# Patient Record
Sex: FEMALE | Race: WHITE | Employment: UNEMPLOYED | URBAN - METROPOLITAN AREA
[De-identification: names, ages, dates, MRNs, and addresses within clinical notes are randomized per-mention and may not be internally consistent; named-entity substitution may affect disease eponyms.]

---

## 2017-05-14 ENCOUNTER — APPOINTMENT (OUTPATIENT)
Dept: GENERAL RADIOLOGY | Age: 57
End: 2017-05-14
Attending: EMERGENCY MEDICINE
Payer: MEDICAID

## 2017-05-14 ENCOUNTER — HOSPITAL ENCOUNTER (EMERGENCY)
Age: 57
Discharge: HOME | End: 2017-05-15
Attending: EMERGENCY MEDICINE
Payer: MEDICAID

## 2017-05-14 VITALS
OXYGEN SATURATION: 93 % | HEIGHT: 65 IN | WEIGHT: 220.02 LBS | TEMPERATURE: 99.2 F | SYSTOLIC BLOOD PRESSURE: 148 MMHG | HEART RATE: 91 BPM | BODY MASS INDEX: 36.66 KG/M2 | RESPIRATION RATE: 26 BRPM | DIASTOLIC BLOOD PRESSURE: 82 MMHG

## 2017-05-14 DIAGNOSIS — R09.89 PULMONARY VASCULAR CONGESTION: ICD-10-CM

## 2017-05-14 DIAGNOSIS — N39.0 URINARY TRACT INFECTION WITHOUT HEMATURIA, SITE UNSPECIFIED: Primary | ICD-10-CM

## 2017-05-14 LAB
ALBUMIN SERPL BCP-MCNC: 3.3 G/DL (ref 3.5–5)
ALBUMIN/GLOB SERPL: 0.8 {RATIO} (ref 1.1–2.2)
ALP SERPL-CCNC: 88 U/L (ref 45–117)
ALT SERPL-CCNC: 36 U/L (ref 12–78)
ANION GAP BLD CALC-SCNC: 9 MMOL/L (ref 5–15)
APPEARANCE UR: ABNORMAL
APTT PPP: 30.2 SEC (ref 22.1–32.5)
AST SERPL W P-5'-P-CCNC: 21 U/L (ref 15–37)
BACTERIA URNS QL MICRO: ABNORMAL /HPF
BASOPHILS # BLD AUTO: 0 K/UL (ref 0–0.1)
BASOPHILS # BLD: 0 % (ref 0–1)
BILIRUB SERPL-MCNC: 0.4 MG/DL (ref 0.2–1)
BILIRUB UR QL: NEGATIVE
BNP SERPL-MCNC: 89 PG/ML (ref 0–125)
BUN SERPL-MCNC: 12 MG/DL (ref 6–20)
BUN/CREAT SERPL: 14 (ref 12–20)
CALCIUM SERPL-MCNC: 8.8 MG/DL (ref 8.5–10.1)
CHLORIDE SERPL-SCNC: 105 MMOL/L (ref 97–108)
CK SERPL-CCNC: 58 U/L (ref 26–192)
CO2 SERPL-SCNC: 25 MMOL/L (ref 21–32)
COLOR UR: ABNORMAL
CREAT SERPL-MCNC: 0.83 MG/DL (ref 0.55–1.02)
EOSINOPHIL # BLD: 0.1 K/UL (ref 0–0.4)
EOSINOPHIL NFR BLD: 2 % (ref 0–7)
EPITH CASTS URNS QL MICRO: ABNORMAL /LPF
ERYTHROCYTE [DISTWIDTH] IN BLOOD BY AUTOMATED COUNT: 13.8 % (ref 11.5–14.5)
GLOBULIN SER CALC-MCNC: 4.3 G/DL (ref 2–4)
GLUCOSE SERPL-MCNC: 210 MG/DL (ref 65–100)
GLUCOSE UR STRIP.AUTO-MCNC: NEGATIVE MG/DL
HCT VFR BLD AUTO: 46.1 % (ref 35–47)
HGB BLD-MCNC: 15.5 G/DL (ref 11.5–16)
HGB UR QL STRIP: ABNORMAL
INR PPP: 1 (ref 0.9–1.1)
KETONES UR QL STRIP.AUTO: NEGATIVE MG/DL
LEUKOCYTE ESTERASE UR QL STRIP.AUTO: ABNORMAL
LYMPHOCYTES # BLD AUTO: 34 % (ref 12–49)
LYMPHOCYTES # BLD: 2.5 K/UL (ref 0.8–3.5)
MCH RBC QN AUTO: 30 PG (ref 26–34)
MCHC RBC AUTO-ENTMCNC: 33.6 G/DL (ref 30–36.5)
MCV RBC AUTO: 89.2 FL (ref 80–99)
MONOCYTES # BLD: 0.5 K/UL (ref 0–1)
MONOCYTES NFR BLD AUTO: 7 % (ref 5–13)
NEUTS SEG # BLD: 4.2 K/UL (ref 1.8–8)
NEUTS SEG NFR BLD AUTO: 57 % (ref 32–75)
NITRITE UR QL STRIP.AUTO: POSITIVE
PH UR STRIP: 5 [PH] (ref 5–8)
PLATELET # BLD AUTO: 226 K/UL (ref 150–400)
POTASSIUM SERPL-SCNC: 4 MMOL/L (ref 3.5–5.1)
PROT SERPL-MCNC: 7.6 G/DL (ref 6.4–8.2)
PROT UR STRIP-MCNC: NEGATIVE MG/DL
PROTHROMBIN TIME: 10.3 SEC (ref 9–11.1)
RBC # BLD AUTO: 5.17 M/UL (ref 3.8–5.2)
RBC #/AREA URNS HPF: ABNORMAL /HPF (ref 0–5)
SODIUM SERPL-SCNC: 139 MMOL/L (ref 136–145)
SP GR UR REFRACTOMETRY: 1.02 (ref 1–1.03)
THERAPEUTIC RANGE,PTTT: NORMAL SECS (ref 58–77)
TROPONIN I SERPL-MCNC: <0.04 NG/ML
UA: UC IF INDICATED,UAUC: ABNORMAL
UROBILINOGEN UR QL STRIP.AUTO: 0.2 EU/DL (ref 0.2–1)
WBC # BLD AUTO: 7.3 K/UL (ref 3.6–11)
WBC URNS QL MICRO: ABNORMAL /HPF (ref 0–4)

## 2017-05-14 PROCEDURE — 99284 EMERGENCY DEPT VISIT MOD MDM: CPT

## 2017-05-14 PROCEDURE — 84484 ASSAY OF TROPONIN QUANT: CPT | Performed by: EMERGENCY MEDICINE

## 2017-05-14 PROCEDURE — 93005 ELECTROCARDIOGRAM TRACING: CPT

## 2017-05-14 PROCEDURE — 87077 CULTURE AEROBIC IDENTIFY: CPT | Performed by: EMERGENCY MEDICINE

## 2017-05-14 PROCEDURE — 74011250636 HC RX REV CODE- 250/636

## 2017-05-14 PROCEDURE — 74011000258 HC RX REV CODE- 258: Performed by: EMERGENCY MEDICINE

## 2017-05-14 PROCEDURE — 87186 SC STD MICRODIL/AGAR DIL: CPT | Performed by: EMERGENCY MEDICINE

## 2017-05-14 PROCEDURE — 85610 PROTHROMBIN TIME: CPT | Performed by: EMERGENCY MEDICINE

## 2017-05-14 PROCEDURE — 96375 TX/PRO/DX INJ NEW DRUG ADDON: CPT

## 2017-05-14 PROCEDURE — 71010 XR CHEST PORT: CPT

## 2017-05-14 PROCEDURE — 83880 ASSAY OF NATRIURETIC PEPTIDE: CPT | Performed by: EMERGENCY MEDICINE

## 2017-05-14 PROCEDURE — 36415 COLL VENOUS BLD VENIPUNCTURE: CPT | Performed by: EMERGENCY MEDICINE

## 2017-05-14 PROCEDURE — 87086 URINE CULTURE/COLONY COUNT: CPT | Performed by: EMERGENCY MEDICINE

## 2017-05-14 PROCEDURE — 85025 COMPLETE CBC W/AUTO DIFF WBC: CPT | Performed by: EMERGENCY MEDICINE

## 2017-05-14 PROCEDURE — 82550 ASSAY OF CK (CPK): CPT | Performed by: EMERGENCY MEDICINE

## 2017-05-14 PROCEDURE — 85730 THROMBOPLASTIN TIME PARTIAL: CPT | Performed by: EMERGENCY MEDICINE

## 2017-05-14 PROCEDURE — 74011250636 HC RX REV CODE- 250/636: Performed by: EMERGENCY MEDICINE

## 2017-05-14 PROCEDURE — 80053 COMPREHEN METABOLIC PANEL: CPT | Performed by: EMERGENCY MEDICINE

## 2017-05-14 PROCEDURE — 96365 THER/PROPH/DIAG IV INF INIT: CPT

## 2017-05-14 PROCEDURE — 81001 URINALYSIS AUTO W/SCOPE: CPT | Performed by: EMERGENCY MEDICINE

## 2017-05-14 PROCEDURE — 74011250637 HC RX REV CODE- 250/637: Performed by: EMERGENCY MEDICINE

## 2017-05-14 RX ORDER — ONDANSETRON 4 MG/1
4 TABLET, ORALLY DISINTEGRATING ORAL
Qty: 10 TAB | Refills: 0 | Status: SHIPPED | OUTPATIENT
Start: 2017-05-14 | End: 2018-05-15

## 2017-05-14 RX ORDER — MORPHINE SULFATE 4 MG/ML
INJECTION, SOLUTION INTRAMUSCULAR; INTRAVENOUS
Status: COMPLETED
Start: 2017-05-14 | End: 2017-05-14

## 2017-05-14 RX ORDER — ONDANSETRON 2 MG/ML
4 INJECTION INTRAMUSCULAR; INTRAVENOUS
Status: COMPLETED | OUTPATIENT
Start: 2017-05-14 | End: 2017-05-14

## 2017-05-14 RX ORDER — CEPHALEXIN 500 MG/1
500 CAPSULE ORAL 4 TIMES DAILY
Qty: 28 CAP | Refills: 0 | Status: SHIPPED | OUTPATIENT
Start: 2017-05-14 | End: 2017-05-21

## 2017-05-14 RX ORDER — MORPHINE SULFATE 4 MG/ML
4 INJECTION, SOLUTION INTRAMUSCULAR; INTRAVENOUS
Status: COMPLETED | OUTPATIENT
Start: 2017-05-14 | End: 2017-05-14

## 2017-05-14 RX ORDER — KETOROLAC TROMETHAMINE 30 MG/ML
30 INJECTION, SOLUTION INTRAMUSCULAR; INTRAVENOUS
Status: COMPLETED | OUTPATIENT
Start: 2017-05-14 | End: 2017-05-14

## 2017-05-14 RX ORDER — PHENAZOPYRIDINE HYDROCHLORIDE 100 MG/1
200 TABLET, FILM COATED ORAL
Status: COMPLETED | OUTPATIENT
Start: 2017-05-14 | End: 2017-05-14

## 2017-05-14 RX ADMIN — CEFTRIAXONE 1 G: 1 INJECTION, POWDER, FOR SOLUTION INTRAMUSCULAR; INTRAVENOUS at 22:55

## 2017-05-14 RX ADMIN — MORPHINE SULFATE 4 MG: 4 INJECTION, SOLUTION INTRAMUSCULAR; INTRAVENOUS at 23:39

## 2017-05-14 RX ADMIN — PHENAZOPYRIDINE HYDROCHLORIDE 200 MG: 100 TABLET ORAL at 23:02

## 2017-05-14 RX ADMIN — SODIUM CHLORIDE 1000 ML: 900 INJECTION, SOLUTION INTRAVENOUS at 22:55

## 2017-05-14 RX ADMIN — KETOROLAC TROMETHAMINE 30 MG: 30 INJECTION, SOLUTION INTRAMUSCULAR at 23:02

## 2017-05-14 RX ADMIN — ONDANSETRON HYDROCHLORIDE 4 MG: 2 INJECTION, SOLUTION INTRAMUSCULAR; INTRAVENOUS at 22:55

## 2017-05-14 NOTE — LETTER
[address] 
[facility] EMERGENCY DEPT 
[address] [place] [address] [id number] [phone] Work/School Note Date: 5/14/2017 To Whom It May concern: Tony Osei was seen and treated today in the emergency room by the following provider(s): 
Attending Provider: Elly Balderrama MD.   
 
Tony Osei may return to work on 5/16/17.  
 
Sincerely, 
 
 
 
 
Elly Balderrama MD

## 2017-05-15 NOTE — ED PROVIDER NOTES
HPI Comments: Roberto Carlos Edwards is a 65 y.o. female with no significant PMHx who presents ambulatory to ED [facility] ED with cc of acute onset CP while in ED [facility] triage, acute onset abdominal pain that started on the sides and radiated to the mid abdomen with associated nausea, acute onset back pain after eating cereal and lying down in bed, cough x yesterday, acute onset bilateral knee pain x earlier this week. Pt describes her acute CP as very painful \"deep achy pain\" during onset but states her CP \"leveled out and is bearable\" while in the room. Pt did have a stress test done with a Cardiologist from [address] [place] [address] in the past which was normal.  Pt describes her abdominal pain as \"crampy\" and states that, \"it feels like labor pain\". Pt's last BM was about 4 hours PTA which she notes was \"soft\" but denies any diarrhea. Pt denies any recent long car or plane travels. Pt specifically denies any SOB, Leg swelling, dysuria, hematuria, urinary frequency, diarrhea, Numbness or tingling in the lower extremities, urinary or fecal incontinence, saddle anesthesia. PCP: Citlali Mcmahon MD    Social Hx: + tobacco (+), -EtOH (-), - illicit drugs (-). There are no other complaints, changes or physical findings at this time. The history is provided by the patient. No  was used. No past medical history on file. No past surgical history on file. No family history on file. Social History     Social History    Marital status:      Spouse name: N/A    Number of children: N/A    Years of education: N/A     Occupational History    Not on file. Social History Main Topics    Smoking status: Not on file    Smokeless tobacco: Not on file    Alcohol use Not on file    Drug use: Not on file    Sexual activity: Not on file     Other Topics Concern    Not on file     Social History Narrative         ALLERGIES: Review of patient's allergies indicates no known allergies.     Review of Systems Constitutional: Negative. Negative for fever. Eyes: Negative. Respiratory: Positive for cough. Negative for shortness of breath. Cardiovascular: Positive for chest pain. Negative for leg swelling. Gastrointestinal: Positive for abdominal pain and nausea. Negative for diarrhea and vomiting. Endocrine: Negative. Genitourinary: Negative. Negative for difficulty urinating, dysuria, frequency and hematuria. Musculoskeletal: Positive for arthralgias (bilateral knees) and back pain. Skin: Negative. Allergic/Immunologic: Negative. Neurological: Negative. Negative for weakness and numbness. (-) saddle anesthesia  (-) urinary or fecal incontinence   Psychiatric/Behavioral: Negative for suicidal ideas. All other systems reviewed and are negative. Vitals:    05/14/17 2042   BP: (!) 156/94   Pulse: 100   Resp: 18   Temp: 99.2 °F (37.3 °C)   SpO2: 98%   Weight: 99.8 kg (220 lb 0.3 oz)   Height: 5' 5\" (1.651 m)            Physical Exam   Constitutional: She is oriented to person, place, and time. She appears well-developed and well-nourished. No distress. HENT:   Head: Normocephalic and atraumatic. Nose: Nose normal.   Eyes: Conjunctivae and EOM are normal. No scleral icterus. Neck: Normal range of motion. No tracheal deviation present. Cardiovascular: Normal rate, regular rhythm, normal heart sounds and intact distal pulses. Exam reveals no friction rub. No murmur heard. Pulmonary/Chest: Effort normal and breath sounds normal. No stridor. No respiratory distress. She has no wheezes. She has no rales. Abdominal: Soft. Bowel sounds are normal. She exhibits no distension. There is tenderness in the suprapubic area. There is no rigidity, no rebound and no guarding. Musculoskeletal: Normal range of motion. She exhibits no tenderness. Neurological: She is alert and oriented to person, place, and time. No cranial nerve deficit. Skin: Skin is warm and dry.  No rash noted. She is not diaphoretic. Psychiatric: She has a normal mood and affect. Her speech is normal and behavior is normal. Judgment and thought content normal. Cognition and memory are normal.   Nursing note and vitals reviewed. MDM  Number of Diagnoses or Management Options  Pulmonary vascular congestion:   Urinary tract infection without hematuria, site unspecified:   Diagnosis management comments: DDX:  Pna, uti, uri, chf, acs    Plan:  Ekg, labs, cxr, ua, ivf, analgesic    Impression:  uti         Amount and/or Complexity of Data Reviewed  Clinical lab tests: ordered and reviewed  Tests in the radiology section of CPT®: reviewed and ordered  Tests in the medicine section of CPT®: ordered and reviewed  Review and summarize past medical records: yes  Independent visualization of images, tracings, or specimens: yes      ED Course       Procedures    I reviewed our electronic medical record system for any past medical records that were available that may contribute to the patients current condition, the nursing notes and and vital signs from today's visit    Nursing notes will be reviewed as they become available in realtime while the pt has been in the ED. Tara Morton MD    I spent 3-7 minutes discussing the medical risks of prolonged smoking habits and advised the patient of the benefits of the cessation of smoking, providing specific suggestions on how to quit. Tara Morton MD      2042  Pt's rate is 100 with a sins rhythm as noted on Cardiac monitor. SpO2 is 98, on (RA)    EKG interpretation 2043: sinus tach, nl Axis, rate 103; , QRS 82, QTc 440; no acute ischemia; Tara Morton MD    I personally reviewed pt's imaging. Official read by radiology listed below. Tara Morton MD      Progress note:  Pt noted to be feeling better after toradol and IVF, ready for discharge. Discussed lab and imaging findings with pt and/or family. Will follow up as instructed.  All questions have been answered, pt voiced understanding and agreement with plan. If narcotics were prescribed, pt was advised not to drive or operate heavy machinery. If abx were prescribed, pt advised that diarrhea and rash are possible side effects of the medications. Specific return precautions provided as well as instructions to return to the ED should sx worsen at any time. Rima Hill MD       LABORATORY TESTS:  Recent Results (from the past 12 hour(s))   EKG, 12 LEAD, INITIAL    Collection Time: 05/14/17  8:43 PM   Result Value Ref Range    Ventricular Rate 103 BPM    Atrial Rate 103 BPM    P-R Interval 134 ms    QRS Duration 82 ms    Q-T Interval 336 ms    QTC Calculation (Bezet) 440 ms    Calculated P Axis 58 degrees    Calculated R Axis -4 degrees    Calculated T Axis 49 degrees    Diagnosis       Sinus tachycardia  Possible Inferior infarct , age undetermined  Possible Anterior infarct , age undetermined  No previous ECGs available     CBC WITH AUTOMATED DIFF    Collection Time: 05/14/17  8:55 PM   Result Value Ref Range    WBC 7.3 3.6 - 11.0 K/uL    RBC 5.17 3.80 - 5.20 M/uL    HGB 15.5 11.5 - 16.0 g/dL    HCT 46.1 35.0 - 47.0 %    MCV 89.2 80.0 - 99.0 FL    MCH 30.0 26.0 - 34.0 PG    MCHC 33.6 30.0 - 36.5 g/dL    RDW 13.8 11.5 - 14.5 %    PLATELET 136 812 - 118 K/uL    NEUTROPHILS 57 32 - 75 %    LYMPHOCYTES 34 12 - 49 %    MONOCYTES 7 5 - 13 %    EOSINOPHILS 2 0 - 7 %    BASOPHILS 0 0 - 1 %    ABS. NEUTROPHILS 4.2 1.8 - 8.0 K/UL    ABS. LYMPHOCYTES 2.5 0.8 - 3.5 K/UL    ABS. MONOCYTES 0.5 0.0 - 1.0 K/UL    ABS. EOSINOPHILS 0.1 0.0 - 0.4 K/UL    ABS.  BASOPHILS 0.0 0.0 - 0.1 K/UL   METABOLIC PANEL, COMPREHENSIVE    Collection Time: 05/14/17  8:55 PM   Result Value Ref Range    Sodium 139 136 - 145 mmol/L    Potassium 4.0 3.5 - 5.1 mmol/L    Chloride 105 97 - 108 mmol/L    CO2 25 21 - 32 mmol/L    Anion gap 9 5 - 15 mmol/L    Glucose 210 (H) 65 - 100 mg/dL    BUN 12 6 - 20 MG/DL    Creatinine 0.83 0.55 - 1.02 MG/DL BUN/Creatinine ratio 14 12 - 20      GFR est AA >60 >60 ml/min/1.73m2    GFR est non-AA >60 >60 ml/min/1.73m2    Calcium 8.8 8.5 - 10.1 MG/DL    Bilirubin, total 0.4 0.2 - 1.0 MG/DL    ALT (SGPT) 36 12 - 78 U/L    AST (SGOT) 21 15 - 37 U/L    Alk. phosphatase 88 45 - 117 U/L    Protein, total 7.6 6.4 - 8.2 g/dL    Albumin 3.3 (L) 3.5 - 5.0 g/dL    Globulin 4.3 (H) 2.0 - 4.0 g/dL    A-G Ratio 0.8 (L) 1.1 - 2.2     CK W/ REFLX CKMB    Collection Time: 05/14/17  8:55 PM   Result Value Ref Range    CK 58 26 - 192 U/L   TROPONIN I    Collection Time: 05/14/17  8:55 PM   Result Value Ref Range    Troponin-I, Qt. <0.04 <0.05 ng/mL   PRO-BNP    Collection Time: 05/14/17  8:55 PM   Result Value Ref Range    NT pro-BNP 89 0 - 125 PG/ML   PROTHROMBIN TIME + INR    Collection Time: 05/14/17  8:55 PM   Result Value Ref Range    INR 1.0 0.9 - 1.1      Prothrombin time 10.3 9.0 - 11.1 sec   PTT    Collection Time: 05/14/17  8:55 PM   Result Value Ref Range    aPTT 30.2 22.1 - 32.5 sec    aPTT, therapeutic range     58.0 - 77.0 SECS   URINALYSIS W/ REFLEX CULTURE    Collection Time: 05/14/17 10:01 PM   Result Value Ref Range    Color YELLOW/STRAW      Appearance CLOUDY (A) CLEAR      Specific gravity 1.024 1.003 - 1.030      pH (UA) 5.0 5.0 - 8.0      Protein NEGATIVE  NEG mg/dL    Glucose NEGATIVE  NEG mg/dL    Ketone NEGATIVE  NEG mg/dL    Bilirubin NEGATIVE  NEG      Blood SMALL (A) NEG      Urobilinogen 0.2 0.2 - 1.0 EU/dL    Nitrites POSITIVE (A) NEG      Leukocyte Esterase LARGE (A) NEG      WBC PENDING /hpf    RBC PENDING /hpf    Epithelial cells PENDING /lpf    Bacteria PENDING /hpf    UA:UC IF INDICATED PENDING        IMAGING RESULTS:  XR CHEST PORT   Final Result   Study Result      INDICATION: chest pain      COMPARISON: 4/10/2006     FINDINGS: Single AP portable view of the chest obtained at 2053 demonstrates a  stable cardiomediastinal silhouette. There is pulmonary vascular congestion  without overt edema.  There is no focal airspace disease. No osseous  abnormalities are seen.     IMPRESSION  IMPRESSION: Pulmonary vascular congestion            MEDICATIONS GIVEN:  Medications   sodium chloride 0.9 % bolus infusion 1,000 mL (not administered)   ondansetron (ZOFRAN) injection 4 mg (not administered)   cefTRIAXone (ROCEPHIN) 1 g in 0.9% sodium chloride (MBP/ADV) 50 mL MBP (not administered)       IMPRESSION:  1. Urinary tract infection without hematuria, site unspecified    2. Pulmonary vascular congestion        PLAN:  1. Current Discharge Medication List      START taking these medications    Details   cephALEXin (KEFLEX) 500 mg capsule Take 1 Cap by mouth four (4) times daily for 7 days. Qty: 28 Cap, Refills: 0      ondansetron (ZOFRAN ODT) 4 mg disintegrating tablet Take 1 Tab by mouth every eight (8) hours as needed for Nausea. Qty: 10 Tab, Refills: 0           2. Follow-up Information     Follow up With Details Comments Manjit Saxena MD Schedule an appointment as soon as possible for a visit in 2 days  [address] [place] [address]   [address] [place] [address]  [phone]          Return to ED if worse     DISCHARGE NOTE  10:48 PM  The patient has been re-evaluated and is ready for discharge. Reviewed available results with patient. Counseled pt on diagnosis and care plan. Pt has expressed understanding, and all questions have been answered. Pt agrees with plan and agrees to F/U as recommended, or return to the ED if their sxs worsen. Discharge instructions have been provided and explained to the pt, along with reasons to return to the ED. This note is prepared by Mavis Mtz acting as Scribe for MD Pattie Velazquez MD : The scribe's documentation has been prepared under my direction and personally reviewed by me in its entirety. I confirm that the note above accurately reflects all work, treatment, procedures, and medical decision making performed by me.                   This note will not be viewable in [address]

## 2017-05-15 NOTE — DISCHARGE INSTRUCTIONS
Urinary Tract Infection in Women: Care Instructions  Your Care Instructions    A urinary tract infection, or UTI, is a general term for an infection anywhere between the kidneys and the urethra (where urine comes out). Most UTIs are bladder infections. They often cause pain or burning when you urinate. UTIs are caused by bacteria and can be cured with antibiotics. Be sure to complete your treatment so that the infection goes away. Follow-up care is a key part of your treatment and safety. Be sure to make and go to all appointments, and call your doctor if you are having problems. It's also a good idea to know your test results and keep a list of the medicines you take. How can you care for yourself at home? · Take your antibiotics as directed. Do not stop taking them just because you feel better. You need to take the full course of antibiotics. · Drink extra water and other fluids for the next day or two. This may help wash out the bacteria that are causing the infection. (If you have kidney, heart, or liver disease and have to limit fluids, talk with your doctor before you increase your fluid intake.)  · Avoid drinks that are carbonated or have caffeine. They can irritate the bladder. · Urinate often. Try to empty your bladder each time. · To relieve pain, take a hot bath or lay a heating pad set on low over your lower belly or genital area. Never go to sleep with a heating pad in place. To prevent UTIs  · Drink plenty of water each day. This helps you urinate often, which clears bacteria from your system. (If you have kidney, heart, or liver disease and have to limit fluids, talk with your doctor before you increase your fluid intake.)  · Urinate when you need to. · Urinate right after you have sex. · Change sanitary pads often. · Avoid douches, bubble baths, feminine hygiene sprays, and other feminine hygiene products that have deodorants.   · After going to the bathroom, wipe from front to back.  When should you call for help? Call your doctor now or seek immediate medical care if:  · Symptoms such as fever, chills, nausea, or vomiting get worse or appear for the first time. · You have new pain in your back just below your rib cage. This is called flank pain. · There is new blood or pus in your urine. · You have any problems with your antibiotic medicine. Watch closely for changes in your health, and be sure to contact your doctor if:  · You are not getting better after taking an antibiotic for 2 days. · Your symptoms go away but then come back. Where can you learn more? Go to http://mayank-philip.info/. Enter J772 in the search box to learn more about \"Urinary Tract Infection in Women: Care Instructions. \"  Current as of: November 28, 2016  Content Version: 11.2  © 3777-6628 [employer]. Care instructions adapted under license by [employer] (which disclaims liability or warranty for this information). If you have questions about a medical condition or this instruction, always ask your healthcare professional. [address] any warranty or liability for your use of this information.

## 2017-05-16 LAB
ATRIAL RATE: 103 BPM
CALCULATED P AXIS, ECG09: 58 DEGREES
CALCULATED R AXIS, ECG10: -4 DEGREES
CALCULATED T AXIS, ECG11: 49 DEGREES
DIAGNOSIS, 93000: NORMAL
P-R INTERVAL, ECG05: 134 MS
Q-T INTERVAL, ECG07: 336 MS
QRS DURATION, ECG06: 82 MS
QTC CALCULATION (BEZET), ECG08: 440 MS
VENTRICULAR RATE, ECG03: 103 BPM

## 2017-05-17 LAB
BACTERIA SPEC CULT: ABNORMAL
CC UR VC: ABNORMAL
SERVICE CMNT-IMP: ABNORMAL

## 2018-05-15 ENCOUNTER — HOSPITAL ENCOUNTER (EMERGENCY)
Age: 58
Discharge: HOME | End: 2018-05-15
Attending: EMERGENCY MEDICINE
Payer: MEDICAID

## 2018-05-15 ENCOUNTER — APPOINTMENT (OUTPATIENT)
Dept: CT IMAGING | Age: 58
End: 2018-05-15
Attending: EMERGENCY MEDICINE
Payer: MEDICAID

## 2018-05-15 VITALS
HEIGHT: 66 IN | DIASTOLIC BLOOD PRESSURE: 70 MMHG | SYSTOLIC BLOOD PRESSURE: 140 MMHG | WEIGHT: 221.34 LBS | TEMPERATURE: 98.1 F | OXYGEN SATURATION: 98 % | HEART RATE: 67 BPM | BODY MASS INDEX: 35.57 KG/M2 | RESPIRATION RATE: 16 BRPM

## 2018-05-15 DIAGNOSIS — R10.84 ABDOMINAL PAIN, GENERALIZED: Primary | ICD-10-CM

## 2018-05-15 DIAGNOSIS — K80.20 CALCULUS OF GALLBLADDER WITHOUT CHOLECYSTITIS WITHOUT OBSTRUCTION: ICD-10-CM

## 2018-05-15 DIAGNOSIS — R10.9 ACUTE RIGHT FLANK PAIN: ICD-10-CM

## 2018-05-15 DIAGNOSIS — R73.9 HYPERGLYCEMIA: ICD-10-CM

## 2018-05-15 DIAGNOSIS — K76.9 LESION OF LIVER: ICD-10-CM

## 2018-05-15 LAB
ALBUMIN SERPL-MCNC: 3.5 G/DL (ref 3.5–5)
ALBUMIN/GLOB SERPL: 0.8 {RATIO} (ref 1.1–2.2)
ALP SERPL-CCNC: 97 U/L (ref 45–117)
ALT SERPL-CCNC: 44 U/L (ref 12–78)
ANION GAP SERPL CALC-SCNC: 5 MMOL/L (ref 5–15)
APPEARANCE UR: CLEAR
AST SERPL-CCNC: 22 U/L (ref 15–37)
BACTERIA URNS QL MICRO: NEGATIVE /HPF
BASOPHILS # BLD: 0 K/UL (ref 0–0.1)
BASOPHILS NFR BLD: 1 % (ref 0–1)
BILIRUB SERPL-MCNC: 0.5 MG/DL (ref 0.2–1)
BILIRUB UR QL: NEGATIVE
BUN SERPL-MCNC: 9 MG/DL (ref 6–20)
BUN/CREAT SERPL: 13 (ref 12–20)
CALCIUM SERPL-MCNC: 9 MG/DL (ref 8.5–10.1)
CHLORIDE SERPL-SCNC: 105 MMOL/L (ref 97–108)
CO2 SERPL-SCNC: 24 MMOL/L (ref 21–32)
COLOR UR: ABNORMAL
CREAT SERPL-MCNC: 0.67 MG/DL (ref 0.55–1.02)
DIFFERENTIAL METHOD BLD: ABNORMAL
EOSINOPHIL # BLD: 0.2 K/UL (ref 0–0.4)
EOSINOPHIL NFR BLD: 2 % (ref 0–7)
EPITH CASTS URNS QL MICRO: ABNORMAL /LPF
ERYTHROCYTE [DISTWIDTH] IN BLOOD BY AUTOMATED COUNT: 13.6 % (ref 11.5–14.5)
GLOBULIN SER CALC-MCNC: 4.2 G/DL (ref 2–4)
GLUCOSE SERPL-MCNC: 311 MG/DL (ref 65–100)
GLUCOSE UR STRIP.AUTO-MCNC: >1000 MG/DL
HCG SERPL QL: NEGATIVE
HCT VFR BLD AUTO: 45.9 % (ref 35–47)
HGB BLD-MCNC: 15.8 G/DL (ref 11.5–16)
HGB UR QL STRIP: ABNORMAL
HYALINE CASTS URNS QL MICRO: ABNORMAL /LPF (ref 0–5)
IMM GRANULOCYTES # BLD: 0 K/UL (ref 0–0.04)
IMM GRANULOCYTES NFR BLD AUTO: 0 % (ref 0–0.5)
KETONES UR QL STRIP.AUTO: NEGATIVE MG/DL
LEUKOCYTE ESTERASE UR QL STRIP.AUTO: NEGATIVE
LIPASE SERPL-CCNC: 111 U/L (ref 73–393)
LYMPHOCYTES # BLD: 2.6 K/UL (ref 0.8–3.5)
LYMPHOCYTES NFR BLD: 39 % (ref 12–49)
MCH RBC QN AUTO: 30 PG (ref 26–34)
MCHC RBC AUTO-ENTMCNC: 34.4 G/DL (ref 30–36.5)
MCV RBC AUTO: 87.3 FL (ref 80–99)
MONOCYTES # BLD: 0.3 K/UL (ref 0–1)
MONOCYTES NFR BLD: 5 % (ref 5–13)
NEUTS SEG # BLD: 3.5 K/UL (ref 1.8–8)
NEUTS SEG NFR BLD: 53 % (ref 32–75)
NITRITE UR QL STRIP.AUTO: NEGATIVE
NRBC # BLD: 0 K/UL (ref 0–0.01)
NRBC BLD-RTO: 0 PER 100 WBC
PH UR STRIP: 5 [PH] (ref 5–8)
PLATELET # BLD AUTO: 272 K/UL (ref 150–400)
PMV BLD AUTO: 12 FL (ref 8.9–12.9)
POTASSIUM SERPL-SCNC: 4.6 MMOL/L (ref 3.5–5.1)
PROT SERPL-MCNC: 7.7 G/DL (ref 6.4–8.2)
PROT UR STRIP-MCNC: NEGATIVE MG/DL
RBC # BLD AUTO: 5.26 M/UL (ref 3.8–5.2)
RBC #/AREA URNS HPF: ABNORMAL /HPF (ref 0–5)
SODIUM SERPL-SCNC: 134 MMOL/L (ref 136–145)
SP GR UR REFRACTOMETRY: 1.02 (ref 1–1.03)
UROBILINOGEN UR QL STRIP.AUTO: 0.2 EU/DL (ref 0.2–1)
WBC # BLD AUTO: 6.6 K/UL (ref 3.6–11)
WBC URNS QL MICRO: ABNORMAL /HPF (ref 0–4)

## 2018-05-15 PROCEDURE — 84703 CHORIONIC GONADOTROPIN ASSAY: CPT | Performed by: EMERGENCY MEDICINE

## 2018-05-15 PROCEDURE — 74177 CT ABD & PELVIS W/CONTRAST: CPT

## 2018-05-15 PROCEDURE — 96374 THER/PROPH/DIAG INJ IV PUSH: CPT

## 2018-05-15 PROCEDURE — 85025 COMPLETE CBC W/AUTO DIFF WBC: CPT | Performed by: PHYSICIAN ASSISTANT

## 2018-05-15 PROCEDURE — 96375 TX/PRO/DX INJ NEW DRUG ADDON: CPT

## 2018-05-15 PROCEDURE — 99284 EMERGENCY DEPT VISIT MOD MDM: CPT

## 2018-05-15 PROCEDURE — 36415 COLL VENOUS BLD VENIPUNCTURE: CPT | Performed by: PHYSICIAN ASSISTANT

## 2018-05-15 PROCEDURE — 96361 HYDRATE IV INFUSION ADD-ON: CPT

## 2018-05-15 PROCEDURE — 74011250636 HC RX REV CODE- 250/636: Performed by: EMERGENCY MEDICINE

## 2018-05-15 PROCEDURE — 96376 TX/PRO/DX INJ SAME DRUG ADON: CPT

## 2018-05-15 PROCEDURE — 80053 COMPREHEN METABOLIC PANEL: CPT | Performed by: PHYSICIAN ASSISTANT

## 2018-05-15 PROCEDURE — 81001 URINALYSIS AUTO W/SCOPE: CPT | Performed by: PHYSICIAN ASSISTANT

## 2018-05-15 PROCEDURE — 74011636320 HC RX REV CODE- 636/320: Performed by: EMERGENCY MEDICINE

## 2018-05-15 PROCEDURE — 83690 ASSAY OF LIPASE: CPT | Performed by: EMERGENCY MEDICINE

## 2018-05-15 RX ORDER — ONDANSETRON 4 MG/1
4 TABLET, ORALLY DISINTEGRATING ORAL
Qty: 10 TAB | Refills: 0 | Status: SHIPPED | OUTPATIENT
Start: 2018-05-15 | End: 2020-05-17

## 2018-05-15 RX ORDER — MORPHINE SULFATE 4 MG/ML
6 INJECTION INTRAVENOUS
Status: COMPLETED | OUTPATIENT
Start: 2018-05-15 | End: 2018-05-15

## 2018-05-15 RX ORDER — SODIUM CHLORIDE 0.9 % (FLUSH) 0.9 %
10 SYRINGE (ML) INJECTION
Status: COMPLETED | OUTPATIENT
Start: 2018-05-15 | End: 2018-05-15

## 2018-05-15 RX ORDER — HYDROCHLOROTHIAZIDE 25 MG/1
25 TABLET ORAL DAILY
COMMUNITY
End: 2020-05-17

## 2018-05-15 RX ORDER — ONDANSETRON 2 MG/ML
4 INJECTION INTRAMUSCULAR; INTRAVENOUS
Status: COMPLETED | OUTPATIENT
Start: 2018-05-15 | End: 2018-05-15

## 2018-05-15 RX ORDER — OXYCODONE HYDROCHLORIDE 5 MG/1
5 TABLET ORAL
Qty: 4 TAB | Refills: 0 | Status: SHIPPED | OUTPATIENT
Start: 2018-05-15 | End: 2018-05-24

## 2018-05-15 RX ORDER — DICYCLOMINE HYDROCHLORIDE 10 MG/1
10 CAPSULE ORAL 3 TIMES DAILY
Qty: 15 CAP | Refills: 0 | Status: SHIPPED | OUTPATIENT
Start: 2018-05-15 | End: 2018-05-24

## 2018-05-15 RX ORDER — SODIUM CHLORIDE 9 MG/ML
50 INJECTION, SOLUTION INTRAVENOUS
Status: COMPLETED | OUTPATIENT
Start: 2018-05-15 | End: 2018-05-15

## 2018-05-15 RX ORDER — LIDOCAINE 40 MG/G
CREAM TOPICAL
Qty: 15 G | Refills: 0 | Status: SHIPPED | OUTPATIENT
Start: 2018-05-15 | End: 2018-05-24

## 2018-05-15 RX ADMIN — SODIUM CHLORIDE 50 ML/HR: 900 INJECTION, SOLUTION INTRAVENOUS at 09:55

## 2018-05-15 RX ADMIN — SODIUM CHLORIDE 1000 ML: 900 INJECTION, SOLUTION INTRAVENOUS at 10:22

## 2018-05-15 RX ADMIN — ONDANSETRON 4 MG: 2 INJECTION INTRAMUSCULAR; INTRAVENOUS at 10:21

## 2018-05-15 RX ADMIN — Medication 10 ML: at 09:55

## 2018-05-15 RX ADMIN — MORPHINE SULFATE 6 MG: 4 INJECTION INTRAVENOUS at 10:21

## 2018-05-15 RX ADMIN — IOPAMIDOL 100 ML: 755 INJECTION, SOLUTION INTRAVENOUS at 09:55

## 2018-05-15 RX ADMIN — MORPHINE SULFATE 6 MG: 4 INJECTION INTRAVENOUS at 11:24

## 2018-05-15 NOTE — DISCHARGE INSTRUCTIONS
Abdominal Pain: Care Instructions  Your Care Instructions    Abdominal pain has many possible causes. Some aren't serious and get better on their own in a few days. Others need more testing and treatment. If your pain continues or gets worse, you need to be rechecked and may need more tests to find out what is wrong. You may need surgery to correct the problem. Don't ignore new symptoms, such as fever, nausea and vomiting, urination problems, pain that gets worse, and dizziness. These may be signs of a more serious problem. Your doctor may have recommended a follow-up visit in the next 8 to 12 hours. If you are not getting better, you may need more tests or treatment. The doctor has checked you carefully, but problems can develop later. If you notice any problems or new symptoms, get medical treatment right away. Follow-up care is a key part of your treatment and safety. Be sure to make and go to all appointments, and call your doctor if you are having problems. It's also a good idea to know your test results and keep a list of the medicines you take. How can you care for yourself at home? · Rest until you feel better. · To prevent dehydration, drink plenty of fluids, enough so that your urine is light yellow or clear like water. Choose water and other caffeine-free clear liquids until you feel better. If you have kidney, heart, or liver disease and have to limit fluids, talk with your doctor before you increase the amount of fluids you drink. · If your stomach is upset, eat mild foods, such as rice, dry toast or crackers, bananas, and applesauce. Try eating several small meals instead of two or three large ones. · Wait until 48 hours after all symptoms have gone away before you have spicy foods, alcohol, and drinks that contain caffeine. · Do not eat foods that are high in fat. · Avoid anti-inflammatory medicines such as aspirin, ibuprofen (Advil, Motrin), and naproxen (Aleve).  These can cause stomach upset. Talk to your doctor if you take daily aspirin for another health problem. When should you call for help? Call 911 anytime you think you may need emergency care. For example, call if:  ? · You passed out (lost consciousness). ? · You pass maroon or very bloody stools. ? · You vomit blood or what looks like coffee grounds. ? · You have new, severe belly pain. ?Call your doctor now or seek immediate medical care if:  ? · Your pain gets worse, especially if it becomes focused in one area of your belly. ? · You have a new or higher fever. ? · Your stools are black and look like tar, or they have streaks of blood. ? · You have unexpected vaginal bleeding. ? · You have symptoms of a urinary tract infection. These may include:  ¨ Pain when you urinate. ¨ Urinating more often than usual.  ¨ Blood in your urine. ? · You are dizzy or lightheaded, or you feel like you may faint. ? Watch closely for changes in your health, and be sure to contact your doctor if:  ? · You are not getting better after 1 day (24 hours). Where can you learn more? Go to http://mayank-philip.info/. Enter X658 in the search box to learn more about \"Abdominal Pain: Care Instructions. \"  Current as of: March 20, 2017  Content Version: 11.4  © 2815-4628 [employer]. Care instructions adapted under license by [employer] (which disclaims liability or warranty for this information). If you have questions about a medical condition or this instruction, always ask your healthcare professional. [place] [address] any warranty or liability for your use of this information. Learning About Gallstones  What are gallstones? Gallstones are stones that form in the gallbladder. The gallbladder is a small sac located just under the liver. It stores bile released by the liver. Bile helps you digest fats.  Gallstones can be smaller than a grain of sand or as large as a golf ball. Gallstones form when cholesterol and other things found in bile make stones. They can also form if the gallbladder doesn't empty as it should. Gallstones can also form in the common bile duct or cystic duct. These tubes carry bile from the gallbladder and the liver to the small intestine. What happens when you have gallstones? Gallstones can cause many different problems, such as:  · A blockage in the common bile duct. · Inflammation or infection of the gallbladder (acute cholecystitis). This can happen when a gallstone blocks the cystic duct. · Inflammation or infection of the common bile duct (cholangitis). This can happen when gallstones get stuck in the common bile duct. In rare cases, this can damage the liver or spread infection. · Pancreatitis, an inflammation of the pancreas. What are the symptoms? · Most people who have gallstones don't have symptoms. · Symptoms can include:  ¨ Mild pain in the pit of your stomach or in the upper right part of your belly. It may spread to your right upper back or shoulder blade area. ¨ Severe pain that may come and go or be steady. It may get worse when you eat. ¨ Fever and chills, if a gallstone is blocking a bile duct and causing an infection. ¨ Yellowing of your skin and the whites of your eyes. How can you prevent gallstones? There is no sure way to prevent gallstones. But you can reduce your risk of forming gallstones that can cause symptoms. · Stay at a healthy weight. If you need to lose weight, do so slowly and sensibly. · Eat regular, balanced meals. · Be active, and exercise regularly. How are gallstones treated? · If you don't have symptoms, you probably don't need treatment. · For mild symptoms, your doctor may have you take pain medicine and wait to see if the pain goes away. · For severe pain or infection, or if you have more than one gallstone attack, your doctor may suggest surgery to have your gallbladder removed.  The body works fine without a gallbladder. Follow-up care is a key part of your treatment and safety. Be sure to make and go to all appointments, and call your doctor if you are having problems. It's also a good idea to know your test results and keep a list of the medicines you take. Where can you learn more? Go to http://mayank-philip.info/. Enter  in the search box to learn more about \"Learning About Gallstones. \"  Current as of: May 12, 2017  Content Version: 11.4  © 6115-5815 [email or web address]. Care instructions adapted under license by [employer] (which disclaims liability or warranty for this information). If you have questions about a medical condition or this instruction, always ask your healthcare professional. [address] any warranty or liability for your use of this information. Learning About High Blood Sugar  What is high blood sugar? Your body turns the food you eat into glucose (sugar), which it uses for energy. But if your body isn't able to use the sugar right away, it can build up in your blood and lead to high blood sugar. When the amount of sugar in your blood stays too high for too much of the time, you may have diabetes. Diabetes is a disease that can cause serious health problems. The good news is that lifestyle changes may help you get your blood sugar back to normal and avoid or delay diabetes. What causes high blood sugar? Sugar (glucose) can build up in your blood if you:  · Are overweight. · Have a family history of diabetes. · Take certain medicines, such as steroids. What are the symptoms? Having high blood sugar may not cause any symptoms at all. Or it may make you feel very thirsty or very hungry. You may also urinate more often than usual, have blurry vision, or lose weight without trying. How is high blood sugar treated?   You can take steps to lower your blood sugar level if you understand what makes it get higher. Your doctor may want you to learn how to test your blood sugar level at home. Then you can see how illness, stress, or different kinds of food or medicine raise or lower your blood sugar level. Other tests may be needed to see if you have diabetes. How can you prevent high blood sugar? · Watch your weight. If you're overweight, losing just a small amount of weight may help. Reducing fat around your waist is most important. · Limit the amount of calories, sweets, and unhealthy fat you eat. Ask your doctor if a dietitian can help you. A registered dietitian can help you create meal plans that fit your lifestyle. · Get at least 30 minutes of exercise on most days of the week. Exercise helps control your blood sugar. It also helps you maintain a healthy weight. Walking is a good choice. You also may want to do other activities, such as running, swimming, cycling, or playing tennis or team sports. · If your doctor prescribed medicines, take them exactly as prescribed. Call your doctor if you think you are having a problem with your medicine. You will get more details on the specific medicines your doctor prescribes. Follow-up care is a key part of your treatment and safety. Be sure to make and go to all appointments, and call your doctor if you are having problems. It's also a good idea to know your test results and keep a list of the medicines you take. Where can you learn more? Go to http://mayank-philip.info/. Enter O108 in the search box to learn more about \"Learning About High Blood Sugar. \"  Current as of: March 13, 2017  Content Version: 11.4  © 7779-2869 [employer]. Care instructions adapted under license by [employer] (which disclaims liability or warranty for this information).  If you have questions about a medical condition or this instruction, always ask your healthcare professional. [place] [employer]rell disclaims any warranty or liability for your use of this information.

## 2018-05-15 NOTE — ED TRIAGE NOTES
Pt reports right side lower back pain beginning during the night, radiates around to lower abdomen, reports pain is similar to kidney stone she has had in past

## 2018-05-15 NOTE — ED PROVIDER NOTES
EMERGENCY DEPARTMENT HISTORY AND PHYSICAL EXAM      Date: 5/15/2018  Patient Name: Valerie Weldon    History of Presenting Illness     Chief Complaint   Patient presents with    Abdominal Pain     reports having abd cramping since last night and now with right sided back pain. + nausea. recently treated for vaginal infection and UTI     EXPRESS CARE NOTE:  9:11 AM  I have seen and evaluated this patient in the Express Care portion of triage for worsening R sided lower back pain and abdominal cramping since last night. The patients care will begin now and orders have been placed. This patient will be seen and provided further care in the Emergency Room. Written by Jim Alas, ED Scribe, as dictated by Leta Hernandez. History Provided By: Patient    HPI: Valerie Weldon, 63 y.o. female presents to the ED with cc of worsening 10/10 constant right lower back pain with associated constipation and diarrhea. Patient states she has been seen twice at Urgent Care over the past 1.5 months for abdominal cramping and bloating. This morning, she started having worsening right lower back pain with associated nausea and dizziness. She describes her back pain as a constant 10/10 ache. She says her current symptoms are similar to those she had associated with previous kidney stones requiring surgical removal. She took 3 Extra Strength Tylenol at home without relief. She denies any recent heavy lifting. She also denies any possibility of pregnancy. She reports a family history of kidney stones in her mother. She has been having some softer stools, but denies any diarrhea, melena, hematochezia, dysuria, vaginal bleeding, abnormal vaginal discharge, vomiting, or any other associated complaints. There are no other complaints, changes, or physical findings at this time.     PCP: None    Current Outpatient Prescriptions   Medication Sig Dispense Refill    hydroCHLOROthiazide (HYDRODIURIL) 25 mg tablet Take 25 mg by mouth daily.      ondansetron (ZOFRAN ODT) 4 mg disintegrating tablet Take 1 Tab by mouth every eight (8) hours as needed for Nausea. 10 Tab 0    dicyclomine (BENTYL) 10 mg capsule Take 1 Cap by mouth three (3) times daily for 5 days. As needed for abdominal cramping 15 Cap 0    oxyCODONE IR (ROXICODONE) 5 mg immediate release tablet Take 1 Tab by mouth every eight (8) hours as needed for Pain for up to 4 doses. Max Daily Amount: 15 mg. 4 Tab 0     Past History     Past Medical History:  History reviewed. No pertinent past medical history. Past Surgical History:  Past Surgical History:   Procedure Laterality Date    HX GYN         Family History:  History reviewed. No pertinent family history. Social History:  Social History   Substance Use Topics    Smoking status: Current Every Day Smoker    Smokeless tobacco: Never Used    Alcohol use No       Allergies:  No Known Allergies    Review of Systems   Review of Systems   Gastrointestinal: Positive for nausea. Negative for blood in stool, diarrhea and vomiting. Positive for soft stools   Genitourinary: Negative for dysuria, vaginal bleeding and vaginal discharge. She has been washing and scrubbing a lot, multiple times per day   Musculoskeletal: Positive for back pain (right lower). Neurological: Positive for dizziness. All other systems reviewed and are negative. Physical Exam   Physical Exam  Vital signs and nursing notes reviewed    CONSTITUTIONAL: Alert, in moderate distress; well-developed; well-nourished. HEAD:  Normocephalic, atraumatic  EYES: PERRL; EOM's intact. Non-icteric sclera. ENTM: Nose: no rhinorrhea; Throat: no erythema or exudate, mucous membranes moist  Neck:  Supple. trachea is midline. RESP: Chest clear, equal breath sounds, talking in full sentences, no respiratory distress. - W/R/R  CV: S1 and S2 WNL; No murmurs, gallops or rubs. 2+ radial and DP pulses bilaterally.   GI: non-distended, normal bowel sounds, abdomen soft. Tender to right lower greater than left lower quadrant, palpable tenderness to left flank, no overyling rash or vesicles. No masses or organomegaly. : No costo-vertebral angle tenderness. External female genitalia without discharge, rash, or vesicles; Labia majora bilaterally with extreme dryness  BACK:  Non-tender, normal appearance  UPPER EXT:  Normal inspection. no joint or soft tissue swelling  LOWER EXT: No peripheral edema, no calf tenderness. NEURO: Alert and oriented x3, 5/5 strength and light touch sensation intact in bilateral upper and lower extremities. SKIN: No rashes; Warm and dry. Nonjaundiced. PSYCH: Normal mood, normal affect     Diagnostic Study Results     Labs -     Recent Results (from the past 12 hour(s))   URINALYSIS W/ RFLX MICROSCOPIC    Collection Time: 05/15/18  9:00 AM   Result Value Ref Range    Color YELLOW/STRAW      Appearance CLEAR CLEAR      Specific gravity 1.025 1.003 - 1.030      pH (UA) 5.0 5.0 - 8.0      Protein NEGATIVE  NEG mg/dL    Glucose >1000 (A) NEG mg/dL    Ketone NEGATIVE  NEG mg/dL    Bilirubin NEGATIVE  NEG      Blood TRACE (A) NEG      Urobilinogen 0.2 0.2 - 1.0 EU/dL    Nitrites NEGATIVE  NEG      Leukocyte Esterase NEGATIVE  NEG      WBC 0-4 0 - 4 /hpf    RBC 0-5 0 - 5 /hpf    Epithelial cells FEW FEW /lpf    Bacteria NEGATIVE  NEG /hpf    Hyaline cast 0-2 0 - 5 /lpf   METABOLIC PANEL, COMPREHENSIVE    Collection Time: 05/15/18  9:18 AM   Result Value Ref Range    Sodium 134 (L) 136 - 145 mmol/L    Potassium 4.6 3.5 - 5.1 mmol/L    Chloride 105 97 - 108 mmol/L    CO2 24 21 - 32 mmol/L    Anion gap 5 5 - 15 mmol/L    Glucose 311 (H) 65 - 100 mg/dL    BUN 9 6 - 20 MG/DL    Creatinine 0.67 0.55 - 1.02 MG/DL    BUN/Creatinine ratio 13 12 - 20      GFR est AA >60 >60 ml/min/1.73m2    GFR est non-AA >60 >60 ml/min/1.73m2    Calcium 9.0 8.5 - 10.1 MG/DL    Bilirubin, total 0.5 0.2 - 1.0 MG/DL    ALT (SGPT) 44 12 - 78 U/L    AST (SGOT) 22 15 - 37 U/L    Alk. phosphatase 97 45 - 117 U/L    Protein, total 7.7 6.4 - 8.2 g/dL    Albumin 3.5 3.5 - 5.0 g/dL    Globulin 4.2 (H) 2.0 - 4.0 g/dL    A-G Ratio 0.8 (L) 1.1 - 2.2     CBC WITH AUTOMATED DIFF    Collection Time: 05/15/18  9:18 AM   Result Value Ref Range    WBC 6.6 3.6 - 11.0 K/uL    RBC 5.26 (H) 3.80 - 5.20 M/uL    HGB 15.8 11.5 - 16.0 g/dL    HCT 45.9 35.0 - 47.0 %    MCV 87.3 80.0 - 99.0 FL    MCH 30.0 26.0 - 34.0 PG    MCHC 34.4 30.0 - 36.5 g/dL    RDW 13.6 11.5 - 14.5 %    PLATELET 503 915 - 526 K/uL    MPV 12.0 8.9 - 12.9 FL    NRBC 0.0 0  WBC    ABSOLUTE NRBC 0.00 0.00 - 0.01 K/uL    NEUTROPHILS 53 32 - 75 %    LYMPHOCYTES 39 12 - 49 %    MONOCYTES 5 5 - 13 %    EOSINOPHILS 2 0 - 7 %    BASOPHILS 1 0 - 1 %    IMMATURE GRANULOCYTES 0 0.0 - 0.5 %    ABS. NEUTROPHILS 3.5 1.8 - 8.0 K/UL    ABS. LYMPHOCYTES 2.6 0.8 - 3.5 K/UL    ABS. MONOCYTES 0.3 0.0 - 1.0 K/UL    ABS. EOSINOPHILS 0.2 0.0 - 0.4 K/UL    ABS. BASOPHILS 0.0 0.0 - 0.1 K/UL    ABS. IMM. GRANS. 0.0 0.00 - 0.04 K/UL    DF AUTOMATED     HCG QL SERUM    Collection Time: 05/15/18  9:18 AM   Result Value Ref Range    HCG, Ql. NEGATIVE  NEG     LIPASE    Collection Time: 05/15/18  9:30 AM   Result Value Ref Range    Lipase 111 73 - 393 U/L       Radiologic Studies -   CT ABD PELV W CONT   Final Result        CT Results  (Last 48 hours)               05/15/18 1004  CT ABD PELV W CONT Final result    Impression:  IMPRESSION:   Hepatic steatosis. Indeterminant 18 mm hypodense lesion in the right hepatic   lobe. Further characterization with contrast-enhanced CT or with MR is   recommended. Cholelithiasis. Small bilateral renal cysts. No acute abnormality. Narrative:  EXAM:  CT ABD PELV W CONT       INDICATION: Pelvic pain and bloating, right flank pain since last night, nausea       COMPARISON: 3/4/2010       CONTRAST:  100 mL of Isovue-370.        TECHNIQUE:    Following the uneventful intravenous administration of contrast, thin axial   images were obtained through the abdomen and pelvis. Coronal and sagittal   reconstructions were generated. Oral contrast was not administered. CT dose   reduction was achieved through use of a standardized protocol tailored for this   examination and automatic exposure control for dose modulation. FINDINGS:    LUNG BASES: Clear. INCIDENTALLY IMAGED HEART AND MEDIASTINUM: Unremarkable. LIVER: Hepatic steatosis is noted. There is an 18 mm indeterminate hypodense   lesion in the right hepatic lobe. GALLBLADDER: Gallstone is noted. SPLEEN: No mass. PANCREAS: No mass or ductal dilatation. ADRENALS: There is a 9 mm right adrenal adenoma. KIDNEYS: Small bilateral renal cysts are noted, the largest of which measures 15   mm. STOMACH: There is a small hiatal hernia. SMALL BOWEL: No dilatation or wall thickening. COLON: No dilatation or wall thickening. APPENDIX: Unremarkable. PERITONEUM: No ascites or pneumoperitoneum. RETROPERITONEUM: No lymphadenopathy or aortic aneurysm. REPRODUCTIVE ORGANS: There is no pelvic mass or lymphadenopathy. URINARY BLADDER: No mass or calculus. BONES: Degenerative changes are seen in the lumbar spine and in the pubic   symphysis. ADDITIONAL COMMENTS: N/A               Medical Decision Making   I am the first provider for this patient. I reviewed the vital signs, available nursing notes, past medical history, past surgical history, family history and social history. Vital Signs-Reviewed the patient's vital signs.   Patient Vitals for the past 12 hrs:   Temp Pulse Resp BP SpO2   05/15/18 1145 - 67 - 151/72 99 %   05/15/18 1130 - - - 131/71 100 %   05/15/18 1115 - - - 140/61 97 %   05/15/18 1100 - - - 146/85 98 %   05/15/18 1045 - - - 135/64 100 %   05/15/18 1030 - - - 151/69 100 %   05/15/18 0835 98.1 °F (36.7 °C) 81 16 (!) 170/102 99 %       Pulse Oximetry Analysis - 99% on room air    Cardiac Monitor:   Rate: 81 bpm  Rhythm: Normal Sinus Rhythm     Records Reviewed: Nursing Notes and Old Medical Records    Provider Notes (Medical Decision Making):   64 yo F with right flank and abdominal pain found to have gallstone without evidence of cholecystitis or biliary obstruction. Improved after pain medication here. Also discussed liver lesion that needs follow up with her and her , though don't suspect it is related to today's pain. Unclear of patient's ongoing feeling of bloating in the pelvic area, no visualized concerns on CT and patient's vaginal area shows no evidence of HSV, candida, and likely is very inflamed due to patient's over washing at home. Plan for discharge.  plans to bring patient to his PMD to discuss gallstone, liver lesion, and hyperglycemia today which suggests new onset type 2 diabetes. ED Course:   Initial assessment performed. The patients presenting problems have been discussed, and they are in agreement with the care plan formulated and outlined with them. I have encouraged them to ask questions as they arise throughout their visit. Progress Note:  11:39 AM  Patient is still having pain. Additional pain medication ordered. Reviewed findings of gallstones and liver lesion with patient. Expressed importance of follow up for imaging of her liver lesion. Also expressed concern for hyperglycemia and concern for possible type 2 diabetes. Disposition:  Discharge Note:  1:19 PM  The pt is ready for discharge. The pt's signs, symptoms, diagnosis, and discharge instructions have been discussed and pt has conveyed their understanding. The pt is to follow up as recommended or return to ER should their symptoms worsen. Plan has been discussed and pt is in agreement. PLAN:  1. Current Discharge Medication List      START taking these medications    Details   ondansetron (ZOFRAN ODT) 4 mg disintegrating tablet Take 1 Tab by mouth every eight (8) hours as needed for Nausea.   Qty: 10 Tab, Refills: 0      dicyclomine (BENTYL) 10 mg capsule Take 1 Cap by mouth three (3) times daily for 5 days. As needed for abdominal cramping  Qty: 15 Cap, Refills: 0      oxyCODONE IR (ROXICODONE) 5 mg immediate release tablet Take 1 Tab by mouth every eight (8) hours as needed for Pain for up to 4 doses. Max Daily Amount: 15 mg.  Qty: 4 Tab, Refills: 0    Associated Diagnoses: Abdominal pain, generalized           2. Follow-up Information     Follow up With Details Comments Contact Info    None  Please see your 's PCP for follow-up about your gallstone, liver changes and elevated blood sugar. None [phone] Patient stated that they have no PCP      [facility] EMERGENCY DEPT  If symptoms worsen [address] [place] [address]  [phone]        Return to ED if worse     Diagnosis     Clinical Impression:   1. Abdominal pain, generalized    2. Calculus of gallbladder without cholecystitis without obstruction    3. Acute right flank pain    4. Hyperglycemia    5. Lesion of liver        Attestations: This note is prepared by Ilene Kidd, acting as Scribe for MD Arthur Verdugo MD: The scribe's documentation has been prepared under my direction and personally reviewed by me in its entirety. I confirm that the note above accurately reflects all work, treatment, procedures, and medical decision making performed by me.

## 2018-05-20 ENCOUNTER — APPOINTMENT (OUTPATIENT)
Dept: CT IMAGING | Age: 58
DRG: 463 | End: 2018-05-20
Attending: PHYSICIAN ASSISTANT
Payer: MEDICAID

## 2018-05-20 ENCOUNTER — HOSPITAL ENCOUNTER (INPATIENT)
Age: 58
LOS: 4 days | Discharge: HOME | DRG: 463 | End: 2018-05-24
Attending: EMERGENCY MEDICINE | Admitting: INTERNAL MEDICINE
Payer: MEDICAID

## 2018-05-20 DIAGNOSIS — R16.0 LIVER MASS: ICD-10-CM

## 2018-05-20 DIAGNOSIS — N10 ACUTE PYELONEPHRITIS: Primary | ICD-10-CM

## 2018-05-20 PROBLEM — N12 PYELONEPHRITIS: Status: ACTIVE | Noted: 2018-05-20

## 2018-05-20 LAB
ALBUMIN SERPL-MCNC: 3.5 G/DL (ref 3.5–5)
ALBUMIN/GLOB SERPL: 0.9 {RATIO} (ref 1.1–2.2)
ALP SERPL-CCNC: 90 U/L (ref 45–117)
ALT SERPL-CCNC: 46 U/L (ref 12–78)
ANION GAP SERPL CALC-SCNC: 5 MMOL/L (ref 5–15)
APPEARANCE UR: ABNORMAL
AST SERPL-CCNC: 27 U/L (ref 15–37)
BACTERIA URNS QL MICRO: ABNORMAL /HPF
BASOPHILS # BLD: 0 K/UL (ref 0–0.1)
BASOPHILS # BLD: 0 K/UL (ref 0–0.1)
BASOPHILS NFR BLD: 0 % (ref 0–1)
BASOPHILS NFR BLD: 1 % (ref 0–1)
BILIRUB SERPL-MCNC: 0.4 MG/DL (ref 0.2–1)
BILIRUB UR QL: NEGATIVE
BUN SERPL-MCNC: 13 MG/DL (ref 6–20)
BUN/CREAT SERPL: 18 (ref 12–20)
CALCIUM SERPL-MCNC: 9.7 MG/DL (ref 8.5–10.1)
CHLORIDE SERPL-SCNC: 106 MMOL/L (ref 97–108)
CO2 SERPL-SCNC: 28 MMOL/L (ref 21–32)
COLOR UR: ABNORMAL
CREAT SERPL-MCNC: 0.74 MG/DL (ref 0.55–1.02)
DIFFERENTIAL METHOD BLD: ABNORMAL
DIFFERENTIAL METHOD BLD: ABNORMAL
EOSINOPHIL # BLD: 0.2 K/UL (ref 0–0.4)
EOSINOPHIL # BLD: 0.2 K/UL (ref 0–0.4)
EOSINOPHIL NFR BLD: 2 % (ref 0–7)
EOSINOPHIL NFR BLD: 2 % (ref 0–7)
EPITH CASTS URNS QL MICRO: ABNORMAL /LPF
ERYTHROCYTE [DISTWIDTH] IN BLOOD BY AUTOMATED COUNT: 13.2 % (ref 11.5–14.5)
ERYTHROCYTE [DISTWIDTH] IN BLOOD BY AUTOMATED COUNT: 13.2 % (ref 11.5–14.5)
GLOBULIN SER CALC-MCNC: 4 G/DL (ref 2–4)
GLUCOSE SERPL-MCNC: 217 MG/DL (ref 65–100)
GLUCOSE UR STRIP.AUTO-MCNC: >1000 MG/DL
HCT VFR BLD AUTO: 47.3 % (ref 35–47)
HCT VFR BLD AUTO: 48 % (ref 35–47)
HGB BLD-MCNC: 16.1 G/DL (ref 11.5–16)
HGB BLD-MCNC: 16.1 G/DL (ref 11.5–16)
HGB UR QL STRIP: ABNORMAL
HYALINE CASTS URNS QL MICRO: ABNORMAL /LPF (ref 0–5)
IMM GRANULOCYTES # BLD: 0 K/UL (ref 0–0.04)
IMM GRANULOCYTES # BLD: 0 K/UL (ref 0–0.04)
IMM GRANULOCYTES NFR BLD AUTO: 0 % (ref 0–0.5)
IMM GRANULOCYTES NFR BLD AUTO: 0 % (ref 0–0.5)
KETONES UR QL STRIP.AUTO: ABNORMAL MG/DL
LEUKOCYTE ESTERASE UR QL STRIP.AUTO: ABNORMAL
LIPASE SERPL-CCNC: 84 U/L (ref 73–393)
LYMPHOCYTES # BLD: 3 K/UL (ref 0.8–3.5)
LYMPHOCYTES # BLD: 3.3 K/UL (ref 0.8–3.5)
LYMPHOCYTES NFR BLD: 40 % (ref 12–49)
LYMPHOCYTES NFR BLD: 40 % (ref 12–49)
MCH RBC QN AUTO: 29.8 PG (ref 26–34)
MCH RBC QN AUTO: 30 PG (ref 26–34)
MCHC RBC AUTO-ENTMCNC: 33.5 G/DL (ref 30–36.5)
MCHC RBC AUTO-ENTMCNC: 34 G/DL (ref 30–36.5)
MCV RBC AUTO: 88.1 FL (ref 80–99)
MCV RBC AUTO: 88.9 FL (ref 80–99)
MONOCYTES # BLD: 0.5 K/UL (ref 0–1)
MONOCYTES # BLD: 0.6 K/UL (ref 0–1)
MONOCYTES NFR BLD: 7 % (ref 5–13)
MONOCYTES NFR BLD: 8 % (ref 5–13)
NEUTS SEG # BLD: 3.8 K/UL (ref 1.8–8)
NEUTS SEG # BLD: 4 K/UL (ref 1.8–8)
NEUTS SEG NFR BLD: 49 % (ref 32–75)
NEUTS SEG NFR BLD: 50 % (ref 32–75)
NITRITE UR QL STRIP.AUTO: NEGATIVE
NRBC # BLD: 0 K/UL (ref 0–0.01)
NRBC # BLD: 0 K/UL (ref 0–0.01)
NRBC BLD-RTO: 0 PER 100 WBC
NRBC BLD-RTO: 0 PER 100 WBC
PH UR STRIP: 5.5 [PH] (ref 5–8)
PLATELET # BLD AUTO: 239 K/UL (ref 150–400)
PLATELET # BLD AUTO: 245 K/UL (ref 150–400)
PMV BLD AUTO: 10.6 FL (ref 8.9–12.9)
PMV BLD AUTO: 10.7 FL (ref 8.9–12.9)
POTASSIUM SERPL-SCNC: 3.9 MMOL/L (ref 3.5–5.1)
PROT SERPL-MCNC: 7.5 G/DL (ref 6.4–8.2)
PROT UR STRIP-MCNC: ABNORMAL MG/DL
RBC # BLD AUTO: 5.37 M/UL (ref 3.8–5.2)
RBC # BLD AUTO: 5.4 M/UL (ref 3.8–5.2)
RBC #/AREA URNS HPF: ABNORMAL /HPF (ref 0–5)
SODIUM SERPL-SCNC: 139 MMOL/L (ref 136–145)
SP GR UR REFRACTOMETRY: >1.03 (ref 1–1.03)
UA: UC IF INDICATED,UAUC: ABNORMAL
UROBILINOGEN UR QL STRIP.AUTO: 0.2 EU/DL (ref 0.2–1)
WBC # BLD AUTO: 7.5 K/UL (ref 3.6–11)
WBC # BLD AUTO: 8.2 K/UL (ref 3.6–11)
WBC URNS QL MICRO: >100 /HPF (ref 0–4)

## 2018-05-20 PROCEDURE — 74011250637 HC RX REV CODE- 250/637: Performed by: PHYSICIAN ASSISTANT

## 2018-05-20 PROCEDURE — 96365 THER/PROPH/DIAG IV INF INIT: CPT

## 2018-05-20 PROCEDURE — 74011250636 HC RX REV CODE- 250/636: Performed by: EMERGENCY MEDICINE

## 2018-05-20 PROCEDURE — 87086 URINE CULTURE/COLONY COUNT: CPT | Performed by: EMERGENCY MEDICINE

## 2018-05-20 PROCEDURE — 74011636320 HC RX REV CODE- 636/320: Performed by: EMERGENCY MEDICINE

## 2018-05-20 PROCEDURE — 96375 TX/PRO/DX INJ NEW DRUG ADDON: CPT

## 2018-05-20 PROCEDURE — 80053 COMPREHEN METABOLIC PANEL: CPT | Performed by: EMERGENCY MEDICINE

## 2018-05-20 PROCEDURE — 85025 COMPLETE CBC W/AUTO DIFF WBC: CPT | Performed by: EMERGENCY MEDICINE

## 2018-05-20 PROCEDURE — 96361 HYDRATE IV INFUSION ADD-ON: CPT

## 2018-05-20 PROCEDURE — 74011000258 HC RX REV CODE- 258: Performed by: EMERGENCY MEDICINE

## 2018-05-20 PROCEDURE — 87040 BLOOD CULTURE FOR BACTERIA: CPT | Performed by: EMERGENCY MEDICINE

## 2018-05-20 PROCEDURE — 74011250636 HC RX REV CODE- 250/636: Performed by: PHYSICIAN ASSISTANT

## 2018-05-20 PROCEDURE — 96376 TX/PRO/DX INJ SAME DRUG ADON: CPT

## 2018-05-20 PROCEDURE — 65270000029 HC RM PRIVATE

## 2018-05-20 PROCEDURE — 74178 CT ABD&PLV WO CNTR FLWD CNTR: CPT

## 2018-05-20 PROCEDURE — 36415 COLL VENOUS BLD VENIPUNCTURE: CPT | Performed by: EMERGENCY MEDICINE

## 2018-05-20 PROCEDURE — 81001 URINALYSIS AUTO W/SCOPE: CPT | Performed by: EMERGENCY MEDICINE

## 2018-05-20 PROCEDURE — 99283 EMERGENCY DEPT VISIT LOW MDM: CPT

## 2018-05-20 PROCEDURE — 83690 ASSAY OF LIPASE: CPT | Performed by: EMERGENCY MEDICINE

## 2018-05-20 PROCEDURE — 74011636320 HC RX REV CODE- 636/320: Performed by: PHYSICIAN ASSISTANT

## 2018-05-20 RX ORDER — OXYCODONE AND ACETAMINOPHEN 5; 325 MG/1; MG/1
1 TABLET ORAL
Status: DISCONTINUED | OUTPATIENT
Start: 2018-05-20 | End: 2018-05-22

## 2018-05-20 RX ORDER — SODIUM CHLORIDE 0.9 % (FLUSH) 0.9 %
5-10 SYRINGE (ML) INJECTION AS NEEDED
Status: DISCONTINUED | OUTPATIENT
Start: 2018-05-20 | End: 2018-05-24 | Stop reason: HOSPADM

## 2018-05-20 RX ORDER — SODIUM CHLORIDE 9 MG/ML
50 INJECTION, SOLUTION INTRAVENOUS
Status: COMPLETED | OUTPATIENT
Start: 2018-05-20 | End: 2018-05-20

## 2018-05-20 RX ORDER — NALOXONE HYDROCHLORIDE 0.4 MG/ML
0.4 INJECTION, SOLUTION INTRAMUSCULAR; INTRAVENOUS; SUBCUTANEOUS AS NEEDED
Status: DISCONTINUED | OUTPATIENT
Start: 2018-05-20 | End: 2018-05-24 | Stop reason: HOSPADM

## 2018-05-20 RX ORDER — ACETAMINOPHEN 325 MG/1
650 TABLET ORAL
Status: DISCONTINUED | OUTPATIENT
Start: 2018-05-20 | End: 2018-05-24 | Stop reason: HOSPADM

## 2018-05-20 RX ORDER — ONDANSETRON 4 MG/1
4 TABLET, ORALLY DISINTEGRATING ORAL
Status: COMPLETED | OUTPATIENT
Start: 2018-05-20 | End: 2018-05-20

## 2018-05-20 RX ORDER — SODIUM CHLORIDE 0.9 % (FLUSH) 0.9 %
5-10 SYRINGE (ML) INJECTION EVERY 8 HOURS
Status: DISCONTINUED | OUTPATIENT
Start: 2018-05-20 | End: 2018-05-24 | Stop reason: HOSPADM

## 2018-05-20 RX ORDER — ONDANSETRON 2 MG/ML
4 INJECTION INTRAMUSCULAR; INTRAVENOUS
Status: DISCONTINUED | OUTPATIENT
Start: 2018-05-20 | End: 2018-05-24 | Stop reason: HOSPADM

## 2018-05-20 RX ORDER — SODIUM CHLORIDE 0.9 % (FLUSH) 0.9 %
10 SYRINGE (ML) INJECTION
Status: COMPLETED | OUTPATIENT
Start: 2018-05-20 | End: 2018-05-20

## 2018-05-20 RX ORDER — MORPHINE SULFATE 4 MG/ML
4 INJECTION INTRAVENOUS ONCE
Status: COMPLETED | OUTPATIENT
Start: 2018-05-20 | End: 2018-05-20

## 2018-05-20 RX ORDER — ENOXAPARIN SODIUM 100 MG/ML
40 INJECTION SUBCUTANEOUS EVERY 24 HOURS
Status: DISCONTINUED | OUTPATIENT
Start: 2018-05-21 | End: 2018-05-24 | Stop reason: HOSPADM

## 2018-05-20 RX ORDER — MORPHINE SULFATE 4 MG/ML
2 INJECTION INTRAVENOUS
Status: COMPLETED | OUTPATIENT
Start: 2018-05-20 | End: 2018-05-20

## 2018-05-20 RX ORDER — FACIAL-BODY WIPES
10 EACH TOPICAL DAILY PRN
Status: DISCONTINUED | OUTPATIENT
Start: 2018-05-20 | End: 2018-05-24 | Stop reason: HOSPADM

## 2018-05-20 RX ADMIN — DIATRIZOATE MEGLUMINE AND DIATRIZOATE SODIUM 30 ML: 660; 100 LIQUID ORAL; RECTAL at 19:20

## 2018-05-20 RX ADMIN — ONDANSETRON 4 MG: 4 TABLET, ORALLY DISINTEGRATING ORAL at 19:07

## 2018-05-20 RX ADMIN — SODIUM CHLORIDE 1000 ML: 900 INJECTION, SOLUTION INTRAVENOUS at 19:24

## 2018-05-20 RX ADMIN — CEFTRIAXONE 1 G: 1 INJECTION, POWDER, FOR SOLUTION INTRAMUSCULAR; INTRAVENOUS at 22:49

## 2018-05-20 RX ADMIN — MORPHINE SULFATE 4 MG: 4 INJECTION INTRAVENOUS at 20:41

## 2018-05-20 RX ADMIN — MORPHINE SULFATE 2 MG: 4 INJECTION INTRAVENOUS at 19:20

## 2018-05-20 RX ADMIN — IOPAMIDOL 100 ML: 755 INJECTION, SOLUTION INTRAVENOUS at 21:28

## 2018-05-20 RX ADMIN — SODIUM CHLORIDE 50 ML/HR: 900 INJECTION, SOLUTION INTRAVENOUS at 21:28

## 2018-05-20 RX ADMIN — Medication 10 ML: at 21:28

## 2018-05-20 NOTE — IP AVS SNAPSHOT
[address] [facility] 
[phone] Patient: Valerie Weldon MRN: [id number] CFF:[id number]/[id number] About your hospitalization You were admitted on:  May 20, 2018 You last received care in the:  [facility] 3 [facility] You were discharged on:  May 24, 2018 Why you were hospitalized Your primary diagnosis was:  Not on File Your diagnoses also included:  Pyelonephritis, Uti (Urinary Tract Infection), Liver Mass, Type Ii Diabetes Mellitus (Hcc) Follow-up Information Follow up With Details Comments Contact Info [facility] DIABETIC TREATMENT On 6/8/2018 Diabetes Education Class on Friday, June 8th at 1:00pm. Please bring photo ID and inusurance information with you. Please arrive 15 minutes early for check in [facility] 81. [address] [facility] 
[phone] Dwayne Edward MD Go on 5/25/2018 For hospital follow up appointment at 3:00PM  [address] [place] [address] 
[phone] Pool Lujan MD In 1 week  [address] [facility] [address] [facility] 
[phone] Dwayne Edward MD In 1 week  [address] [place] [address] 
[phone] [facility]  [phone] Your Scheduled Appointments Friday June 08, 2018  1:00 PM EDT  
DIABETES CLASS 1HR with SURVIVAL SKILLS CLASS [facility] DIABETIC TREATMENT ([address]) [place] [address]. [facility]  
[phone] Discharge Orders None A check cristo indicates which time of day the medication should be taken. My Medications START taking these medications Instructions Each Dose to Equal  
 Morning Noon Evening Bedtime  
 cefUROXime 500 mg tablet Commonly known as:  CEFTIN Your last dose was: Your next dose is: Take 1 Tab by mouth two (2) times a day for 5 days. 500 mg  
    
   
   
   
  
 glipiZIDE SR 5 mg CR tablet Commonly known as:  GLUCOTROL XL Your last dose was: Your next dose is: Take 1 Tab by mouth daily for 30 days. 5 mg  
    
   
   
   
  
 glucose blood VI test strips strip Commonly known as:  CONTOUR NEXT TEST STRIPS Your last dose was: Your next dose is: As directed  
     
   
   
   
  
 insulin glargine 100 unit/mL (3 mL) Inpn Commonly known as:  LANTUS SOLOSTAR U-100 INSULIN Your last dose was: Your next dose is:    
   
   
 20 units sq qhs Lancets Misc Your last dose was: Your next dose is:    
   
   
 Check BG 4x/day before meals and bedtime  
     
   
   
   
  
 metFORMIN 500 mg Tg24 24 hour tablet Commonly known [facility] Your last dose was: Your next dose is: Take 500 mg by mouth two (2) times a day for 30 days. 500 mg  
    
   
   
   
  
 miconazole 2 % vaginal cream  
Commonly known as:  Tonjenn Lopez Your last dose was: Your next dose is: Insert 1 Applicator into vagina nightly for 5 days. 1 Applicator  
    
   
   
   
  
 oxyCODONE-acetaminophen 5-325 mg per tablet Commonly known as:  PERCOCET Your last dose was: Your next dose is: Take 2 Tabs by mouth every six (6) hours as needed. Max Daily Amount: 8 Tabs. 2 Tab CONTINUE taking these medications Instructions Each Dose to Equal  
 Morning Noon Evening Bedtime  
 hydroCHLOROthiazide 25 mg tablet Commonly known as:  HYDRODIURIL Your last dose was: Your next dose is: Take 25 mg by mouth daily. 25 mg  
    
   
   
   
  
 ondansetron 4 mg disintegrating tablet Commonly known as:  ZOFRAN ODT Your last dose was: Your next dose is: Take 1 Tab by mouth every eight (8) hours as needed for Nausea. 4 mg STOP taking these medications   
 dicyclomine 10 mg capsule Commonly known as:  BENTYL  
   
  
 lidocaine 4 % topical cream  
Commonly known as:  XYLOCAINE  
   
  
 oxyCODONE IR 5 mg immediate release tablet Commonly known as:  Silviano Vela Where to Get Your Medications Information on where to get these meds will be given to you by the nurse or doctor. ! Ask your nurse or doctor about these medications  
  cefUROXime 500 mg tablet  
 glipiZIDE SR 5 mg CR tablet  
 glucose blood VI test strips strip  
 insulin glargine 100 unit/mL (3 mL) Inpn Lancets Misc  
 metFORMIN 500 mg Tg24 24 hour tablet  
 miconazole 2 % vaginal cream  
 oxyCODONE-acetaminophen 5-325 mg per tablet Opioid Education Prescription Opioids: What You Need to Know: 
 
 
NAME: Emmett Irvin :  1960 MRN:  [id number] Date/Time:  2018 12:57 PM 
 
ADMIT DATE: 2018 DISCHARGE DATE: 2018 · It is important that you take the medication exactly as they are prescribed. · Keep your medication in the bottles provided by the pharmacist and keep a list of the medication names, dosages, and times to be taken in your wallet. · Do not take other medications without consulting your doctor. What to do at [facility] Recommended diet:  Diabetic Diet Recommended activity: Activity as tolerated, no heavy lifting, post op instructions per radiology recommendations If you have questions regarding the hospital related prescriptions or hospital related issues please call [facility] at . You can always direct your questions to your primary care doctor if you are unable to reach your hospital physician; your PCP works as an extension of your hospital doctor just like your hospital doctor is an extension of your PCP for your time at the hospital [facility]) If you experience any of the following symptoms then please call your primary care physician or return to the emergency room if you cannot get hold of your doctor: 
 
Fever, chills, nausea, vomiting, or persistent diarrhea Worsening weakness or new problems with your speech or balance Dark stools or visible blood in your stools New Leg swelling or shortness of breath as these could be signs of a clot Additional Instructions: F/u with pcp w/in 1 week F/u with GI in 1 week for bx results Call MD if and increasing pain, trouble urinating, fevers or chills Check BG before every meal and bedtime and take record to pcp Bring these papers with you to your follow up appointments. The papers will help your doctors be sure to continue the care plan from the hospital. 
 
 
 
 
 
 
Information obtained by : 
I understand that if any problems occur once I am at home I am to contact my physician. I understand and acknowledge receipt of the instructions indicated above. Physician's or R.N.'s Signature                                                                  Date/Time Patient or Representative Signature [place] Announcement  We are excited to announce that we are making your provider's discharge notes available to you in [employer]. You will see these notes when they are completed and signed by the physician that discharged you from your recent hospital stay. If you have any questions or concerns about any information you see in [employer], please call the Health Information Department where you were seen or reach out to your Primary Care Provider for more information about your plan of care. Introducing [facility]! Yoon Palma introduces [employer] patient portal. Now you can access parts of your medical record, email your doctor's office, and request medication refills online. 1. In your internet browser, go to [email or web address]/"[email or web address] 2. Click on the First Time User? Click Here link in the Sign In box. You will see the New Member Sign Up page. 3. Enter your NativeAD Access Code exactly as it appears below. You will not need to use this code after youve completed the sign-up process. If you do not sign up before the expiration date, you must request a new code. · Native[employer] Access Code: [email or web address] Expires: 8/13/2018  8:34 AM 
 
4. Enter the last four digits of your Social Security Number (xxxx) and Date of Birth (mm/dd/yyyy) as indicated and click Submit. You will be taken to the next sign-up page. 5. Create a NativeAD ID. This will be your NativeAD login ID and cannot be changed, so think of one that is secure and easy to remember. 6. Create a [employer]AD password. You can change your password at any time. 7. Enter your Password Reset Question and Answer. This can be used at a later time if you forget your password. 8. Enter your e-mail address. You will receive e-mail notification when new information is available in [address] 9. Click Sign Up. You can now view and download portions of your medical record. 10. Click the Download Summary menu link to download a portable copy of your medical information. If you have questions, please visit the Frequently Asked Questions section of the MyChart website. Remember, [employer] is NOT to be used for urgent needs. For medical emergencies, dial 911. Now available from your iPhone and Android! Introducing Ger Reynolds As a [employer] patient, I wanted to make you aware of our electronic visit tool called Ger Reynolds. [employer] 24/7 allows you to connect within minutes with a medical provider 24 hours a day, seven days a week via a mobile device or tablet or logging into a secure website from your computer. You can access Ger Reynolds from anywhere in the [place]. A virtual visit might be right for you when you have a simple condition and feel like you just dont want to get out of bed, or cant get away from work for an appointment, when your regular [employer] provider is not available (evenings, weekends or holidays), or when youre out of town and need minor care. Electronic visits cost only $49 and if the [employer] 24/7 provider determines a prescription is needed to treat your condition, one can be electronically transmitted to a nearby pharmacy*. Please take a moment to enroll today if you have not already done so. The enrollment process is free and takes just a few minutes. To enroll, please download the [employer] 24/[employer] lorena to your tablet or phone, or visit [email or web address] to enroll on your computer. And, as an [address] [place] [address] patient with a [employer] account, the results of your visits will be scanned into your electronic medical record and your primary care provider will be able to view the scanned results. We urge you to continue to see your regular Sanjeev Platts provider for your ongoing medical care.   And while your primary care provider may not be the one available when you seek a Ger Reynolds virtual visit, the peace of mind you get from getting a real diagnosis real time can be priceless. For more information on Ger Segurasamantha, view our Frequently Asked Questions (FAQs) at [email or web address]. Sincerely, 
 
Adriana Cuellar MD 
Chief Medical Officer Ty Landers *:  certain medications cannot be prescribed via Ger Reynolds Unresulted Labs-Please follow up with your PCP about these lab tests Order Current Status CULTURE, BLOOD, PAIRED Preliminary result Providers Seen During Your Hospitalization Provider Specialty Primary office phone Sheryl Reilly MD Emergency Medicine [phone] Eloisa Ramirez MD Internal Medicine [phone] Vangie Negron MD Hospitalist [phone] Your Primary Care Physician (PCP) Primary Care Physician Office Phone Office Fax Jasvirkerline Peter [phone] You are allergic to the following No active allergies Recent Documentation Weight BMI OB Status Smoking Status  
  
  
 100.7 kg 35.83 kg/m2 Postmenopausal Current Every Day Smoker Emergency Contacts Name Discharge Info Relation Home Work Mobile [place] DISCHARGE CAREGIVER [3] Sister [23] [phone] Rosenda Perales  Spouse [3] [phone] Patient Belongings The following personal items are in your possession at time of discharge: 
  Dental Appliances: None  Visual Aid: None      Home Medications: None   Jewelry: Bracelet, Ring, Watch, With patient  Clothing: Footwear, Pants, Shirt, Socks, Undergarments, With patient    Other Valuables: At bedside, Cell Phone Please provide this summary of care documentation to your next provider. Signatures-by signing, you are acknowledging that this After Visit Summary has been reviewed with you and you have received a copy.   
  
 
  
    
    
 Patient Signature: ____________________________________________________________ Date:  ____________________________________________________________  
  
Joseph Ala Provider Signature:  ____________________________________________________________ Date:  ____________________________________________________________

## 2018-05-20 NOTE — ED PROVIDER NOTES
EMERGENCY DEPARTMENT HISTORY AND PHYSICAL EXAM      Date: 5/20/2018  Patient Name: Boston Chavez have seen and evaluated this patient in the Express Care portion of triage for abdominal pain, nausea, and vomiting x 5 days. Pt was seen last week and also saw her PCP who scheduled her for an outpatient CT on 5/23. He prescribed her muscle relaxers as well. The patients care will begin now and orders have been placed. This patient will be seen and provided further care in the Emergency Room. Written by [facility], a scribe for Connie Lerner PA-C.    6:39 PM  Spoke with the patient's on call physician who agrees with the care plan. History of Presenting Illness     Chief Complaint   Patient presents with    Abdominal Pain     n/v and abd pain since Tuesday. Pt. seen here Wednesday. History Provided By: Patient    HPI: Marbin Oswald, 63 y.o. female presents to the ED with cc of persistent abdominal pain, nausea, and vomiting for the past 5 days. She was seen at the ED for these symptoms last week and she followed up with her PCP shortly thereafter. Patient states her PCP told her to discontinue some of the pain medications she was prescribed in the ED. She says she was scheduled for a CT with contrast by her PCP, but her pain has continued so she came in to the ED. She states her current symptoms are similar to those she had associated with previous kidney stones. She reports a history of ovarian cysts, but denies every having a cholecystectomy or appendectomy. She denies any diarrhea or any other associated complaints. Chief Complaint: Abdominal pain  Duration: 5 Days  Timing:  Progressive  Quality: Similar to previous kidney stones  Associated Symptoms: Nausea, Vomiting    There are no other complaints, changes, or physical findings at this time.     PCP: None    Current Outpatient Prescriptions   Medication Sig Dispense Refill    hydroCHLOROthiazide (HYDRODIURIL) 25 mg tablet Take 25 mg by mouth daily.  ondansetron (ZOFRAN ODT) 4 mg disintegrating tablet Take 1 Tab by mouth every eight (8) hours as needed for Nausea. 10 Tab 0    dicyclomine (BENTYL) 10 mg capsule Take 1 Cap by mouth three (3) times daily for 5 days. As needed for abdominal cramping 15 Cap 0    oxyCODONE IR (ROXICODONE) 5 mg immediate release tablet Take 1 Tab by mouth every eight (8) hours as needed for Pain for up to 4 doses. Max Daily Amount: 15 mg. 4 Tab 0    lidocaine (XYLOCAINE) 4 % topical cream Apply  to affected area two (2) times daily as needed for Pain for up to 7 days. 15 g 0     Past History     Past Medical History:  History reviewed. No pertinent past medical history. Past Surgical History:  Past Surgical History:   Procedure Laterality Date    HX GYN         Family History:  History reviewed. No pertinent family history. Social History:  Social History   Substance Use Topics    Smoking status: Current Every Day Smoker     Packs/day: 0.25    Smokeless tobacco: Never Used    Alcohol use No       Allergies:  No Known Allergies    Review of Systems   Review of Systems   Constitutional: Negative. Negative for chills and fever. HENT: Negative. Negative for congestion and rhinorrhea. Respiratory: Negative. Negative for cough, chest tightness and wheezing. Cardiovascular: Negative. Negative for chest pain and palpitations. Gastrointestinal: Positive for abdominal pain, nausea and vomiting. Negative for constipation and diarrhea. Endocrine: Negative. Genitourinary: Negative. Negative for decreased urine volume, flank pain, hematuria and pelvic pain. Musculoskeletal: Negative. Negative for back pain and neck pain. Skin: Negative. Negative for color change, pallor and rash. Neurological: Negative. Negative for dizziness, seizures, weakness, numbness and headaches. Hematological: Negative. Negative for adenopathy. Psychiatric/Behavioral: Negative.     All other systems reviewed and are negative. Physical Exam   Physical Exam   Constitutional: She is oriented to person, place, and time. She appears well-developed and well-nourished. No distress. HENT:   Head: Normocephalic and atraumatic. Mouth/Throat: No oropharyngeal exudate. Eyes: Conjunctivae are normal. Pupils are equal, round, and reactive to light. Right eye exhibits no discharge. Left eye exhibits no discharge. No scleral icterus. Neck: Normal range of motion. Neck supple. No JVD present. Cardiovascular: Normal rate, regular rhythm, normal heart sounds and intact distal pulses. Exam reveals no gallop and no friction rub. No murmur heard. Pulmonary/Chest: Effort normal and breath sounds normal. No stridor. No respiratory distress. She has no wheezes. She has no rales. She exhibits no tenderness. Abdominal: Soft. Bowel sounds are normal. She exhibits no distension and no mass. There is no tenderness. There is CVA tenderness. There is no rebound and no guarding. Neurological: She is alert and oriented to person, place, and time. She displays normal reflexes. No cranial nerve deficit. She exhibits normal muscle tone. Coordination normal.   Skin: Skin is warm. No rash noted. She is not diaphoretic. No pallor. Vitals reviewed.     Diagnostic Study Results     Labs -     Recent Results (from the past 12 hour(s))   CBC WITH AUTOMATED DIFF    Collection Time: 05/20/18  5:23 PM   Result Value Ref Range    WBC 7.5 3.6 - 11.0 K/uL    RBC 5.40 (H) 3.80 - 5.20 M/uL    HGB 16.1 (H) 11.5 - 16.0 g/dL    HCT 48.0 (H) 35.0 - 47.0 %    MCV 88.9 80.0 - 99.0 FL    MCH 29.8 26.0 - 34.0 PG    MCHC 33.5 30.0 - 36.5 g/dL    RDW 13.2 11.5 - 14.5 %    PLATELET 755 551 - 480 K/uL    MPV 10.6 8.9 - 12.9 FL    NRBC 0.0 0  WBC    ABSOLUTE NRBC 0.00 0.00 - 0.01 K/uL    NEUTROPHILS 50 32 - 75 %    LYMPHOCYTES 40 12 - 49 %    MONOCYTES 7 5 - 13 %    EOSINOPHILS 2 0 - 7 %    BASOPHILS 1 0 - 1 %    IMMATURE GRANULOCYTES 0 0.0 - 0.5 %    ABS. NEUTROPHILS 3.8 1.8 - 8.0 K/UL    ABS. LYMPHOCYTES 3.0 0.8 - 3.5 K/UL    ABS. MONOCYTES 0.5 0.0 - 1.0 K/UL    ABS. EOSINOPHILS 0.2 0.0 - 0.4 K/UL    ABS. BASOPHILS 0.0 0.0 - 0.1 K/UL    ABS. IMM. GRANS. 0.0 0.00 - 0.04 K/UL    DF AUTOMATED     URINALYSIS W/ REFLEX CULTURE    Collection Time: 05/20/18  7:19 PM   Result Value Ref Range    Color YELLOW/STRAW      Appearance CLOUDY (A) CLEAR      Specific gravity >1.030 (H) 1.003 - 1.030    pH (UA) 5.5 5.0 - 8.0      Protein TRACE (A) NEG mg/dL    Glucose >1000 (A) NEG mg/dL    Ketone TRACE (A) NEG mg/dL    Bilirubin NEGATIVE  NEG      Blood SMALL (A) NEG      Urobilinogen 0.2 0.2 - 1.0 EU/dL    Nitrites NEGATIVE  NEG      Leukocyte Esterase LARGE (A) NEG      UA:UC IF INDICATED URINE CULTURE ORDERED (A) CNI      WBC >100 (H) 0 - 4 /hpf    RBC 5-10 0 - 5 /hpf    Epithelial cells MANY (A) FEW /lpf    Bacteria 2+ (A) NEG /hpf    Hyaline cast 2-5 0 - 5 /lpf   METABOLIC PANEL, COMPREHENSIVE    Collection Time: 05/20/18  7:19 PM   Result Value Ref Range    Sodium 139 136 - 145 mmol/L    Potassium 3.9 3.5 - 5.1 mmol/L    Chloride 106 97 - 108 mmol/L    CO2 28 21 - 32 mmol/L    Anion gap 5 5 - 15 mmol/L    Glucose 217 (H) 65 - 100 mg/dL    BUN 13 6 - 20 MG/DL    Creatinine 0.74 0.55 - 1.02 MG/DL    BUN/Creatinine ratio 18 12 - 20      GFR est AA >60 >60 ml/min/1.73m2    GFR est non-AA >60 >60 ml/min/1.73m2    Calcium 9.7 8.5 - 10.1 MG/DL    Bilirubin, total 0.4 0.2 - 1.0 MG/DL    ALT (SGPT) 46 12 - 78 U/L    AST (SGOT) 27 15 - 37 U/L    Alk.  phosphatase 90 45 - 117 U/L    Protein, total 7.5 6.4 - 8.2 g/dL    Albumin 3.5 3.5 - 5.0 g/dL    Globulin 4.0 2.0 - 4.0 g/dL    A-G Ratio 0.9 (L) 1.1 - 2.2     CBC WITH AUTOMATED DIFF    Collection Time: 05/20/18  7:19 PM   Result Value Ref Range    WBC 8.2 3.6 - 11.0 K/uL    RBC 5.37 (H) 3.80 - 5.20 M/uL    HGB 16.1 (H) 11.5 - 16.0 g/dL    HCT 47.3 (H) 35.0 - 47.0 %    MCV 88.1 80.0 - 99.0 FL    MCH 30.0 26.0 - 34.0 PG MCHC 34.0 30.0 - 36.5 g/dL    RDW 13.2 11.5 - 14.5 %    PLATELET 026 469 - 896 K/uL    MPV 10.7 8.9 - 12.9 FL    NRBC 0.0 0  WBC    ABSOLUTE NRBC 0.00 0.00 - 0.01 K/uL    NEUTROPHILS 49 32 - 75 %    LYMPHOCYTES 40 12 - 49 %    MONOCYTES 8 5 - 13 %    EOSINOPHILS 2 0 - 7 %    BASOPHILS 0 0 - 1 %    IMMATURE GRANULOCYTES 0 0.0 - 0.5 %    ABS. NEUTROPHILS 4.0 1.8 - 8.0 K/UL    ABS. LYMPHOCYTES 3.3 0.8 - 3.5 K/UL    ABS. MONOCYTES 0.6 0.0 - 1.0 K/UL    ABS. EOSINOPHILS 0.2 0.0 - 0.4 K/UL    ABS. BASOPHILS 0.0 0.0 - 0.1 K/UL    ABS. IMM. GRANS. 0.0 0.00 - 0.04 K/UL    DF AUTOMATED     LIPASE    Collection Time: 05/20/18  7:19 PM   Result Value Ref Range    Lipase 84 73 - 393 U/L       Radiologic Studies -   CT ABDOMEN W WO CONT AND PELVIS W CONT   Final Result        CT Results  (Last 48 hours)               05/20/18 2131  CT ABDOMEN W WO CONT AND PELVIS W CONT Final result    Impression:  IMPRESSION:   1. A hypoenhancing hepatic mass remains indeterminate. A solitary metastasis is   favored. It is amenable to percutaneous biopsy. 2. No hepatic steatosis. Narrative:  CT ABDOMEN AND PELVIS WITH AND WITHOUT CONTRAST. 5/20/2018 9:31 PM        INDICATION: Abdominal pain, liver lesion. COMPARISON: 5/15/2018, 3/4/2010. TECHNIQUE: CT of the abdomen and pelvis was performed. Images of the abdomen   were obtained before contrast and in the arterial, portal venous, and delayed   phases. Pelvis acquisition was performed in venous phase after contrast   administration only. 100 cc IV contrast (Isovue 370) and oral contrast were   administered. CT dose reduction was achieved through use of a standardized   protocol tailored for this examination and automatic exposure control for dose   modulation. FINDINGS:   Liver: A hypoenhancing, 16 mm, hepatic mass at the junction of segments 5 and 6   remains hypodense to liver on all postcontrast phases.  This remains   indeterminate, though a solitary metastasis is favored. It is amenable to   percutaneous biopsy. On unenhanced images, the liver is denser than the spleen,   this is inconsistent with hepatic steatosis (as previously reported). Abdomen: An accessory right lower pole renal artery is a normal variant. Incidental note is made of a gallstone. Incidental note is made of a right renal   cyst and subcentimeter hypodense bilateral renal lesions which are too small to   characterize, but likely also represent cysts. The lung bases are clear. The   heart size is normal. There is a tiny hiatal hernia. The stomach, duodenum,   pancreas, spleen, and adrenals are normal.       Pelvis: The small bowel, ileocecal junction, appendix, colon, and bladder are   normal. No free air or fluid, and no abdominopelvic lymphadenopathy. Medical Decision Making   I am the first provider for this patient. I reviewed the vital signs, available nursing notes, past medical history, past surgical history, family history and social history. Vital Signs-Reviewed the patient's vital signs. Patient Vitals for the past 12 hrs:   Temp Pulse Resp BP SpO2   05/20/18 1717 98.7 °F (37.1 °C) (!) 107 18 165/90 100 %     Records Reviewed: Nursing Notes and Old Medical Records    Provider Notes (Medical Decision Making):   DDx: Biliary colic, Cholecystitis, Hepatitis, Pancreatitis, Renal colic, Radicular back pain, Aortic aneurysm, Appendicitis, Complicated UTI    Impression/Plan: Patient presents with recurrent right lower abdominal and back pain since last week. Seen here with CT showing gallstones but otherwise normal. Had close follow up to PCP, who wanted to perform CT with contrast. She came today for uncontrolled pain. Plan will be to repeat labs, CT with oral contrast, disposition pending those results. ED Course:   Initial assessment performed.  The patients presenting problems have been discussed, and they are in agreement with the care plan formulated and outlined with them. I have encouraged them to ask questions as they arise throughout their visit. Progress Note:  10:16 PM   Patient and family made aware of concerns for hepatic mass. Consult Note:   10:18 PM  Liang Marie MD spoke with Dr Chrissy Burris,   Specialty: Hospitalist  Discussed pt's hx, disposition, and available diagnostic and imaging results. Reviewed care plans. Consultant will evaluate pt for admission. Written by Cristina Ling ED Scribe, as dictated by Liang Marie MD.     Disposition:  Admit Note:  10:19 PM  Pt is being admitted by Dr Marc Ramos. The results of their tests and reason(s) for their admission have been discussed with pt and/or available family. They convey agreement and understanding for the need to be admitted and for admission diagnosis. PLAN:  1. Admission to hospitalist     Diagnosis     Clinical Impression:   1. Acute pyelonephritis    2. Liver mass        Attestations: This note is prepared by Cristina Ling, acting as Scribe for MD Liang Cheng MD: The scribe's documentation has been prepared under my direction and personally reviewed by me in its entirety. I confirm that the note above accurately reflects all work, treatment, procedures, and medical decision making performed by me.

## 2018-05-20 NOTE — ED TRIAGE NOTES
Assumed care of this patient. She is alert and oriented x4. Patient reports that she has been having abd pain/cramping and mid back pain since Tuesday. She was seen in the ED Wednesday and prescribed bentyl, roxicodone and zofran. She f/u with her PCP the following day and was instructed to stop taking the roxicodone and to take flexeril and ibuprofen. Patient reports that the pain has not gotten any better and she began vomiting x3 episodes today.

## 2018-05-21 LAB
ALBUMIN SERPL-MCNC: 2.9 G/DL (ref 3.5–5)
ALBUMIN/GLOB SERPL: 0.8 {RATIO} (ref 1.1–2.2)
ALP SERPL-CCNC: 80 U/L (ref 45–117)
ALT SERPL-CCNC: 38 U/L (ref 12–78)
ANION GAP SERPL CALC-SCNC: 7 MMOL/L (ref 5–15)
AST SERPL-CCNC: 25 U/L (ref 15–37)
BASOPHILS # BLD: 0 K/UL (ref 0–0.1)
BASOPHILS NFR BLD: 1 % (ref 0–1)
BILIRUB SERPL-MCNC: 0.3 MG/DL (ref 0.2–1)
BUN SERPL-MCNC: 11 MG/DL (ref 6–20)
BUN/CREAT SERPL: 17 (ref 12–20)
CALCIUM SERPL-MCNC: 8.6 MG/DL (ref 8.5–10.1)
CHLORIDE SERPL-SCNC: 106 MMOL/L (ref 97–108)
CO2 SERPL-SCNC: 25 MMOL/L (ref 21–32)
CREAT SERPL-MCNC: 0.65 MG/DL (ref 0.55–1.02)
DIFFERENTIAL METHOD BLD: NORMAL
EOSINOPHIL # BLD: 0.2 K/UL (ref 0–0.4)
EOSINOPHIL NFR BLD: 3 % (ref 0–7)
ERYTHROCYTE [DISTWIDTH] IN BLOOD BY AUTOMATED COUNT: 13.2 % (ref 11.5–14.5)
EST. AVERAGE GLUCOSE BLD GHB EST-MCNC: 266 MG/DL
GLOBULIN SER CALC-MCNC: 3.5 G/DL (ref 2–4)
GLUCOSE BLD STRIP.AUTO-MCNC: 155 MG/DL (ref 65–100)
GLUCOSE BLD STRIP.AUTO-MCNC: 261 MG/DL (ref 65–100)
GLUCOSE SERPL-MCNC: 308 MG/DL (ref 65–100)
HBA1C MFR BLD: 10.9 % (ref 4.2–6.3)
HCT VFR BLD AUTO: 42.6 % (ref 35–47)
HGB BLD-MCNC: 14.3 G/DL (ref 11.5–16)
IMM GRANULOCYTES # BLD: 0 K/UL (ref 0–0.04)
IMM GRANULOCYTES NFR BLD AUTO: 0 % (ref 0–0.5)
LYMPHOCYTES # BLD: 2.8 K/UL (ref 0.8–3.5)
LYMPHOCYTES NFR BLD: 48 % (ref 12–49)
MCH RBC QN AUTO: 29.8 PG (ref 26–34)
MCHC RBC AUTO-ENTMCNC: 33.6 G/DL (ref 30–36.5)
MCV RBC AUTO: 88.8 FL (ref 80–99)
MONOCYTES # BLD: 0.5 K/UL (ref 0–1)
MONOCYTES NFR BLD: 8 % (ref 5–13)
NEUTS SEG # BLD: 2.4 K/UL (ref 1.8–8)
NEUTS SEG NFR BLD: 41 % (ref 32–75)
NRBC # BLD: 0 K/UL (ref 0–0.01)
NRBC BLD-RTO: 0 PER 100 WBC
PLATELET # BLD AUTO: 203 K/UL (ref 150–400)
PMV BLD AUTO: 10.8 FL (ref 8.9–12.9)
POTASSIUM SERPL-SCNC: 3.6 MMOL/L (ref 3.5–5.1)
PROT SERPL-MCNC: 6.4 G/DL (ref 6.4–8.2)
RBC # BLD AUTO: 4.8 M/UL (ref 3.8–5.2)
SERVICE CMNT-IMP: ABNORMAL
SERVICE CMNT-IMP: ABNORMAL
SODIUM SERPL-SCNC: 138 MMOL/L (ref 136–145)
WBC # BLD AUTO: 5.9 K/UL (ref 3.6–11)

## 2018-05-21 PROCEDURE — 74011250637 HC RX REV CODE- 250/637: Performed by: INTERNAL MEDICINE

## 2018-05-21 PROCEDURE — 85025 COMPLETE CBC W/AUTO DIFF WBC: CPT | Performed by: INTERNAL MEDICINE

## 2018-05-21 PROCEDURE — 80053 COMPREHEN METABOLIC PANEL: CPT | Performed by: INTERNAL MEDICINE

## 2018-05-21 PROCEDURE — 74011636637 HC RX REV CODE- 636/637: Performed by: HOSPITALIST

## 2018-05-21 PROCEDURE — 74011000258 HC RX REV CODE- 258: Performed by: HOSPITALIST

## 2018-05-21 PROCEDURE — C9113 INJ PANTOPRAZOLE SODIUM, VIA: HCPCS | Performed by: INTERNAL MEDICINE

## 2018-05-21 PROCEDURE — 74011250636 HC RX REV CODE- 250/636: Performed by: INTERNAL MEDICINE

## 2018-05-21 PROCEDURE — 36415 COLL VENOUS BLD VENIPUNCTURE: CPT

## 2018-05-21 PROCEDURE — 74011000250 HC RX REV CODE- 250: Performed by: INTERNAL MEDICINE

## 2018-05-21 PROCEDURE — 74011250636 HC RX REV CODE- 250/636: Performed by: HOSPITALIST

## 2018-05-21 PROCEDURE — 65270000029 HC RM PRIVATE

## 2018-05-21 PROCEDURE — 82962 GLUCOSE BLOOD TEST: CPT

## 2018-05-21 PROCEDURE — 83036 HEMOGLOBIN GLYCOSYLATED A1C: CPT

## 2018-05-21 RX ORDER — DEXTROSE 50 % IN WATER (D50W) INTRAVENOUS SYRINGE
12.5-25 AS NEEDED
Status: DISCONTINUED | OUTPATIENT
Start: 2018-05-21 | End: 2018-05-24 | Stop reason: HOSPADM

## 2018-05-21 RX ORDER — INSULIN LISPRO 100 [IU]/ML
INJECTION, SOLUTION INTRAVENOUS; SUBCUTANEOUS
Status: DISCONTINUED | OUTPATIENT
Start: 2018-05-21 | End: 2018-05-24 | Stop reason: HOSPADM

## 2018-05-21 RX ORDER — SODIUM CHLORIDE 9 MG/ML
75 INJECTION, SOLUTION INTRAVENOUS CONTINUOUS
Status: DISCONTINUED | OUTPATIENT
Start: 2018-05-21 | End: 2018-05-22

## 2018-05-21 RX ORDER — MORPHINE SULFATE 4 MG/ML
2 INJECTION INTRAVENOUS
Status: DISCONTINUED | OUTPATIENT
Start: 2018-05-21 | End: 2018-05-22

## 2018-05-21 RX ORDER — MAGNESIUM SULFATE 100 %
4 CRYSTALS MISCELLANEOUS AS NEEDED
Status: DISCONTINUED | OUTPATIENT
Start: 2018-05-21 | End: 2018-05-24 | Stop reason: HOSPADM

## 2018-05-21 RX ADMIN — Medication 10 ML: at 09:32

## 2018-05-21 RX ADMIN — MORPHINE SULFATE 2 MG: 4 INJECTION INTRAVENOUS at 21:45

## 2018-05-21 RX ADMIN — SODIUM CHLORIDE 75 ML/HR: 900 INJECTION, SOLUTION INTRAVENOUS at 02:11

## 2018-05-21 RX ADMIN — MORPHINE SULFATE 2 MG: 4 INJECTION INTRAVENOUS at 05:11

## 2018-05-21 RX ADMIN — ENOXAPARIN SODIUM 40 MG: 40 INJECTION SUBCUTANEOUS at 09:15

## 2018-05-21 RX ADMIN — Medication 10 ML: at 05:11

## 2018-05-21 RX ADMIN — Medication 10 ML: at 16:32

## 2018-05-21 RX ADMIN — Medication 10 ML: at 21:41

## 2018-05-21 RX ADMIN — OXYCODONE HYDROCHLORIDE AND ACETAMINOPHEN 1 TABLET: 5; 325 TABLET ORAL at 10:35

## 2018-05-21 RX ADMIN — INSULIN LISPRO 2 UNITS: 100 INJECTION, SOLUTION INTRAVENOUS; SUBCUTANEOUS at 17:39

## 2018-05-21 RX ADMIN — MORPHINE SULFATE 2 MG: 4 INJECTION INTRAVENOUS at 16:32

## 2018-05-21 RX ADMIN — OXYCODONE HYDROCHLORIDE AND ACETAMINOPHEN 1 TABLET: 5; 325 TABLET ORAL at 17:38

## 2018-05-21 RX ADMIN — SODIUM CHLORIDE 75 ML/HR: 900 INJECTION, SOLUTION INTRAVENOUS at 19:59

## 2018-05-21 RX ADMIN — Medication 10 ML: at 01:30

## 2018-05-21 RX ADMIN — CEFTRIAXONE 1 G: 1 INJECTION, POWDER, FOR SOLUTION INTRAMUSCULAR; INTRAVENOUS at 21:37

## 2018-05-21 RX ADMIN — MORPHINE SULFATE 2 MG: 4 INJECTION INTRAVENOUS at 09:32

## 2018-05-21 RX ADMIN — SODIUM CHLORIDE 40 MG: 9 INJECTION INTRAMUSCULAR; INTRAVENOUS; SUBCUTANEOUS at 09:15

## 2018-05-21 RX ADMIN — MORPHINE SULFATE 2 MG: 4 INJECTION INTRAVENOUS at 02:08

## 2018-05-21 RX ADMIN — INSULIN LISPRO 3 UNITS: 100 INJECTION, SOLUTION INTRAVENOUS; SUBCUTANEOUS at 21:38

## 2018-05-21 NOTE — ED NOTES
TRANSFER - OUT REPORT:    Verbal report given to [facility], RN(name) on Pierre Stands  being transferred to Renal  3253(unit) for routine progression of care       Report consisted of patients Situation, Background, Assessment and   Recommendations(SBAR). Information from the following report(s) SBAR, Kardex, ED Summary, [facility] and Recent Results was reviewed with the receiving nurse. Lines:   Peripheral IV 05/20/18 Left Hand (Active)   Site Assessment Clean, dry, & intact 5/20/2018  7:15 PM   Phlebitis Assessment 0 5/20/2018  7:15 PM   Infiltration Assessment 0 5/20/2018  7:15 PM   Dressing Status Clean, dry, & intact 5/20/2018  7:15 PM   Hub Color/Line Status Pink;Capped;Flushed 5/20/2018  7:15 PM       Peripheral IV 05/20/18 Right Antecubital (Active)   Site Assessment Clean, dry, & intact 5/20/2018  8:40 PM   Phlebitis Assessment 0 5/20/2018  8:40 PM   Infiltration Assessment 0 5/20/2018  8:40 PM   Dressing Status Clean, dry, & intact 5/20/2018  8:40 PM   Hub Color/Line Status Green;Capped;Flushed 5/20/2018  8:40 PM        Opportunity for questions and clarification was provided.       Patient transported with:   [employer]

## 2018-05-21 NOTE — PROGRESS NOTES
Hospitalist Progress Note  Lily Jonas MD          Date of Service:  2018  NAME:  Fara Nolasco  :  1960  MRN:  [id number]      Reason for follow up:   pyelonephritis    Interval history / Subjective:     Pain slowly improving, request diet advance     Assessment & Plan:   Acute pyelonephritis, right side  : f/u on cultures. Cont ceftriaxone. Hyperglycemia  : check A1c. Add correction insulin. Liver lesion   : appreciate GI consultation. ? Biospy. HTN  : hctz held on admission. Cont prn labetalol. Obesity (BMI 35.83)      Code Status: full code  DVT prophylaxis: lovenox    Care Plan discussed with : pt and nurse. Disposition: home    Estimated date of d/c: 2-3 days    Total non critical care time spent: 35 min    Hospital Problems  Never Reviewed          Codes Class Noted POA    Pyelonephritis ICD-10-CM: N12  ICD-9-CM: 590.80  2018 Unknown                Review of Systems:   Review of Systems   Constitutional: Negative for chills and fever. Respiratory: Negative for cough and shortness of breath. Cardiovascular: Negative for chest pain and palpitations. Gastrointestinal: Positive for nausea. Negative for abdominal pain and vomiting. Genitourinary: Positive for flank pain. Vital Signs:    Last 24hrs VS reviewed since prior progress note. Most recent are:  Visit Vitals    /70 (BP 1 Location: Right arm, BP Patient Position: At rest)    Pulse 61    Temp 98.9 °F (37.2 °C)    Resp 20    Wt 100.7 kg (222 lb 0.1 oz)    SpO2 95%    BMI 35.83 kg/m2         Intake/Output Summary (Last 24 hours) at 18 1330  Last data filed at 18 1311   Gross per 24 hour   Intake           361.25 ml   Output              750 ml   Net          -388.75 ml        Physical Examination:   Physical Exam   Constitutional: She is oriented to person, place, and time. She has a sickly appearance.  No distress. Cardiovascular: Normal rate, regular rhythm and normal heart sounds. Pulmonary/Chest: Effort normal and breath sounds normal.   Abdominal: Soft. Bowel sounds are normal. She exhibits no distension. Musculoskeletal: Normal range of motion. She exhibits no edema or tenderness. Neurological: She is alert and oriented to person, place, and time. No cranial nerve deficit. Skin: Skin is warm and dry. She is not diaphoretic. Psychiatric: Mood and affect normal.   Nursing note and vitals reviewed. Data Review:    Review and/or order of clinical lab test      Labs:     Recent Labs      05/21/18 0221 05/20/18 1919   WBC  5.9  8.2   HGB  14.3  16.1*   HCT  42.6  47.3*   PLT  203  245     Recent Labs      05/21/18 0221 05/20/18 1919   NA  138  139   K  3.6  3.9   CL  106  106   CO2  25  28   BUN  11  13   CREA  0.65  0.74   GLU  308*  217*   CA  8.6  9.7     Recent Labs      05/21/18 0221 05/20/18 1919   SGOT  25  27   ALT  38  46   AP  80  90   TBILI  0.3  0.4   TP  6.4  7.5   ALB  2.9*  3.5   GLOB  3.5  4.0   LPSE   --   84     No results for input(s): INR, PTP, APTT in the last 72 hours. No lab exists for component: INREXT   No results for input(s): FE, TIBC, PSAT, FERR in the last 72 hours. No results found for: FOL, RBCF   No results for input(s): PH, PCO2, PO2 in the last 72 hours. No results for input(s): CPK, CKNDX, TROIQ in the last 72 hours.     No lab exists for component: CPKMB  No results found for: CHOL, CHOLX, CHLST, CHOLV, HDL, LDL, LDLC, DLDLP, TGLX, TRIGL, TRIGP, CHHD, CHHDX  No results found for: [facility]  Lab Results   Component Value Date/Time    Color YELLOW/STRAW 05/20/2018 07:19 PM    Appearance CLOUDY (A) 05/20/2018 07:19 PM    Specific gravity >1.030 (H) 05/20/2018 07:19 PM    Specific gravity 1.024 05/14/2017 10:01 PM    pH (UA) 5.5 05/20/2018 07:19 PM    Protein TRACE (A) 05/20/2018 07:19 PM    Glucose >1000 (A) 05/20/2018 07:19 PM    Ketone TRACE (A) 05/20/2018 07:19 PM    Bilirubin NEGATIVE  05/20/2018 07:19 PM    Urobilinogen 0.2 05/20/2018 07:19 PM    Nitrites NEGATIVE  05/20/2018 07:19 PM    Leukocyte Esterase LARGE (A) 05/20/2018 07:19 PM    Epithelial cells MANY (A) 05/20/2018 07:19 PM    Bacteria 2+ (A) 05/20/2018 07:19 PM    WBC >100 (H) 05/20/2018 07:19 PM    RBC 5-10 05/20/2018 07:19 PM         Medications Reviewed:     Current Facility-Administered Medications   Medication Dose Route Frequency    pantoprazole (PROTONIX) 40 mg in sodium chloride 0.9% 10 mL injection  40 mg IntraVENous DAILY    0.9% sodium chloride infusion  75 mL/hr IntraVENous CONTINUOUS    morphine injection 2 mg  2 mg IntraVENous Q3H PRN    cefTRIAXone (ROCEPHIN) 1 g in 0.9% sodium chloride (MBP/ADV) 50 mL  1 g IntraVENous Q24H    sodium chloride (NS) flush 5-10 mL  5-10 mL IntraVENous Q8H    sodium chloride (NS) flush 5-10 mL  5-10 mL IntraVENous PRN    acetaminophen (TYLENOL) tablet 650 mg  650 mg Oral Q6H PRN    oxyCODONE-acetaminophen (PERCOCET) 5-325 mg per tablet 1 Tab  1 Tab Oral Q6H PRN    naloxone (NARCAN) injection 0.4 mg  0.4 mg IntraVENous PRN    ondansetron (ZOFRAN) injection 4 mg  4 mg IntraVENous Q4H PRN    bisacodyl (DULCOLAX) suppository 10 mg  10 mg Rectal DAILY PRN    enoxaparin (LOVENOX) injection 40 mg  40 mg SubCUTAneous Q24H     ______________________________________________________________________               Kaylen Posadas MD

## 2018-05-21 NOTE — PROGRESS NOTES
[address], sister/emergency contact, brought \"difficult intubation\" form for patient. It has been placed in her patient bin/chart.

## 2018-05-21 NOTE — PROGRESS NOTES
Bedside and Verbal shift change report given to [facility] (oncoming nurse) by Ruthy Henao (offgoing nurse). Report included the following information SBAR, Kardex, Intake/Output, MAR, Accordion and Recent Results. PRN morphine and percocet given x2 this shift. Started ACHS POC blood glucose and insulin coverage. A1C drawn and sent to lab.

## 2018-05-21 NOTE — PROGRESS NOTES
PT consult received and chart reviewed; RN reported patient up without concern beyond pain. Consulted with patient who confirmed this, stating no concerns for mobility nor stability at this time with negative fall screen. Patient's spouse, and other family present to confirm. As no skilled PT indicated at this time, will sign off. Patient encouraged in length to continue to mobilize as able.       Calvin Acharya, PT, DPT, [facility]

## 2018-05-21 NOTE — PROGRESS NOTES
Bedside and Verbal shift change report given to Jennifer Márquez (oncoming nurse) by Loreto Lewis (offgoing nurse). Report included the following information SBAR, Kardex, Intake/Output, MAR and Recent Results. Zone Phone for oncoming shift:   0048    Shift Summary: Patient rested quietly throughout the night. C/O pain in lower back and ABD, PRN Morphine given.      LDAs               Peripheral IV 05/20/18 Left Hand (Active)   Site Assessment Clean, dry, & intact 5/21/2018  2:00 AM   Phlebitis Assessment 0 5/21/2018  2:00 AM   Infiltration Assessment 0 5/21/2018  2:00 AM   Dressing Status Clean, dry, & intact 5/21/2018  2:00 AM   Dressing Type Tape;Transparent 5/21/2018  2:00 AM   Hub Color/Line Status Pink;Flushed 5/21/2018  2:00 AM       Peripheral IV 05/20/18 Right Antecubital (Active)   Site Assessment Clean, dry, & intact 5/21/2018  2:00 AM   Phlebitis Assessment 0 5/21/2018  2:00 AM   Infiltration Assessment 0 5/21/2018  2:00 AM   Dressing Status Clean, dry, & intact 5/21/2018  2:00 AM   Dressing Type Tape;Transparent 5/21/2018  2:00 AM   Hub Color/Line Status Green;Flushed 5/21/2018  2:00 AM                        Intake & Output     Last Bowel Movement Last Bowel Movement Date: 05/18/18   Glucose Checks [x] N/A  [] AC/HS  [] Q6  Concerns:   Nutrition Active Orders   Diet    DIET CLEAR LIQUID       Consults []PT  []OT  []Speech  []Case Management   Cardiac Monitoring [x]N/A []Yes Expires:

## 2018-05-21 NOTE — PROGRESS NOTES
Reason for Admission:   Pyelonephritis                 RRAT Score:          9           Plan for utilizing home health:      No needs                    Likelihood of Readmission:  low                         Transition of Care Plan:                 Pt admitted with pyelonephritis. Pt lives with disabled spouse, Brenda Martino and 14 yo son. She has 6 children; has [employer] aid, full code, morbidly obese, independent with adl, no dme, and no PCP listed-will clarify. Looks like she saw [facility] in 2016. I spoke to sister and pt regarding d/c planning. She has no d/c needs at present, family will transport home when stable, and she goes to [facility] on [address]. Will await PT recommendations. 1400  PT has no d/c recommendations at present time. IDR with pt. She states she had seen [employer] but changed to one of his partners she has seen only once in that office. She also may change to [facility]. Care Management Interventions  PCP Verified by CM:  Yes  Mode of Transport at Discharge: Self  Transition of Care Consult (CM Consult): Discharge Planning  MyChart Signup: No  Discharge Durable Medical Equipment: No  Health Maintenance Reviewed: Yes  Physical Therapy Consult: Yes  Occupational Therapy Consult: No  Speech Therapy Consult: No  Current Support Network: Lives with Spouse  Confirm Follow Up Transport: Family  Plan discussed with Pt/Family/Caregiver: Yes  Freedom of Choice Offered: Yes  Discharge Location  Discharge Placement: Home

## 2018-05-21 NOTE — CONSULTS
[address]  MR#: [id number]  : 1960  ACCOUNT #: [de-identified]   DATE OF SERVICE: 2018    REASON FOR CONSULTATION:  Liver lesion, abdominal pain. REQUESTING PHYSICIAN:  Dr. Peter Rome:  The patient is a 81-year-old woman who began with crampy pain in the lower abdomen and periumbilical region on Tuesday. She then developed pain in the right flank, which has now moved more to the mid abdomen. Felt worse when she was moving around or in the car, hitting bumps. She had had dysuria, but no hematuria. There had been initially no nausea or vomiting. Stools were generally normal.  No hematochezia or melena was noted. She was seen in the Emergency Department on 05/15. CT scan showed hepatic steatosis and an 18 mm indeterminate hypodense lesion in the right lobe of the liver. Gallstones were noted. Small bilateral renal cysts. She was discharged from the Emergency Room, told to follow up with her primary care physician, who began her on NSAIDs and a muscle relaxant. She then developed nausea and vomiting. The pain intensified and she came back to the Emergency Department last evening and was admitted for further evaluation. Subsequent urinalysis from the  is showing greater than 100,000 colonies of Klebsiella pneumoniae. The patient has had known gallstones since  by CT scan. Liver lesion was not noticed on CT scans in  or , which have been reviewed. She has never had screening colonoscopy. She denies a history of an ulcer, has never had upper endoscopy. PAST MEDICAL HISTORY:  Includes possible hypertension, nephrolithiasis, ovarian cyst.    SOCIAL HISTORY:  She is a current every day smoker, does not drink alcohol. Has 6 healthy children. ALLERGIES:  NONE KNOWN. FAMILY HISTORY:  Grandparent with colon cancer. No first-degree relatives.     MEDICATIONS:  Prior to admission, hydrochlorothiazide 25 mg daily. REVIEW OF SYSTEMS:  Otherwise, negative. PHYSICAL EXAMINATION:  GENERAL:  Shows her to be an obese, pleasant woman. VITAL SIGNS:  Blood pressure 143/73, pulse 65, respirations 18. She is afebrile. SKIN:  There is no rash or jaundice. HEENT:  Sclerae are anicteric. NECK:  Supple, without JVD. LUNGS:  Clear to auscultation. CARDIAC:  Regular rate and rhythm. ABDOMEN:  Obese, soft. No palpable masses, tenderness or hepatosplenomegaly. RECTAL:  Not repeated. LABORATORY STUDIES:  WBC 5.9, hemoglobin 14.3, hematocrit 42.6, platelet count 480,156. Serum chemistries:  Glucose elevated at 300, otherwise unremarkable. Liver function tests are normal.  Lipase is normal.      CT scan of abdomen shows a hypo-enhancing 6 mm hepatic mass at the junction of segments 5 and 6, which remains hypodense up to the liver on all post-contrast phases. This remains indeterminate, though solitary metastasis is favored. Gallstones again noted. ASSESSMENT:  1. Urinary tract infection. 2.  Probable right-sided pyelonephritis. 3.  Asymptomatic gallstones. 4.  Indeterminate liver lesion, worrisome for solitary metastasis. ASSESSMENT AND PLAN:    1. Urinary tract infection is being treated. 2.  I will review the CT scans with the radiologist with the thought of eventually ordering a biopsy of this liver lesion. 3.  Colonoscopy and upper endoscopy should be performed at some point, too, but not during the treatment of the active infection. 4.  Will advance diet.       Liu Jacobo.MD [facility] / FAITH  D: 05/21/2018 11:03     T: 05/21/2018 11:35  JOB #: [id number]

## 2018-05-21 NOTE — H&P
Hospitalist Admission Note    NAME:  Eva Oconnell   :   1960   MRN:   [id number]     Date of admit: 2018    PCP: None    Assessment/Plan:     ? Acute right pyelonephritis (2018) POA  Right flank pain POA etiology unclear  Nausea and vomiting today POA  Right back and abdominal cramping x 1 week, dysuria x 2 months  Some CVA tenderness on right  UA 5 days ago normal, now with pyuria, increased squames, bacteria  Presumptive diagnosis of pyelonephritis, but not firm   No fever or leukocytosis, kidney not inflamed on CT scan  ? Musculoskeletal, ? Symptomatic gallstone(clinically less likely)  No evidence of zoster  IVF  IV ceftriaxone till urine culture back  Urine and blood cultures  Ask GI to see, ? Intraabdominal source of the pain  IV PPI  PRN pain and nausea meds    Liver lesion etiology unclear POA  1.6 cm, suggestive of met per CT report  No other lesions on CT scan  No prior colonoscopy, some change in stools past few weeks  Ask GI to see, likely will need outpatient evaluation and colonoscopy    Essential HTN POA  Hold HCTZ  PRN labetalol    Cholelithiasis POA seems incidental for now    Obesity POA body mass index is 35.83 kg/(m^2)    Given the patient's current clinical presentation, I have a high level of concern for decompensation if discharged from the emergency department. My assessment of this patient's clinical condition and my plan of care is as noted above. DVT prophylaxis with lovenox    Code status: full code  NOK:    History     CHIEF COMPLAINT: I have had cramping in my right side for a week and was throwing up today    HISTORY OF PRESENT ILLNESS:  63 Y. O. WF  1 week ago, onset of a moderate to severe right flank cramping pain, also periumbilical  Worse with moving or going over bumps in car  Does not feel like her prior kidney stones or prior musculoskeletal pain  Dysuria going on for months  No fevers or chills  Stools loose and thin caliper x weeks, no blood or melena  Seen in ED 5/15/2018 for the pain, ? N/V   CT scan with gallstone, 1.6 cm liver lesion, normal kidneys   UA negative   HCG negative  PO pain meds, referred to PCP  Changed to PO Motrin  Pain not improved, today develoepd N/V x 3 without blood or coffee grounds  Came back to ED    ED afebrile, WBC 7.5  Right CVA tenderness, mild abdominal tenderness  Normal LFTs and lipase  UA now with pyuria, bacteremia, increased squames  CT scan with non inflamed kidneys, no obstruction, gallstone present   Liver lesion 1.6 cm read as possible met  ? Pyelonephritis, IV ceftriaxone  We were called to admit the patient      Past medical history:  1. Nephrolithiasis  2. Ovarian cysts  3. Possible HTN    Past Surgical History:   Procedure Laterality Date    HX GYN         Social History   Substance Use Topics    Smoking status: Current Every Day Smoker     Packs/day: 0.25    Smokeless tobacco: Never Used    Alcohol use No        Family history:  6 children healthy  Mom ESRD, nephrolithiasis  Dad CAD    No Known Allergies     Prior to Admission medications    Medication Sig Start Date End Date Taking? Authorizing Provider   hydroCHLOROthiazide (HYDRODIURIL) 25 mg tablet Take 25 mg by mouth daily.    Yes Phys Other, MD       Review of symptoms:     POSITIVE= Bold  Negative = not bold  General:  fever, chills, sweats  Eyes:    blurred vision, eye pain, double vision  ENT:    Coryza, sore throat, trouble swallowing  Respiratory:   cough, sputum, SOB  Cardiology:   chest pain, orthopnea, PND, edema  Gastrointestinal:  abdominal pain periumbilical and Right flank , N/V, diarrhea, constipation, melena or BRBPR  Genitourinary:  Urgency, dysuria, hematuria  Muskuloskeletal :  Joint redness, swelling or acute joint pain, myalgias  Hematology:  easy bruising, nose or gum bleeding  Dermatological: rash, ulceration  Endocrine:   Polyuria or polydipsia, heat or hold intolerance  Neurological:  Mild headache, focal motor or sensory changes     Speech difficulties, memory loss  Psychological: depression, agitation      Objective:   VITALS:    Patient Vitals for the past 24 hrs:   Temp Pulse Resp BP SpO2   18 0028 97.9 °F (36.6 °C) 83 18 148/75 95 %   18 1717 98.7 °F (37.1 °C) (!) 107 18 165/90 100 %     Temp (24hrs), Av.3 °F (36.8 °C), Min:97.9 °F (36.6 °C), Max:98.7 °F (37.1 °C)      O2 Device: Room air    PHYSICAL EXAM:   General:    Alert, cooperative in no distress     HEENT: Normocephalic, atraumatic    PERRL, EOMI  Sclera no icterus    Nasal mucosa without masses or discharge  Hearing intact to voice    Oropharynx without erythema or exudate  No thrush  Pink MM  Neck:  No meningismus, trachea midline, no carotid bruits     Thyroid not enlarged, no nodules or tenderness  Lungs:   Clear to auscultation bilaterally. No wheezing or rales    No accessory muscle use or retractions. Heart:   Regular rate and rhythm,  no murmur or gallop. No LE edema  Abdomen:   Soft, mildly tender in epigastric area. Not distended. Bowel sounds normal.     No masses, No Hepatosplenomegaly, No Rebound or guarding    Positive right CVA tenderness  Lymph nodes: No cervical or inguinal DANIA  Musculoskeletal:  No Joint swelling, erythema, warmth.  No Cyanosis or clubbing    No spine point tenderness  Skin:      No rashes    Not Jaundiced   No nodules or thickening  Neurologic: Alert and oriented X 3, follows commands     Cranial nerves 2 to 12 intact    Symmetric motor strength bilaterally       LAB DATA REVIEWED:    Recent Results (from the past 12 hour(s))   CBC WITH AUTOMATED DIFF    Collection Time: 18  5:23 PM   Result Value Ref Range    WBC 7.5 3.6 - 11.0 K/uL    RBC 5.40 (H) 3.80 - 5.20 M/uL    HGB 16.1 (H) 11.5 - 16.0 g/dL    HCT 48.0 (H) 35.0 - 47.0 %    MCV 88.9 80.0 - 99.0 FL    MCH 29.8 26.0 - 34.0 PG    MCHC 33.5 30.0 - 36.5 g/dL    RDW 13.2 11.5 - 14.5 %    PLATELET 016 883 - 488 K/uL    MPV 10.6 8.9 - 12.9 FL    NRBC 0.0 0 PER 100 WBC    ABSOLUTE NRBC 0.00 0.00 - 0.01 K/uL    NEUTROPHILS 50 32 - 75 %    LYMPHOCYTES 40 12 - 49 %    MONOCYTES 7 5 - 13 %    EOSINOPHILS 2 0 - 7 %    BASOPHILS 1 0 - 1 %    IMMATURE GRANULOCYTES 0 0.0 - 0.5 %    ABS. NEUTROPHILS 3.8 1.8 - 8.0 K/UL    ABS. LYMPHOCYTES 3.0 0.8 - 3.5 K/UL    ABS. MONOCYTES 0.5 0.0 - 1.0 K/UL    ABS. EOSINOPHILS 0.2 0.0 - 0.4 K/UL    ABS. BASOPHILS 0.0 0.0 - 0.1 K/UL    ABS. IMM. GRANS. 0.0 0.00 - 0.04 K/UL    DF AUTOMATED     URINALYSIS W/ REFLEX CULTURE    Collection Time: 05/20/18  7:19 PM   Result Value Ref Range    Color YELLOW/STRAW      Appearance CLOUDY (A) CLEAR      Specific gravity >1.030 (H) 1.003 - 1.030    pH (UA) 5.5 5.0 - 8.0      Protein TRACE (A) NEG mg/dL    Glucose >1000 (A) NEG mg/dL    Ketone TRACE (A) NEG mg/dL    Bilirubin NEGATIVE  NEG      Blood SMALL (A) NEG      Urobilinogen 0.2 0.2 - 1.0 EU/dL    Nitrites NEGATIVE  NEG      Leukocyte Esterase LARGE (A) NEG      UA:UC IF INDICATED URINE CULTURE ORDERED (A) CNI      WBC >100 (H) 0 - 4 /hpf    RBC 5-10 0 - 5 /hpf    Epithelial cells MANY (A) FEW /lpf    Bacteria 2+ (A) NEG /hpf    Hyaline cast 2-5 0 - 5 /lpf   METABOLIC PANEL, COMPREHENSIVE    Collection Time: 05/20/18  7:19 PM   Result Value Ref Range    Sodium 139 136 - 145 mmol/L    Potassium 3.9 3.5 - 5.1 mmol/L    Chloride 106 97 - 108 mmol/L    CO2 28 21 - 32 mmol/L    Anion gap 5 5 - 15 mmol/L    Glucose 217 (H) 65 - 100 mg/dL    BUN 13 6 - 20 MG/DL    Creatinine 0.74 0.55 - 1.02 MG/DL    BUN/Creatinine ratio 18 12 - 20      GFR est AA >60 >60 ml/min/1.73m2    GFR est non-AA >60 >60 ml/min/1.73m2    Calcium 9.7 8.5 - 10.1 MG/DL    Bilirubin, total 0.4 0.2 - 1.0 MG/DL    ALT (SGPT) 46 12 - 78 U/L    AST (SGOT) 27 15 - 37 U/L    Alk.  phosphatase 90 45 - 117 U/L    Protein, total 7.5 6.4 - 8.2 g/dL    Albumin 3.5 3.5 - 5.0 g/dL    Globulin 4.0 2.0 - 4.0 g/dL    A-G Ratio 0.9 (L) 1.1 - 2.2     CBC WITH AUTOMATED DIFF    Collection Time: 05/20/18  7:19 PM   Result Value Ref Range    WBC 8.2 3.6 - 11.0 K/uL    RBC 5.37 (H) 3.80 - 5.20 M/uL    HGB 16.1 (H) 11.5 - 16.0 g/dL    HCT 47.3 (H) 35.0 - 47.0 %    MCV 88.1 80.0 - 99.0 FL    MCH 30.0 26.0 - 34.0 PG    MCHC 34.0 30.0 - 36.5 g/dL    RDW 13.2 11.5 - 14.5 %    PLATELET 227 507 - 423 K/uL    MPV 10.7 8.9 - 12.9 FL    NRBC 0.0 0  WBC    ABSOLUTE NRBC 0.00 0.00 - 0.01 K/uL    NEUTROPHILS 49 32 - 75 %    LYMPHOCYTES 40 12 - 49 %    MONOCYTES 8 5 - 13 %    EOSINOPHILS 2 0 - 7 %    BASOPHILS 0 0 - 1 %    IMMATURE GRANULOCYTES 0 0.0 - 0.5 %    ABS. NEUTROPHILS 4.0 1.8 - 8.0 K/UL    ABS. LYMPHOCYTES 3.3 0.8 - 3.5 K/UL    ABS. MONOCYTES 0.6 0.0 - 1.0 K/UL    ABS. EOSINOPHILS 0.2 0.0 - 0.4 K/UL    ABS. BASOPHILS 0.0 0.0 - 0.1 K/UL    ABS. IMM. GRANS. 0.0 0.00 - 0.04 K/UL    DF AUTOMATED     LIPASE    Collection Time: 05/20/18  7:19 PM   Result Value Ref Range    Lipase 84 73 - 393 U/L       CXR 5/14/2018 read by radiology and reviewed by myself shows FINDINGS:   Single AP portable view of the chest obtained at 2053 demonstrates a  stable cardiomediastinal silhouette. There is pulmonary vascular congestion  without overt edema. There is no focal airspace disease. No osseous  abnormalities are seen. IMPRESSION: ? Pulmonary vascular congestion. CT scan abdomen/pelvis FINDINGS:  Liver: A hypoenhancing, 16 mm, hepatic mass at the junction of segments 5 and 6  remains hypodense to liver on all postcontrast phases. This remains  indeterminate, though a solitary metastasis is favored. It is amenable to  percutaneous biopsy. On unenhanced images, the liver is denser than the spleen,  this is inconsistent with hepatic steatosis (as previously reported). Abdomen: An accessory right lower pole renal artery is a normal variant. Incidental note is made of a gallstone.  Incidental note is made of a right renal  cyst and subcentimeter hypodense bilateral renal lesions which are too small to  characterize, but likely also represent cysts. The lung bases are clear. The  heart size is normal. There is a tiny hiatal hernia. The stomach, duodenum,  pancreas, spleen, and adrenals are normal.  Pelvis: The small bowel, ileocecal junction, appendix, colon, and bladder are  normal. No free air or fluid, and no abdominopelvic lymphadenopathy. IMPRESSION:  1. A hypoenhancing hepatic mass remains indeterminate. A solitary metastasis is  favored. It is amenable to percutaneous biopsy. 2. No hepatic steatosis. I saw the patient personally, took a history and did a complete physical exam at the bedside. I performed complex decision making in coming up with a diagnostic and treatment plan for the patient. I reviewed the patient's past medical records, current laboratory and radiology results, and actual Xray films/EKG. I have also discussed this case with the involved ED physician.     Care Plan discussed with:    Patient, , ED Doc    Risk of deterioration:  High    Total Time Coordinating Admission:   60  minutes    Total Critical Care Time:         Chrissy Pisano MD

## 2018-05-22 ENCOUNTER — APPOINTMENT (OUTPATIENT)
Dept: GENERAL RADIOLOGY | Age: 58
DRG: 463 | End: 2018-05-22
Attending: EMERGENCY MEDICINE
Payer: MEDICAID

## 2018-05-22 LAB
ANION GAP SERPL CALC-SCNC: 8 MMOL/L (ref 5–15)
BACTERIA SPEC CULT: NORMAL
BASOPHILS # BLD: 0 K/UL (ref 0–0.1)
BASOPHILS NFR BLD: 1 % (ref 0–1)
BUN SERPL-MCNC: 8 MG/DL (ref 6–20)
BUN/CREAT SERPL: 14 (ref 12–20)
CALCIUM SERPL-MCNC: 8.7 MG/DL (ref 8.5–10.1)
CC UR VC: NORMAL
CEA SERPL-MCNC: 4.6 NG/ML
CHLORIDE SERPL-SCNC: 105 MMOL/L (ref 97–108)
CO2 SERPL-SCNC: 24 MMOL/L (ref 21–32)
CREAT SERPL-MCNC: 0.57 MG/DL (ref 0.55–1.02)
DIFFERENTIAL METHOD BLD: NORMAL
EOSINOPHIL # BLD: 0.1 K/UL (ref 0–0.4)
EOSINOPHIL NFR BLD: 3 % (ref 0–7)
ERYTHROCYTE [DISTWIDTH] IN BLOOD BY AUTOMATED COUNT: 13.2 % (ref 11.5–14.5)
GLUCOSE BLD STRIP.AUTO-MCNC: 166 MG/DL (ref 65–100)
GLUCOSE BLD STRIP.AUTO-MCNC: 175 MG/DL (ref 65–100)
GLUCOSE BLD STRIP.AUTO-MCNC: 178 MG/DL (ref 65–100)
GLUCOSE BLD STRIP.AUTO-MCNC: 212 MG/DL (ref 65–100)
GLUCOSE SERPL-MCNC: 175 MG/DL (ref 65–100)
HCT VFR BLD AUTO: 44 % (ref 35–47)
HGB BLD-MCNC: 14.9 G/DL (ref 11.5–16)
IMM GRANULOCYTES # BLD: 0 K/UL (ref 0–0.04)
IMM GRANULOCYTES NFR BLD AUTO: 0 % (ref 0–0.5)
LYMPHOCYTES # BLD: 2.1 K/UL (ref 0.8–3.5)
LYMPHOCYTES NFR BLD: 40 % (ref 12–49)
MCH RBC QN AUTO: 30.2 PG (ref 26–34)
MCHC RBC AUTO-ENTMCNC: 33.9 G/DL (ref 30–36.5)
MCV RBC AUTO: 89.2 FL (ref 80–99)
MONOCYTES # BLD: 0.4 K/UL (ref 0–1)
MONOCYTES NFR BLD: 8 % (ref 5–13)
NEUTS SEG # BLD: 2.6 K/UL (ref 1.8–8)
NEUTS SEG NFR BLD: 48 % (ref 32–75)
NRBC # BLD: 0 K/UL (ref 0–0.01)
NRBC BLD-RTO: 0 PER 100 WBC
PLATELET # BLD AUTO: 210 K/UL (ref 150–400)
PMV BLD AUTO: 10.7 FL (ref 8.9–12.9)
POTASSIUM SERPL-SCNC: 4.5 MMOL/L (ref 3.5–5.1)
RBC # BLD AUTO: 4.93 M/UL (ref 3.8–5.2)
SERVICE CMNT-IMP: ABNORMAL
SERVICE CMNT-IMP: NORMAL
SODIUM SERPL-SCNC: 137 MMOL/L (ref 136–145)
WBC # BLD AUTO: 5.3 K/UL (ref 3.6–11)

## 2018-05-22 PROCEDURE — 36415 COLL VENOUS BLD VENIPUNCTURE: CPT

## 2018-05-22 PROCEDURE — 74011250636 HC RX REV CODE- 250/636: Performed by: HOSPITALIST

## 2018-05-22 PROCEDURE — 82962 GLUCOSE BLOOD TEST: CPT

## 2018-05-22 PROCEDURE — 82378 CARCINOEMBRYONIC ANTIGEN: CPT | Performed by: EMERGENCY MEDICINE

## 2018-05-22 PROCEDURE — 80048 BASIC METABOLIC PNL TOTAL CA: CPT

## 2018-05-22 PROCEDURE — C9113 INJ PANTOPRAZOLE SODIUM, VIA: HCPCS | Performed by: INTERNAL MEDICINE

## 2018-05-22 PROCEDURE — 77030027138 HC INCENT SPIROMETER -A

## 2018-05-22 PROCEDURE — 74011636637 HC RX REV CODE- 636/637: Performed by: HOSPITALIST

## 2018-05-22 PROCEDURE — 74011250637 HC RX REV CODE- 250/637: Performed by: EMERGENCY MEDICINE

## 2018-05-22 PROCEDURE — 86301 IMMUNOASSAY TUMOR CA 19-9: CPT | Performed by: INTERNAL MEDICINE

## 2018-05-22 PROCEDURE — 74011636637 HC RX REV CODE- 636/637: Performed by: EMERGENCY MEDICINE

## 2018-05-22 PROCEDURE — 74011250636 HC RX REV CODE- 250/636: Performed by: INTERNAL MEDICINE

## 2018-05-22 PROCEDURE — 82105 ALPHA-FETOPROTEIN SERUM: CPT | Performed by: INTERNAL MEDICINE

## 2018-05-22 PROCEDURE — 74011000258 HC RX REV CODE- 258: Performed by: HOSPITALIST

## 2018-05-22 PROCEDURE — 85025 COMPLETE CBC W/AUTO DIFF WBC: CPT

## 2018-05-22 PROCEDURE — 74011250637 HC RX REV CODE- 250/637: Performed by: INTERNAL MEDICINE

## 2018-05-22 PROCEDURE — 65270000029 HC RM PRIVATE

## 2018-05-22 PROCEDURE — 74011000250 HC RX REV CODE- 250: Performed by: INTERNAL MEDICINE

## 2018-05-22 PROCEDURE — 71046 X-RAY EXAM CHEST 2 VIEWS: CPT

## 2018-05-22 RX ORDER — INSULIN GLARGINE 100 [IU]/ML
10 INJECTION, SOLUTION SUBCUTANEOUS
Status: DISCONTINUED | OUTPATIENT
Start: 2018-05-22 | End: 2018-05-23

## 2018-05-22 RX ORDER — MICONAZOLE NITRATE 2 %
1 CREAM WITH APPLICATOR VAGINAL
Status: DISCONTINUED | OUTPATIENT
Start: 2018-05-22 | End: 2018-05-24 | Stop reason: HOSPADM

## 2018-05-22 RX ORDER — OXYCODONE AND ACETAMINOPHEN 5; 325 MG/1; MG/1
2 TABLET ORAL
Status: DISCONTINUED | OUTPATIENT
Start: 2018-05-22 | End: 2018-05-24 | Stop reason: HOSPADM

## 2018-05-22 RX ORDER — HYDROCORTISONE 1 %
CREAM (GRAM) TOPICAL 2 TIMES DAILY
Status: DISCONTINUED | OUTPATIENT
Start: 2018-05-22 | End: 2018-05-24 | Stop reason: HOSPADM

## 2018-05-22 RX ORDER — PANTOPRAZOLE SODIUM 40 MG/1
40 TABLET, DELAYED RELEASE ORAL
Status: DISCONTINUED | OUTPATIENT
Start: 2018-05-22 | End: 2018-05-24 | Stop reason: HOSPADM

## 2018-05-22 RX ORDER — BISACODYL 5 MG
10 TABLET, DELAYED RELEASE (ENTERIC COATED) ORAL
Status: COMPLETED | OUTPATIENT
Start: 2018-05-22 | End: 2018-05-22

## 2018-05-22 RX ORDER — IPRATROPIUM BROMIDE AND ALBUTEROL SULFATE 2.5; .5 MG/3ML; MG/3ML
3 SOLUTION RESPIRATORY (INHALATION)
Status: DISCONTINUED | OUTPATIENT
Start: 2018-05-22 | End: 2018-05-24 | Stop reason: HOSPADM

## 2018-05-22 RX ORDER — FLUCONAZOLE 100 MG/1
150 TABLET ORAL DAILY
Status: DISCONTINUED | OUTPATIENT
Start: 2018-05-22 | End: 2018-05-24 | Stop reason: HOSPADM

## 2018-05-22 RX ADMIN — OXYCODONE HYDROCHLORIDE AND ACETAMINOPHEN 1 TABLET: 5; 325 TABLET ORAL at 07:35

## 2018-05-22 RX ADMIN — INSULIN LISPRO 2 UNITS: 100 INJECTION, SOLUTION INTRAVENOUS; SUBCUTANEOUS at 21:22

## 2018-05-22 RX ADMIN — OXYCODONE HYDROCHLORIDE AND ACETAMINOPHEN 1 TABLET: 5; 325 TABLET ORAL at 13:46

## 2018-05-22 RX ADMIN — CEFTRIAXONE 1 G: 1 INJECTION, POWDER, FOR SOLUTION INTRAMUSCULAR; INTRAVENOUS at 21:20

## 2018-05-22 RX ADMIN — MORPHINE SULFATE 2 MG: 4 INJECTION INTRAVENOUS at 06:58

## 2018-05-22 RX ADMIN — INSULIN LISPRO 2 UNITS: 100 INJECTION, SOLUTION INTRAVENOUS; SUBCUTANEOUS at 08:37

## 2018-05-22 RX ADMIN — BISACODYL 10 MG: 5 TABLET, COATED ORAL at 09:07

## 2018-05-22 RX ADMIN — OXYCODONE HYDROCHLORIDE AND ACETAMINOPHEN 1 TABLET: 5; 325 TABLET ORAL at 16:37

## 2018-05-22 RX ADMIN — SODIUM CHLORIDE 40 MG: 9 INJECTION INTRAMUSCULAR; INTRAVENOUS; SUBCUTANEOUS at 08:38

## 2018-05-22 RX ADMIN — INSULIN LISPRO 2 UNITS: 100 INJECTION, SOLUTION INTRAVENOUS; SUBCUTANEOUS at 11:58

## 2018-05-22 RX ADMIN — FLUCONAZOLE 150 MG: 100 TABLET ORAL at 09:08

## 2018-05-22 RX ADMIN — MICONAZOLE NITRATE 1 APPLICATOR: 20 CREAM VAGINAL at 21:34

## 2018-05-22 RX ADMIN — Medication 10 ML: at 06:02

## 2018-05-22 RX ADMIN — ENOXAPARIN SODIUM 40 MG: 40 INJECTION SUBCUTANEOUS at 08:39

## 2018-05-22 RX ADMIN — Medication 10 ML: at 21:20

## 2018-05-22 RX ADMIN — Medication 10 ML: at 14:00

## 2018-05-22 RX ADMIN — Medication: at 09:55

## 2018-05-22 RX ADMIN — INSULIN LISPRO 2 UNITS: 100 INJECTION, SOLUTION INTRAVENOUS; SUBCUTANEOUS at 17:13

## 2018-05-22 RX ADMIN — INSULIN GLARGINE 10 UNITS: 100 INJECTION, SOLUTION SUBCUTANEOUS at 21:24

## 2018-05-22 RX ADMIN — Medication: at 17:32

## 2018-05-22 NOTE — PROGRESS NOTES
GI PROGRESS NOTE    NAME:             Angeline Reyes   :              1960   MRN:              [id number]   Admit Date:     2018  Todays Date:  2018      Subjective:           Feels better but still with significant back pain. No abd pain. Taking food without difficulty. Medications-reviewed     Current Facility-Administered Medications   Medication Dose Route Frequency    insulin glargine (LANTUS) injection 10 Units  10 Units SubCUTAneous QHS    fluconazole (DIFLUCAN) tablet 150 mg  150 mg Oral DAILY    hydrocortisone (CORTAID) 1 % cream   Topical BID    pantoprazole (PROTONIX) tablet 40 mg  40 mg Oral ACB    albuterol-ipratropium (DUO-NEB) 2.5 MG-0.5 MG/3 ML  3 mL Nebulization Q6H PRN    miconazole (MICOTIN) 2 % vaginal cream 1 Applicator  1 Applicator Vaginal QHS    oxyCODONE-acetaminophen (PERCOCET) 5-325 mg per tablet 2 Tab  2 Tab Oral Q6H PRN    cefTRIAXone (ROCEPHIN) 1 g in 0.9% sodium chloride (MBP/ADV) 50 mL  1 g IntraVENous Q24H    glucose chewable tablet 16 g  4 Tab Oral PRN    dextrose (D50W) injection syrg 12.5-25 g  12.5-25 g IntraVENous PRN    glucagon (GLUCAGEN) injection 1 mg  1 mg IntraMUSCular PRN    insulin lispro (HUMALOG) injection   SubCUTAneous AC&HS    sodium chloride (NS) flush 5-10 mL  5-10 mL IntraVENous Q8H    sodium chloride (NS) flush 5-10 mL  5-10 mL IntraVENous PRN    acetaminophen (TYLENOL) tablet 650 mg  650 mg Oral Q6H PRN    naloxone (NARCAN) injection 0.4 mg  0.4 mg IntraVENous PRN    ondansetron (ZOFRAN) injection 4 mg  4 mg IntraVENous Q4H PRN    bisacodyl (DULCOLAX) suppository 10 mg  10 mg Rectal DAILY PRN    enoxaparin (LOVENOX) injection 40 mg  40 mg SubCUTAneous Q24H        Objective:   Patient Vitals for the past 8 hrs:   BP Temp Pulse Resp SpO2   18 1445 146/72 97.9 °F (36.6 °C) 62 18 93 %         1901 -  0700  In: 601.3 [P.O.:240;  I.V.:361.3]  Out: 750 [Urine:750]    EXAM: NEURO-a&o   HEENT-wnl   LUNGS-clear   COR-regular rate and rhythym   ABD-soft , no tenderness, no rebound, good bs        LABS:  Recent Labs      05/22/18   0652  05/21/18 0221   WBC  5.3  5.9   HGB  14.9  14.3   HCT  44.0  42.6   PLT  210  203     Recent Labs      05/22/18   0554  05/21/18 0221 05/20/18 1919   NA  137  138  139   K  4.5  3.6  3.9   CL  105  106  106   CO2  24  25  28   BUN  8  11  13   CREA  0.57  0.65  0.74   GLU  175*  308*  217*   CA  8.7  8.6  9.7     Recent Labs      05/21/18 0221 05/20/18 1919   SGOT  25  27   AP  80  90   TBILI  0.3  0.4   TP  6.4  7.5   ALB  2.9*  3.5   GLOB  3.5  4.0   LPSE   --   84   ALT  38  46                        Assessment:     1. Back pain---presumed secondary to pyelonephritis  2. Probable right-sided pyelonephritis. 3.  Asymptomatic gallstones. 4.  Indeterminate liver lesion, worrisome for solitary metastasis.              Active Problems:    Pyelonephritis (5/20/2018)        Plan:     1. Urinary tract infection is being treated. 2.  If Dr. Du Salvage ok with it, then will have radiology proceed with biopsy of liver lesion Wednesday  3. Colonoscopy and upper endoscopy should be performed at some point, too, but will proceed with liver biopsy first  4.  Will order AFP, CEA,

## 2018-05-22 NOTE — PROGRESS NOTES
1500-Bedside shift change report given to Seda Koehler (oncoming nurse) by Heron Ortiz  (offgoing nurse). Report included the following information SBAR, Kardex and MAR. 1900-Bedside shift change report given to Reta (oncoming nurse) by Seda Koehler (offgoing nurse). Report included the following information SBAR, Kardex and MAR.

## 2018-05-22 NOTE — DIABETES MGMT
DTC Consult Note      Current hospital DM medication: Lantus 10 units (starting tonight)    Consult received for:  []             Assessment of home management                [x]      Medication Recommendations                []             Meter/monitoring     [x]             Insulin instruction     [x]             New diagnosis     []             Outpatient education     []             Insulin pump patient     []             Insulin infusion     []             DKA/HHS    Chart reviewed and initial evaluation complete on Ansley Mujica. Patient is a 63 y.o. female with  newly diagnosed DM. Pt shared that she drinks 3 bottles of regular soda daily. Assessed and instructed patient on the following:   ·  blood sugar goals, hypoglycemia prevention and treatment, illness management - discussed importance in following up with PCP after discharge regarding DM medication management, nutrition - discussed using MyPlate method at meals , balance at meals, avoiding consuming regular coca[employer]a, referred to Diabetes Educator and site rotation    Encouraged the following:   · dietary modifications: Use MyPlate method at meals ,, avoid sugary beverages,  regular blood sugar monitorin-3x daily (discussed checking BG before meals)      Provided patient with the following: [x]             Survival skills education materials               [x]             Insulin education materials               []             CHO counting education materials               []             Outpatient DTC contact number               []             Glucometer               Patient was able to give return demonstration of    [x]       Glucometer    [x]       saline injection      with []     without [x]       assistance needed. [x]       Nurse to have patient self inject prior to discharge. Discussed with patient and/or family need for follow up appointment for diabetes management after discharge.      Pt scheduled for Survival Skills class on 6/8/18 at 1:00pm.      A1c:   Lab Results   Component Value Date/Time    Hemoglobin A1c 10.9 (H) 05/21/2018 02:10 PM       Recent Glucose Results: Lab Results   Component Value Date/Time     (H) 05/22/2018 05:54 AM    GLUCPOC 175 (H) 05/22/2018 11:30 AM    GLUCPOC 178 (H) 05/22/2018 07:42 AM    GLUCPOC 261 (H) 05/21/2018 08:45 PM        Lab Results   Component Value Date/Time    Creatinine 0.57 05/22/2018 05:54 AM     Estimated Creatinine Clearance: 130.5 mL/min (based on Cr of 0.57). Active Orders   Diet    DIET DIABETIC CONSISTENT CARB Regular        PO intake: Patient Vitals for the past 72 hrs:   % Diet Eaten   05/21/18 1809 100 %       Will continue to follow as needed. Thank you.       Tianna Martins, [address]    Office: [phone]

## 2018-05-22 NOTE — PROGRESS NOTES
Hospitalist Progress Note    NAME: Luigi Ellison   :  1960   MRN:  [id number]       Assessment / Plan:    Acute pyelonephritis, right side  : f/u on cultures. Cont ceftriaxone.  urine cx-p     Hyperglycemia  :  A1c. Add correction insulin.  a1c 10.9 c/w with uncontrolled new DM, will likely need insulin at [place], consult dm educator, add lantus, follow      Liver lesion   : appreciate GI consultation. plans for Bi[facility] tomorrow per gi. CXR baseline to assure no mets     HTN  : hctz held on admission. Cont prn labetalol.      Obesity (BMI 35.83)    Vaginal yeast- add diflucan + topical antifungal    Constipation- try po laxative and wean narcotics    Tob+- encourage cessation    Code Status: full code  DVT prophylaxis: lovenox       Disposition: home with [facility] when ready            Subjective:     Chief Complaint / Reason for Physician Visit  R flank pain     Patient reports she still has an ache in the right side of her back but much improved from admission. She is not eating much because she thinks is because she does not taste very good denies any nausea vomiting or abdominal pain. She has had no bowel movement in 3 days and we discussed trying a laxative. We also discussed her elevated A1c and new diagnosis of diabetes may require insulin at discharge. She denies any shortness of breath or cough or sputum production and admits to smoking 5 cigarettes a day prior to admission. She also has some tingling on the outside of her skin when she urinates and thinks she may have a yeast    Patient was evaluated at 8 a.m. Discussed with RN events overnight.      Review of Systems:  Symptom Y/N Comments  Symptom Y/N Comments   Fever/Chills    Chest Pain     Poor Appetite    Edema     Cough    Abdominal Pain     Sputum    Joint Pain     SOB/ALVAREZ    Pruritis/Rash     Nausea/vomit    Tolerating PT/OT     Diarrhea    Tolerating Diet     Constipation    Other       Could NOT obtain due to:      Objective:     VITALS:   Last 24hrs VS reviewed since prior progress note. Most recent are:  Patient Vitals for the past 24 hrs:   Temp Pulse Resp BP SpO2   05/22/18 1445 97.9 °F (36.6 °C) 62 18 146/72 93 %   05/22/18 0728 98 °F (36.7 °C) 76 - 148/71 90 %   05/21/18 2258 97.8 °F (36.6 °C) 64 18 147/63 97 %   05/21/18 1942 98.7 °F (37.1 °C) 82 18 142/59 91 %       Intake/Output Summary (Last 24 hours) at 05/22/18 1851  Last data filed at 05/22/18 1830   Gross per 24 hour   Intake              480 ml   Output                0 ml   Net              480 ml        PHYSICAL EXAM:  Patient is awake and alert oriented to distress on room air nontoxic-appearing conjunctiva are pink oropharynx mucous membranes are tacky no thrush or lesions cardiovascular regular rate no murmurs rubs or gallops lungs are clear without wheezes rhonchi or crackles abdomen is obese bowel sounds present soft mild tenderness in the right CVA region no rebound or guarding extremities no clubbing cyanosis or edema neuro exam is nonfocal.        Reviewed most current lab test results and cultures  YES  Reviewed most current radiology test results   YES  Review and summation of old records today    NO  Reviewed patient's current orders and MAR    YES  PMH/ reviewed - no change compared to H&P  ________________________________________________________________________  Care Plan discussed with:    Comments   Patient y    Family      RN y    Care Manager y    Consultant  y                      Multidiciplinary team rounds were held today with , nursing, pharmacist and clinical coordinator. Patient's plan of care was discussed; medications were reviewed and discharge planning was addressed.      ________________________________________________________________________  Total NON critical care TIME:   Minutes    Total CRITICAL CARE TIME Spent:   Minutes non procedure based      Comments   >50% of visit spent in counseling and coordination of care     ________________________________________________________________________  Ryan Cifuentes MD     Procedures: see electronic medical records for all procedures/Xrays and details which were not copied into this note but were reviewed prior to creation of Plan. LABS:  I reviewed today's most current labs and imaging studies.   Pertinent labs include:  Recent Labs      05/22/18   0652  05/21/18   0221 05/20/18 1919   WBC  5.3  5.9  8.2   HGB  14.9  14.3  16.1*   HCT  44.0  42.6  47.3*   PLT  210  203  245     Recent Labs      05/22/18   0554  05/21/18   0221 05/20/18 1919   NA  137  138  139   K  4.5  3.6  3.9   CL  105  106  106   CO2  24  25  28   GLU  175*  308*  217*   BUN  8  11  13   CREA  0.57  0.65  0.74   CA  8.7  8.6  9.7   ALB   --   2.9*  3.5   TBILI   --   0.3  0.4   SGOT   --   25  27   ALT   --   38  46       Signed: Ryan Cifuentes MD

## 2018-05-22 NOTE — PROGRESS NOTES
Bedside and Verbal shift change report given to Khurram Rodriguez (oncoming nurse) by Maxwell Squires (offgoing nurse). Report included the following information SBAR, Kardex, Intake/Output, MAR and Recent Results. Zone Phone for oncoming shift:   [phone]    Shift Summary: Patient rested quietly throughout the night. C/O pain in lower back. PRN pain medication given. LDAs               Peripheral IV 05/20/18 Left Hand (Active)   Site Assessment Clean, dry, & intact 5/22/2018  4:18 AM   Phlebitis Assessment 0 5/22/2018  4:18 AM   Infiltration Assessment 0 5/22/2018  4:18 AM   Dressing Status Clean, dry, & intact 5/22/2018  4:18 AM   Dressing Type Tape;Transparent 5/22/2018  4:18 AM   Hub Color/Line Status Pink;Flushed 5/22/2018  4:18 AM   Alcohol Cap Used Yes 5/22/2018  4:18 AM       Peripheral IV 05/20/18 Right Antecubital (Active)   Site Assessment Clean, dry, & intact 5/22/2018  4:18 AM   Phlebitis Assessment 0 5/22/2018  4:18 AM   Infiltration Assessment 0 5/22/2018  4:18 AM   Dressing Status Clean, dry, & intact 5/22/2018  4:18 AM   Dressing Type Tape;Transparent 5/22/2018  4:18 AM   Hub Color/Line Status Green;Capped;Flushed 5/22/2018  4:18 AM   Alcohol Cap Used Yes 5/21/2018  8:05 PM       Peripheral IV 05/21/18 Right Forearm (Active)   Site Assessment Clean, dry, & intact 5/22/2018  4:18 AM   Phlebitis Assessment 0 5/22/2018  4:18 AM   Infiltration Assessment 0 5/22/2018  4:18 AM   Dressing Status Clean, dry, & intact 5/22/2018  4:18 AM   Dressing Type Tape;Transparent 5/22/2018  4:18 AM   Hub Color/Line Status Pink; Infusing 5/22/2018  4:18 AM                        Intake & Output   Date 05/21/18 0700 - 05/22/18 0659 05/22/18 0700 - 05/23/18 0659   Shift 6778-6875 0047-7402 24 Hour Total 5334-5291 0548-3227 24 Hour Total   I  N  T  A  K  E   P.O. 240  240         P. O. 240  240       I.V.  (mL/kg/hr) 361.3  (0.3)  361.3         Volume (0.9% sodium chloride infusion) 361.3  361.3       Shift Total  (mL/kg) 601.3  (6) 601.3  (6)      O  U  T  P  U  T   Urine  (mL/kg/hr) 750  (0.6)  750         Urine Voided 750  750       Shift Total  (mL/kg) 750  (7.4)  750  (7.4)      NET -148.8  -148. 8      Weight (kg) 100.7 100.7 100.7 100.7 100.7 100.7      Last Bowel Movement Last Bowel Movement Date: 05/18/18   Glucose Checks [] N/A  [x] AC/HS  [] Q6  Concerns:   Nutrition Active Orders   Diet    DIET DIABETIC CONSISTENT CARB Regular       Consults []PT  []OT  []Speech  []Case Management   Cardiac Monitoring [x]N/A []Yes Expires:

## 2018-05-22 NOTE — DIABETES MGMT
DTC Consult Note    Recommendations/ Comments: Consult received. Noted Lantus ordered to start tonight. Pt required 7 units of correction in last ~ 15 hours. May consider:   1. Starting meal time insulin 3 units  DTC will continue to monitor BG trends for medication recommendations. DTC will attempt to complete rest of consult today. Current hospital DM medication: Lantus 10 units to start tonight, lispro correction - normal sensitivity     Consult received for:  []             Assessment of home management                [x]      Medication Recommendations                []             Meter/monitoring     [x]             Insulin instruction     [x]             New diagnosis     []             Outpatient education     []             Insulin pump patient     []             Insulin infusion     []             DKA/HHS    Chart reviewed and initial evaluation complete on Ansley Mujica. Patient is a 63 y.o. female with newly diagnosed DM. Discussed with patient and/or family need for follow up appointment for diabetes management after discharge. A1c:   Lab Results   Component Value Date/Time    Hemoglobin A1c 10.9 (H) 05/21/2018 02:10 PM       Recent Glucose Results: Lab Results   Component Value Date/Time     (H) 05/22/2018 05:54 AM    GLUCPOC 178 (H) 05/22/2018 07:42 AM    GLUCPOC 261 (H) 05/21/2018 08:45 PM    GLUCPOC 155 (H) 05/21/2018 04:07 PM        Lab Results   Component Value Date/Time    Creatinine 0.57 05/22/2018 05:54 AM     Estimated Creatinine Clearance: 130.5 mL/min (based on Cr of 0.57). Active Orders   Diet    DIET DIABETIC CONSISTENT CARB Regular        PO intake: Patient Vitals for the past 72 hrs:   % Diet Eaten   05/21/18 1809 100 %       Will continue to follow as needed. Thank you.       Rachael Finn, [address]    Office: [phone]

## 2018-05-22 NOTE — PROGRESS NOTES
Spiritual Care Partner Volunteer visited patient in [address] on 5/22/18. Documented by:  AVTAR Amaya

## 2018-05-23 ENCOUNTER — APPOINTMENT (OUTPATIENT)
Dept: CT IMAGING | Age: 58
DRG: 463 | End: 2018-05-23
Attending: INTERNAL MEDICINE
Payer: MEDICAID

## 2018-05-23 ENCOUNTER — HOME HEALTH ADMISSION (OUTPATIENT)
Dept: HOME HEALTH SERVICES | Facility: HOME HEALTH | Age: 58
End: 2018-05-23

## 2018-05-23 LAB
APPEARANCE UR: ABNORMAL
BACTERIA URNS QL MICRO: NEGATIVE /HPF
BILIRUB UR QL: NEGATIVE
COLOR UR: ABNORMAL
EPITH CASTS URNS QL MICRO: ABNORMAL /LPF
GLUCOSE BLD STRIP.AUTO-MCNC: 165 MG/DL (ref 65–100)
GLUCOSE BLD STRIP.AUTO-MCNC: 167 MG/DL (ref 65–100)
GLUCOSE BLD STRIP.AUTO-MCNC: 187 MG/DL (ref 65–100)
GLUCOSE BLD STRIP.AUTO-MCNC: 193 MG/DL (ref 65–100)
GLUCOSE BLD STRIP.AUTO-MCNC: 226 MG/DL (ref 65–100)
GLUCOSE UR STRIP.AUTO-MCNC: NEGATIVE MG/DL
HGB UR QL STRIP: NEGATIVE
KETONES UR QL STRIP.AUTO: NEGATIVE MG/DL
LEUKOCYTE ESTERASE UR QL STRIP.AUTO: ABNORMAL
MUCOUS THREADS URNS QL MICRO: ABNORMAL /LPF
NITRITE UR QL STRIP.AUTO: NEGATIVE
PH UR STRIP: 6.5 [PH] (ref 5–8)
PROT UR STRIP-MCNC: NEGATIVE MG/DL
RBC #/AREA URNS HPF: ABNORMAL /HPF (ref 0–5)
SERVICE CMNT-IMP: ABNORMAL
SP GR UR REFRACTOMETRY: 1.02 (ref 1–1.03)
UA: UC IF INDICATED,UAUC: ABNORMAL
UROBILINOGEN UR QL STRIP.AUTO: 0.2 EU/DL (ref 0.2–1)
WBC URNS QL MICRO: ABNORMAL /HPF (ref 0–4)

## 2018-05-23 PROCEDURE — 74011250636 HC RX REV CODE- 250/636: Performed by: HOSPITALIST

## 2018-05-23 PROCEDURE — 77030018781

## 2018-05-23 PROCEDURE — 77010033678 HC OXYGEN DAILY

## 2018-05-23 PROCEDURE — 74011000250 HC RX REV CODE- 250: Performed by: EMERGENCY MEDICINE

## 2018-05-23 PROCEDURE — 0FB13ZX EXCISION OF RIGHT LOBE LIVER, PERCUTANEOUS APPROACH, DIAGNOSTIC: ICD-10-PCS | Performed by: RADIOLOGY

## 2018-05-23 PROCEDURE — 77030003503 HC NDL BIOP TISS BD -B

## 2018-05-23 PROCEDURE — 99153 MOD SED SAME PHYS/QHP EA: CPT

## 2018-05-23 PROCEDURE — 88173 CYTOPATH EVAL FNA REPORT: CPT | Performed by: INTERNAL MEDICINE

## 2018-05-23 PROCEDURE — 74011636637 HC RX REV CODE- 636/637: Performed by: EMERGENCY MEDICINE

## 2018-05-23 PROCEDURE — 74011250636 HC RX REV CODE- 250/636: Performed by: RADIOLOGY

## 2018-05-23 PROCEDURE — 81001 URINALYSIS AUTO W/SCOPE: CPT

## 2018-05-23 PROCEDURE — 88307 TISSUE EXAM BY PATHOLOGIST: CPT | Performed by: INTERNAL MEDICINE

## 2018-05-23 PROCEDURE — 74011250637 HC RX REV CODE- 250/637: Performed by: EMERGENCY MEDICINE

## 2018-05-23 PROCEDURE — 65270000029 HC RM PRIVATE

## 2018-05-23 PROCEDURE — 77030003666 HC NDL SPINAL BD -A

## 2018-05-23 PROCEDURE — 82962 GLUCOSE BLOOD TEST: CPT

## 2018-05-23 PROCEDURE — 74011000258 HC RX REV CODE- 258: Performed by: HOSPITALIST

## 2018-05-23 PROCEDURE — 74011636637 HC RX REV CODE- 636/637: Performed by: HOSPITALIST

## 2018-05-23 PROCEDURE — 74011250636 HC RX REV CODE- 250/636

## 2018-05-23 PROCEDURE — 74011250636 HC RX REV CODE- 250/636: Performed by: EMERGENCY MEDICINE

## 2018-05-23 PROCEDURE — 88333 PATH CONSLTJ SURG CYTO XM 1: CPT | Performed by: INTERNAL MEDICINE

## 2018-05-23 RX ORDER — MORPHINE SULFATE 4 MG/ML
2 INJECTION INTRAVENOUS
Status: DISCONTINUED | OUTPATIENT
Start: 2018-05-23 | End: 2018-05-24 | Stop reason: HOSPADM

## 2018-05-23 RX ORDER — LIDOCAINE HYDROCHLORIDE 10 MG/ML
10 INJECTION, SOLUTION EPIDURAL; INFILTRATION; INTRACAUDAL; PERINEURAL
Status: COMPLETED | OUTPATIENT
Start: 2018-05-23 | End: 2018-05-23

## 2018-05-23 RX ORDER — SODIUM CHLORIDE 9 MG/ML
25 INJECTION, SOLUTION INTRAVENOUS CONTINUOUS
Status: DISCONTINUED | OUTPATIENT
Start: 2018-05-23 | End: 2018-05-23

## 2018-05-23 RX ORDER — INSULIN GLARGINE 100 [IU]/ML
15 INJECTION, SOLUTION SUBCUTANEOUS
Status: DISCONTINUED | OUTPATIENT
Start: 2018-05-23 | End: 2018-05-24 | Stop reason: HOSPADM

## 2018-05-23 RX ORDER — MORPHINE SULFATE 2 MG/ML
INJECTION, SOLUTION INTRAMUSCULAR; INTRAVENOUS
Status: COMPLETED
Start: 2018-05-23 | End: 2018-05-23

## 2018-05-23 RX ORDER — FENTANYL CITRATE 50 UG/ML
100 INJECTION, SOLUTION INTRAMUSCULAR; INTRAVENOUS
Status: DISCONTINUED | OUTPATIENT
Start: 2018-05-23 | End: 2018-05-23

## 2018-05-23 RX ORDER — MIDAZOLAM HYDROCHLORIDE 1 MG/ML
5 INJECTION, SOLUTION INTRAMUSCULAR; INTRAVENOUS
Status: DISCONTINUED | OUTPATIENT
Start: 2018-05-23 | End: 2018-05-23

## 2018-05-23 RX ADMIN — FENTANYL CITRATE 25 MCG: 50 INJECTION, SOLUTION INTRAMUSCULAR; INTRAVENOUS at 10:58

## 2018-05-23 RX ADMIN — INSULIN LISPRO 3 UNITS: 100 INJECTION, SOLUTION INTRAVENOUS; SUBCUTANEOUS at 16:10

## 2018-05-23 RX ADMIN — INSULIN GLARGINE 15 UNITS: 100 INJECTION, SOLUTION SUBCUTANEOUS at 22:21

## 2018-05-23 RX ADMIN — MORPHINE SULFATE 2 MG: 4 INJECTION INTRAVENOUS at 16:14

## 2018-05-23 RX ADMIN — Medication: at 08:09

## 2018-05-23 RX ADMIN — FENTANYL CITRATE 25 MCG: 50 INJECTION, SOLUTION INTRAMUSCULAR; INTRAVENOUS at 10:50

## 2018-05-23 RX ADMIN — FLUCONAZOLE 150 MG: 100 TABLET ORAL at 12:27

## 2018-05-23 RX ADMIN — Medication 2 MG: at 11:48

## 2018-05-23 RX ADMIN — Medication 5 ML: at 14:00

## 2018-05-23 RX ADMIN — OXYCODONE HYDROCHLORIDE AND ACETAMINOPHEN 2 TABLET: 5; 325 TABLET ORAL at 22:52

## 2018-05-23 RX ADMIN — LIDOCAINE HYDROCHLORIDE 10 ML: 10 INJECTION, SOLUTION EPIDURAL; INFILTRATION; INTRACAUDAL; PERINEURAL at 11:32

## 2018-05-23 RX ADMIN — CEFTRIAXONE 1 G: 1 INJECTION, POWDER, FOR SOLUTION INTRAMUSCULAR; INTRAVENOUS at 22:21

## 2018-05-23 RX ADMIN — FENTANYL CITRATE 25 MCG: 50 INJECTION, SOLUTION INTRAMUSCULAR; INTRAVENOUS at 11:07

## 2018-05-23 RX ADMIN — MORPHINE SULFATE 2 MG: 4 INJECTION INTRAVENOUS at 20:51

## 2018-05-23 RX ADMIN — SODIUM CHLORIDE 25 ML/HR: 900 INJECTION, SOLUTION INTRAVENOUS at 09:45

## 2018-05-23 RX ADMIN — MIDAZOLAM 1 MG: 1 INJECTION INTRAMUSCULAR; INTRAVENOUS at 10:50

## 2018-05-23 RX ADMIN — MICONAZOLE NITRATE 1 APPLICATOR: 20 CREAM VAGINAL at 22:21

## 2018-05-23 RX ADMIN — Medication: at 17:14

## 2018-05-23 RX ADMIN — MIDAZOLAM 1 MG: 1 INJECTION INTRAMUSCULAR; INTRAVENOUS at 11:01

## 2018-05-23 RX ADMIN — Medication 5 ML: at 05:16

## 2018-05-23 RX ADMIN — INSULIN LISPRO 2 UNITS: 100 INJECTION, SOLUTION INTRAVENOUS; SUBCUTANEOUS at 12:35

## 2018-05-23 NOTE — PROGRESS NOTES
Problem: Falls - Risk of  Goal: *Absence of Falls  Document Farshad Fall Risk and appropriate interventions in the flowsheet.    Outcome: Progressing Towards Goal  Fall Risk Interventions:            Medication Interventions: Teach patient to arise slowly

## 2018-05-23 NOTE — PROGRESS NOTES
Hospitalist Progress Note    NAME: June Hunt   :  1960   MRN:  [id number]       Assessment / Plan:    Acute pyelonephritis, right side  : f/u on cultures. Cont ceftriaxone.  urine cx-p   cx- mixed, contaminated, repeat ua to assure infection clearing     Hyperglycemia  :  A1c. Add correction insulin.  a1c 10.9 c/w with uncontrolled new DM, will likely need insulin at [place], consult dm educator, add lantus, follow   DM education appreciated,  Increase lantus today, follow    Liver lesion   : appreciate GI consultation. plans for Biospy tomorrow per gi. CXR baseline to assure no mets   for bx today, cea normal, ca 19.9 and afp-p     HTN  : hctz held on admission. Cont prn labetalol.      Obesity (BMI 35.83)    Vaginal yeast- add diflucan + topical antifungal, better heaven    Constipation- try po laxative and wean narcotics    Tob+- encourage cessation    Code Status: full code  DVT prophylaxis: lovenox       Disposition: home with [facility] when ready            Subjective:     Chief Complaint / Reason for Physician Visit  R flank pain      Patient reports she still has an ache in the right side of her back but much improved from admission. She is not eating much because she thinks is because she does not taste very good denies any nausea vomiting or abdominal pain. She has had no bowel movement in 3 days and we discussed trying a laxative. We also discussed her elevated A1c and new diagnosis of diabetes may require insulin at discharge. She denies any shortness of breath or cough or sputum production and admits to smoking 5 cigarettes a day prior to admission. She also has some tingling on the outside of her skin when she urinates and thinks she may have a yeast     Patient is going for liver biopsy today. She still has pain in her right flank and the Percocet does not seem to be helping too much but she is afraid to take more pain medicine.  She denies any chest pain or shortness of breath nausea or vomiting. She says her yeast infection seems better and her dysuria has improved. Patient was evaluated at 8:15 AM      Discussed with RN events overnight. Review of Systems:  Symptom Y/N Comments  Symptom Y/N Comments   Fever/Chills    Chest Pain n    Poor Appetite    Edema     Cough n   Abdominal Pain y    Sputum    Joint Pain     SOB/ALVAREZ n   Pruritis/Rash     Nausea/vomit    Tolerating PT/OT     Diarrhea    Tolerating Diet     Constipation    Other       Could NOT obtain due to:      Objective:     VITALS:   Last 24hrs VS reviewed since prior progress note.  Most recent are:  Patient Vitals for the past 24 hrs:   Temp Pulse Resp BP SpO2   05/23/18 1459 98.1 °F (36.7 °C) 70 14 149/84 -   05/23/18 1400 - 65 14 136/73 -   05/23/18 1302 - 65 14 157/77 -   05/23/18 1247 - 77 16 157/89 -   05/23/18 1232 - 73 16 151/79 -   05/23/18 1152 - 68 20 138/68 98 %   05/23/18 1138 - 72 18 137/69 99 %   05/23/18 1133 - 77 20 138/68 100 %   05/23/18 1128 - 81 18 137/70 100 %   05/23/18 1123 - 78 20 150/69 96 %   05/23/18 1118 - 74 18 133/84 98 %   05/23/18 1115 - 72 18 131/66 100 %   05/23/18 1110 - 71 18 130/67 100 %   05/23/18 1105 - 72 18 134/63 99 %   05/23/18 1100 - 63 18 121/77 97 %   05/23/18 1055 - 68 15 114/74 98 %   05/23/18 1050 - 71 16 119/70 95 %   05/23/18 0936 98.4 °F (36.9 °C) 70 20 (!) 134/96 95 %   05/23/18 0744 97.8 °F (36.6 °C) 70 20 147/63 96 %   05/22/18 2345 97.5 °F (36.4 °C) 60 18 118/76 100 %       Intake/Output Summary (Last 24 hours) at 05/23/18 1604  Last data filed at 05/22/18 2345   Gross per 24 hour   Intake              580 ml   Output                0 ml   Net              580 ml        PHYSICAL EXAM:  She is awake and alert she is oriented ×3 nontoxic-appearing on room air cardiovascular regular rate no murmurs rubs or gallops lungs are clear without wheezes rhonchi or crackles abdomen bowel sounds are present soft some guarding and tenderness in the right flank area no rebound extremities no clubbing cyanosis or edema      Reviewed most current lab test results and cultures  YES  Reviewed most current radiology test results   YES  Review and summation of old records today    NO  Reviewed patient's current orders and MAR    YES  PMH/SH reviewed - no change compared to H&P  ________________________________________________________________________  Care Plan discussed with:    Comments   Patient y    Family      RN y    Care Manager y    Consultant                        Multidiciplinary team rounds were held today with , nursing, pharmacist and clinical coordinator. Patient's plan of care was discussed; medications were reviewed and discharge planning was addressed. ________________________________________________________________________  Total NON critical care TIME:   Minutes    Total CRITICAL CARE TIME Spent:   Minutes non procedure based      Comments   >50% of visit spent in counseling and coordination of care     ________________________________________________________________________  Ny Hyde MD     Procedures: see electronic medical records for all procedures/Xrays and details which were not copied into this note but were reviewed prior to creation of Plan. LABS:  I reviewed today's most current labs and imaging studies.   Pertinent labs include:  Recent Labs      05/22/18   0652  05/21/18 0221 05/20/18 1919   WBC  5.3  5.9  8.2   HGB  14.9  14.3  16.1*   HCT  44.0  42.6  47.3*   PLT  210  203  245     Recent Labs      05/22/18   0554  05/21/18 0221 05/20/18 1919   NA  137  138  139   K  4.5  3.6  3.9   CL  105  106  106   CO2  24  25  28   GLU  175*  308*  217*   BUN  8  11  13   CREA  0.57  0.65  0.74   CA  8.7  8.6  9.7   ALB   --   2.9*  3.5   TBILI   --   0.3  0.4   SGOT   --   25  27   ALT   --   38  46       Signed: Ny Hyde MD

## 2018-05-23 NOTE — ROUTINE PROCESS
Name of procedure:CT guided liver lesion biopsy      Complications, if any, r/t procedure:None      Sedation medications given:Versed 2 mg and Fentanyl 75 mcg      Sedation tolerated: Yes      VS : Stable    Post Procedure Care Needed/order sets in [facility]: Yes post biopsy orders placed into [facility]      Any other specific needs to pt. Care:Pt received Morphine 2 mg IV at 1148 for preexisting back pain      TRANSFER - OUT REPORT:    Verbal report given to DANIEL Marks on Jim Alu  being transferred to room 21  for routine progression of care       Report consisted of patients Situation, Background, Assessment and   Recommendations(SBAR). Information from the following report(s) Procedure Summary was reviewed with the receiving nurse. Opportunity for questions and clarification was provided.       Patient transported with:   O2 @ 2 liters, with transporter via stretcher

## 2018-05-23 NOTE — PROGRESS NOTES
Attempted follow up visit with patient due to referral from spiritual care volunteer. Patient is not in her room at this time. Spiritual care will attempt to follow up with patient at a later time as able. Please page [phone] as needed. Visit by: Nikolay Ruano. Leonidas Huang.  Taylor Parker MA, [facility]    Lead  Profession Development & Advancement

## 2018-05-23 NOTE — PROGRESS NOTES
H order sent to [facility] to follow at d/c. They can follow at d/c. FOC on chart. IM sent to [facility] Russo, NN    1400  Per :  I spoke with Dr. Katelynn Ratliff office. The patient has already established care with a new PCP there, Dr. Sarai Mccallum. She saw him on May 16th for a hospital follow up appointment.   She has also already scheduled an appointment for this Friday at 3:00PM

## 2018-05-23 NOTE — PROGRESS NOTES
Bedside and Verbal shift change report given to Magdy Curry (oncoming nurse) by Pierre Underwood RN (offgoing nurse). Report included the following information Kardex, MAR and Recent Results.

## 2018-05-23 NOTE — PROGRESS NOTES
GI PROGRESS NOTE    NAME:             Kaylen Jane   :              1960   MRN:              [id number]   Admit Date:     2018  Todays Date:  2018      Subjective:         Less back pain.   No abd pain    Medications-reviewed     Current Facility-Administered Medications   Medication Dose Route Frequency    insulin glargine (LANTUS) injection 10 Units  10 Units SubCUTAneous QHS    fluconazole (DIFLUCAN) tablet 150 mg  150 mg Oral DAILY    hydrocortisone (CORTAID) 1 % cream   Topical BID    pantoprazole (PROTONIX) tablet 40 mg  40 mg Oral ACB    albuterol-ipratropium (DUO-NEB) 2.5 MG-0.5 MG/3 ML  3 mL Nebulization Q6H PRN    miconazole (MICOTIN) 2 % vaginal cream 1 Applicator  1 Applicator Vaginal QHS    oxyCODONE-acetaminophen (PERCOCET) 5-325 mg per tablet 2 Tab  2 Tab Oral Q6H PRN    cefTRIAXone (ROCEPHIN) 1 g in 0.9% sodium chloride (MBP/ADV) 50 mL  1 g IntraVENous Q24H    glucose chewable tablet 16 g  4 Tab Oral PRN    dextrose (D50W) injection syrg 12.5-25 g  12.5-25 g IntraVENous PRN    glucagon (GLUCAGEN) injection 1 mg  1 mg IntraMUSCular PRN    insulin lispro (HUMALOG) injection   SubCUTAneous AC&HS    sodium chloride (NS) flush 5-10 mL  5-10 mL IntraVENous Q8H    sodium chloride (NS) flush 5-10 mL  5-10 mL IntraVENous PRN    acetaminophen (TYLENOL) tablet 650 mg  650 mg Oral Q6H PRN    naloxone (NARCAN) injection 0.4 mg  0.4 mg IntraVENous PRN    ondansetron (ZOFRAN) injection 4 mg  4 mg IntraVENous Q4H PRN    bisacodyl (DULCOLAX) suppository 10 mg  10 mg Rectal DAILY PRN    enoxaparin (LOVENOX) injection 40 mg  40 mg SubCUTAneous Q24H        Objective:     Patient Vitals for the past 8 hrs:   BP Temp Pulse Resp SpO2   18 0744 147/63 97.8 °F (36.6 °C) 70 20 96 %         1901 -  0700  In: 580 [P.O.:480; I.V.:100]  Out: -     EXAM:     NEURO-a&o   HEENT-wnl   LUNGS-clear   COR-regular rate and rhythym   ABD-soft , no tenderness, no rebound, good bs        LABS:  Recent Labs      05/22/18   0652  05/21/18 0221   WBC  5.3  5.9   HGB  14.9  14.3   HCT  44.0  42.6   PLT  210  203     Recent Labs      05/22/18   0554  05/21/18 0221 05/20/18 1919   NA  137  138  139   K  4.5  3.6  3.9   CL  105  106  106   CO2  24  25  28   BUN  8  11  13   CREA  0.57  0.65  0.74   GLU  175*  308*  217*   CA  8.7  8.6  9.7     Recent Labs      05/21/18 0221 05/20/18 1919   SGOT  25  27   AP  80  90   TBILI  0.3  0.4   TP  6.4  7.5   ALB  2.9*  3.5   GLOB  3.5  4.0   LPSE   --   84   ALT  38  46                        Assessment:     1. Back pain---presumed secondary to pyelonephritis  2. Probable right-sided pyelonephritis. 3.  Asymptomatic gallstones. 4.  Indeterminate liver lesion, worrisome for solitary metastasis.              Active Problems:    Pyelonephritis (5/20/2018)        Plan:     1. We will have radiology proceed with biopsy of liver lesion today  3. Colonoscopy and upper endoscopy should be performed at some point, too, but will proceed with liver biopsy first  4.   AFP, CEA,  pending

## 2018-05-24 VITALS
SYSTOLIC BLOOD PRESSURE: 150 MMHG | RESPIRATION RATE: 20 BRPM | BODY MASS INDEX: 35.83 KG/M2 | HEART RATE: 78 BPM | OXYGEN SATURATION: 98 % | TEMPERATURE: 97.9 F | WEIGHT: 222 LBS | DIASTOLIC BLOOD PRESSURE: 72 MMHG

## 2018-05-24 DIAGNOSIS — E11.9 DIABETES MELLITUS WITHOUT COMPLICATION (HCC): Primary | ICD-10-CM

## 2018-05-24 PROBLEM — R16.0 LIVER MASS: Status: ACTIVE | Noted: 2018-05-24

## 2018-05-24 PROBLEM — N39.0 UTI (URINARY TRACT INFECTION): Status: ACTIVE | Noted: 2018-05-24

## 2018-05-24 LAB
AFP-TM SERPL-MCNC: 1.8 NG/ML (ref 0–8.3)
ANION GAP SERPL CALC-SCNC: 6 MMOL/L (ref 5–15)
BUN SERPL-MCNC: 13 MG/DL (ref 6–20)
BUN/CREAT SERPL: 19 (ref 12–20)
CALCIUM SERPL-MCNC: 9.1 MG/DL (ref 8.5–10.1)
CANCER AG19-9 SERPL-ACNC: 10 U/ML (ref 0–35)
CHLORIDE SERPL-SCNC: 105 MMOL/L (ref 97–108)
CO2 SERPL-SCNC: 26 MMOL/L (ref 21–32)
CREAT SERPL-MCNC: 0.67 MG/DL (ref 0.55–1.02)
GLUCOSE BLD STRIP.AUTO-MCNC: 218 MG/DL (ref 65–100)
GLUCOSE BLD STRIP.AUTO-MCNC: 223 MG/DL (ref 65–100)
GLUCOSE SERPL-MCNC: 180 MG/DL (ref 65–100)
HCT VFR BLD AUTO: 47.1 % (ref 35–47)
HGB BLD-MCNC: 15.8 G/DL (ref 11.5–16)
MAGNESIUM SERPL-MCNC: 2.2 MG/DL (ref 1.6–2.4)
POTASSIUM SERPL-SCNC: 4.3 MMOL/L (ref 3.5–5.1)
SERVICE CMNT-IMP: ABNORMAL
SERVICE CMNT-IMP: ABNORMAL
SODIUM SERPL-SCNC: 137 MMOL/L (ref 136–145)

## 2018-05-24 PROCEDURE — 36415 COLL VENOUS BLD VENIPUNCTURE: CPT

## 2018-05-24 PROCEDURE — 80048 BASIC METABOLIC PNL TOTAL CA: CPT

## 2018-05-24 PROCEDURE — 82962 GLUCOSE BLOOD TEST: CPT

## 2018-05-24 PROCEDURE — 74011250637 HC RX REV CODE- 250/637: Performed by: EMERGENCY MEDICINE

## 2018-05-24 PROCEDURE — 74011636637 HC RX REV CODE- 636/637: Performed by: HOSPITALIST

## 2018-05-24 PROCEDURE — 83735 ASSAY OF MAGNESIUM: CPT

## 2018-05-24 PROCEDURE — 85018 HEMOGLOBIN: CPT

## 2018-05-24 PROCEDURE — 74011250636 HC RX REV CODE- 250/636: Performed by: EMERGENCY MEDICINE

## 2018-05-24 RX ORDER — LANCETS
EACH MISCELLANEOUS
Qty: 1 EACH | Refills: 0 | Status: SHIPPED | OUTPATIENT
Start: 2018-05-24 | End: 2020-05-17

## 2018-05-24 RX ORDER — GLIPIZIDE 5 MG/1
5 TABLET, FILM COATED, EXTENDED RELEASE ORAL DAILY
Qty: 30 TAB | Refills: 0 | Status: SHIPPED | OUTPATIENT
Start: 2018-05-24 | End: 2018-06-23

## 2018-05-24 RX ORDER — OXYCODONE AND ACETAMINOPHEN 5; 325 MG/1; MG/1
2 TABLET ORAL
Qty: 15 TAB | Refills: 0 | Status: SHIPPED | OUTPATIENT
Start: 2018-05-24 | End: 2019-02-24

## 2018-05-24 RX ORDER — INSULIN GLARGINE 100 [IU]/ML
INJECTION, SOLUTION SUBCUTANEOUS
Qty: 1 PEN | Refills: 0 | Status: SHIPPED | OUTPATIENT
Start: 2018-05-24 | End: 2020-05-17

## 2018-05-24 RX ORDER — MICONAZOLE NITRATE 2 %
1 CREAM WITH APPLICATOR VAGINAL
Qty: 45 G | Refills: 0 | Status: SHIPPED | OUTPATIENT
Start: 2018-05-24 | End: 2018-05-29

## 2018-05-24 RX ORDER — CEFUROXIME AXETIL 500 MG/1
500 TABLET ORAL 2 TIMES DAILY
Qty: 10 TAB | Refills: 0 | Status: SHIPPED | OUTPATIENT
Start: 2018-05-24 | End: 2018-05-29

## 2018-05-24 RX ORDER — METFORMIN HYDROCHLORIDE 500 MG/1
500 TABLET, FILM COATED, EXTENDED RELEASE ORAL 2 TIMES DAILY
Qty: 30000 MG | Refills: 0 | Status: SHIPPED | OUTPATIENT
Start: 2018-05-24 | End: 2018-06-23

## 2018-05-24 RX ADMIN — INSULIN LISPRO 3 UNITS: 100 INJECTION, SOLUTION INTRAVENOUS; SUBCUTANEOUS at 08:42

## 2018-05-24 RX ADMIN — Medication: at 08:42

## 2018-05-24 RX ADMIN — MORPHINE SULFATE 2 MG: 4 INJECTION INTRAVENOUS at 05:56

## 2018-05-24 RX ADMIN — PANTOPRAZOLE SODIUM 40 MG: 40 TABLET, DELAYED RELEASE ORAL at 08:41

## 2018-05-24 RX ADMIN — Medication 10 ML: at 05:57

## 2018-05-24 RX ADMIN — INSULIN LISPRO 3 UNITS: 100 INJECTION, SOLUTION INTRAVENOUS; SUBCUTANEOUS at 12:30

## 2018-05-24 RX ADMIN — FLUCONAZOLE 150 MG: 100 TABLET ORAL at 08:41

## 2018-05-24 NOTE — PROGRESS NOTES
Pt given discharge instructions, hard scripts for medications, new medication education and spoke to pt regarding good dietary choices and her diabetic treatment. Discussed needed follow up appointments (diabetic treatment, Dr. Osbaldo Maya and Dr. Saray Beatty). Time for questions made. IV removed. Family to transport.

## 2018-05-24 NOTE — DIABETES MGMT
DTC Consult Note    Recommendations/ Comments: Please consider the followin- increasing Lantus to 20 units daily  2- adding Glipizide XR 5 mg acb  3- adding Metformin  mg BID daily    Current hospital DM medication: Lantus 15 units daily, Lispro Correctional insulin with normal sensitivity      DTC will continue to follow patient as needed. ____________________________    Consult received for:   [x]           Hospital Medication Recommendations                 Chart reviewed and initial evaluation complete on Ansley Mujica. Patient is a 63 y.o. female with new diagnosis of diabetes. A1c:   Lab Results   Component Value Date/Time    Hemoglobin A1c 10.9 (H) 2018 02:10 PM       Recent Glucose Results: Lab Results   Component Value Date/Time     (H) 2018 05:46 AM    GLUCPOC 223 (H) 2018 08:00 AM    GLUCPOC 193 (H) 2018 08:32 PM    GLUCPOC 226 (H) 2018 03:09 PM        Lab Results   Component Value Date/Time    Creatinine 0.67 2018 05:46 AM       Active Orders   Diet    DIET DIABETIC CONSISTENT CARB Regular        PO intake: Patient Vitals for the past 72 hrs:   % Diet Eaten   18 1830 100 %   18 1809 100 %       Patient follow appointment for diabetes management: 18- DTC survival skills class      Thank you.   Darylene Mems, [address], [address]  Office:  [phone]

## 2018-05-24 NOTE — DISCHARGE SUMMARY
Hospitalist Discharge Summary     Patient ID:  Obdulia Gonzalez  [id number]  63 y.o.  1960    PCP on record: Malcolm Cordoba MD    Admit date: 5/20/2018  Discharge date and time: 5/24/2018      DISCHARGE DIAGNOSIS:    Complicated Urinary tract infection with pyelonephritis, no definitive organism, present on admission  New diagnosis uncontrolled type 2 diabetes  Liver lesion concerning for metastases status post biopsy, pathology pending  Hypertension  Vaginal candidiasis  Obesity BMI 35  Tobacco use      CONSULTATIONS:  IP CONSULT TO HOSPITALIST  IP CONSULT TO GASTROENTEROLOGY    Excerpted HPI from H&P of Genet Richardson MD:  CHIEF COMPLAINT: I have had cramping in my right side for a week and was throwing up today     HISTORY OF PRESENT ILLNESS:  63 Y. O. WF  1 week ago, onset of a moderate to severe right flank cramping pain, also periumbilical  Worse with moving or going over bumps in car  Does not feel like her prior kidney stones or prior musculoskeletal pain  Dysuria going on for months  No fevers or chills  Stools loose and thin caliper x weeks, no blood or melena  Seen in ED 5/15/2018 for the pain, ? N/V                        CT scan with gallstone, 1.6 cm liver lesion, normal kidneys                        UA negative                        HCG negative  PO pain meds, referred to PCP  Changed to PO Motrin  Pain not improved, today develoepd N/V x 3 without blood or coffee grounds  Came back to ED     ED afebrile, WBC 7.5  Right CVA tenderness, mild abdominal tenderness  Normal LFTs and lipase  UA now with pyuria, bacteremia, increased squames  CT scan with non inflamed kidneys, no obstruction, gallstone present                        Liver lesion 1.6 cm read as possible met  ?  Pyelonephritis, IV ceftriaxone  We were called to admit the patient          ______________________________________________________________________  DISCHARGE SUMMARY/HOSPITAL COURSE:  for full details see H&P, daily progress notes, labs, consult notes. Acute pyelonephritis, right side  05/21: f/u on cultures.  Cont ceftriaxone. 5/22 urine cx-p  5/23 cx- mixed, contaminated, repeat ua to assure infection clearing  5/24 ua improved, change to ceftin for 10 day course      Hyperglycemia, new DM 2  05/21:  A1c. Add correction insulin. 5/22 a1c 10.9 c/w with uncontrolled new DM, will likely need insulin at [place], consult dm educator, add lantus, follow  5/23 DM education appreciated,  Increase lantus today, follow  5/24 add po glipizide and metformin and increase lantus     Liver lesion   05/21: appreciate GI consultation. Trisha Kline for Biospy tomorrow per gi. CXR baseline to assure no mets  5/23 for bx today, cea normal, ca 19.9 and afp-p  5/24 path-p, serologies normal ca 19.9 and afp, will need f/u with gi for path results      HTN  05/21: hctz held on admission.  Cont prn labetalol.       Obesity (BMI 35.83)     Vaginal yeast- add diflucan + topical antifungal, better      Constipation- try po laxative and wean narcotics     Tob+- encouraged cessation      Disposition:  Patient's initial urine was somewhat contaminated and grew mixed willie she has been on ceftriaxone with improved symptoms. We repeated a urinalysis yesterday which also appears to be contaminated but only 0-4 white blood cells rather than greater than 100 that was present on admission. Therefore we will change her from ceftriaxone to Ceftin to complete her course of oral antibiotics for 10 days. Patient underwent a liver biopsy and pathology is pending. I discussed this with gastroenterology who okayed her to go home and they will call her results and follow-up with her as an outpatient. Her CEA, CA-19-9, and a fetal protein levels were normal.    In terms of her new diagnosis of diabetes, A1c was 10.9. She was started on Lantus during his hospital stay and saw the diabetic educator. She now feels comfortable giving herself injections.  Will check her blood sugars before every meal and bedtime and with any unusual symptoms and take her log to her primary care physician for further adjustments. Diabetic educators also recommended starting glipizide and metformin in addition to the lantus. Patient will go [facility] today with an [facility]. Will follow up with her primary care physician within 1 week and with gastroenterology in the next 1-2 weeks for follow-up of her pathology. _______________________________________________________________________  Patient seen and examined by me on discharge day. Pertinent Findings:    Patient's liver biopsy yesterday went well. She denies any increased pain related to that. Right flank pain and back pain has improved and the Percocet is helping. Her urinary symptoms and her yeast infection also is improved. She is eager to go home today we do not have the pathology results. She has had to give herself insulin injections. Patient is awake and alert she is oriented ×3 well-appearing comfortable manner. Vascular regular rate lungs are clear abdomen bowel sounds are present minimal tenderness in the right side no rebound or guarding extre  _______________________________________________________________________  DISCHARGE MEDICATIONS:   Current Discharge Medication List      START taking these medications    Details   miconazole (MICOTIN) 2 % vaginal cream Insert 1 Applicator into vagina nightly for 5 days. Qty: 45 g, Refills: 0      oxyCODONE-acetaminophen (PERCOCET) 5-325 mg per tablet Take 2 Tabs by mouth every six (6) hours as needed. Max Daily Amount: 8 Tabs. Qty: 15 Tab, Refills: 0    Associated Diagnoses: Liver mass      insulin glargine (LANTUS SOLOSTAR U-100 INSULIN) 100 unit/mL (3 mL) inpn 20 units sq qhs  Qty: 1 Pen, Refills: 0      glipiZIDE SR (GLUCOTROL XL) 5 mg CR tablet Take 1 Tab by mouth daily for 30 days.   Qty: 30 Tab, Refills: 0      metFORMIN (GLUMETZA ER) 500 mg TG24 24 hour tablet Take 500 mg by mouth two (2) times a day for 30 days. Qty: 41490 mg, Refills: 0      cefUROXime (CEFTIN) 500 mg tablet Take 1 Tab by mouth two (2) times a day for 5 days. Qty: 10 Tab, Refills: 0      Lancets misc Check BG 4x/day before meals and bedtime  Qty: 1 Each, Refills: 0      glucose blood VI test strips (CONTOUR NEXT TEST STRIPS) strip As directed  Qty: 50 Strip, Refills: 0         CONTINUE these medications which have NOT CHANGED    Details   hydroCHLOROthiazide (HYDRODIURIL) 25 mg tablet Take 25 mg by mouth daily. ondansetron (ZOFRAN ODT) 4 mg disintegrating tablet Take 1 Tab by mouth every eight (8) hours as needed for Nausea. Qty: 10 Tab, Refills: 0         STOP taking these medications       dicyclomine (BENTYL) 10 mg capsule Comments:   Reason for Stopping:         oxyCODONE IR (ROXICODONE) 5 mg immediate release tablet Comments:   Reason for Stopping:         lidocaine (XYLOCAINE) 4 % topical cream Comments:   Reason for Stopping:               My Recommended Diet, Activity, Wound Care, and follow-up labs are listed in the patient's Discharge Insturctions which I have personally completed and reviewed.     ______________________________________________________________________    Risk of deterioration: Moderate    Condition at Discharge:  Stable  ______________________________________________________________________    Disposition  Home with family, no needs H2H visit  ______________________________________________________________________    Care Plan discussed with:   Patient, Family, RN, Care Manager, Consultant    ______________________________________________________________________    Code Status: Full Code  ______________________________________________________________________      Follow up with:   PCP : Rimma Ndiaye MD  Follow-up Information     Follow up With Details Comments Contact Info    [facility] DIABETIC TREATMENT On 6/8/2018 Diabetes Education Class on Friday, June 8th at 1:00pm. Please bring photo ID and inusurance information with you.  Please arrive 15 minutes early for check in [address]     Akbar Aguayo MD Go on 5/25/2018 For hospital follow up appointment at 3:00PM  [address] [facility] [address] Gifty Hines MD In 1 week  [address]  [address] [facility]      Akbar Aguayo MD In 1 week  [address] [facility]  [phone]                Total time in minutes spent coordinating this discharge (includes going over instructions, follow-up, prescriptions, and preparing report for sign off to her PCP) :  40 minutes    Signed:  Solange Gallegos MD

## 2018-05-24 NOTE — PROGRESS NOTES
Bedside shift change report given to DANIEL Snider (oncoming nurse) by Jesika Mehta RN (offgoing nurse). Report included the following information SBAR, Kardex, ED Summary, Procedure Summary, Intake/Output, MAR and Recent Results.

## 2018-05-24 NOTE — PROGRESS NOTES
General Daily Progress Note    Admit Date: 5/20/2018  Hospital day 5/24/2018    S- more comfortable with abd and back  O- normal AFP and . Liver bx pending     Y  N  [] []  Abd Pain:    [] [x]  Nausea:  [] [x] Vomiting:  [] []  Diarrhea:  [] []  Constipation:  [] []  Melena:  [] []  Hematochezia:  [x] []  Tolerating Diet:    Current Facility-Administered Medications   Medication Dose Route Frequency    morphine injection 2 mg  2 mg IntraVENous Q4H PRN    insulin glargine (LANTUS) injection 15 Units  15 Units SubCUTAneous QHS    fluconazole (DIFLUCAN) tablet 150 mg  150 mg Oral DAILY    hydrocortisone (CORTAID) 1 % cream   Topical BID    pantoprazole (PROTONIX) tablet 40 mg  40 mg Oral ACB    albuterol-ipratropium (DUO-NEB) 2.5 MG-0.5 MG/3 ML  3 mL Nebulization Q6H PRN    miconazole (MICOTIN) 2 % vaginal cream 1 Applicator  1 Applicator Vaginal QHS    oxyCODONE-acetaminophen (PERCOCET) 5-325 mg per tablet 2 Tab  2 Tab Oral Q6H PRN    cefTRIAXone (ROCEPHIN) 1 g in 0.9% sodium chloride (MBP/ADV) 50 mL  1 g IntraVENous Q24H    glucose chewable tablet 16 g  4 Tab Oral PRN    dextrose (D50W) injection syrg 12.5-25 g  12.5-25 g IntraVENous PRN    glucagon (GLUCAGEN) injection 1 mg  1 mg IntraMUSCular PRN    insulin lispro (HUMALOG) injection   SubCUTAneous AC&HS    sodium chloride (NS) flush 5-10 mL  5-10 mL IntraVENous Q8H    sodium chloride (NS) flush 5-10 mL  5-10 mL IntraVENous PRN    acetaminophen (TYLENOL) tablet 650 mg  650 mg Oral Q6H PRN    naloxone (NARCAN) injection 0.4 mg  0.4 mg IntraVENous PRN    ondansetron (ZOFRAN) injection 4 mg  4 mg IntraVENous Q4H PRN    bisacodyl (DULCOLAX) suppository 10 mg  10 mg Rectal DAILY PRN    enoxaparin (LOVENOX) injection 40 mg  40 mg SubCUTAneous Q24H        Review of Systems  Pertinent items are noted in HPI.     Objective:     Patient Vitals for the past 8 hrs:   BP Temp Pulse Resp SpO2   05/24/18 0832 150/72 97.9 °F (36.6 °C) 78 20 98 % 05/22 1901 - 05/24 0700  In: 100 [I.V.:100]  Out: -     Physical Exam:   Visit Vitals    /72 (BP 1 Location: Right arm, BP Patient Position: At rest)    Pulse 78    Temp 97.9 °F (36.6 °C)    Resp 20    Wt 100.7 kg (222 lb 0.1 oz)    SpO2 98%    BMI 35.83 kg/m2     General appearance: alert, cooperative, no distress, appears stated age  Abdomen: soft, non-tender.  Bowel sounds normal. No masses,  no organomegaly      Data Review   Recent Results (from the past 24 hour(s))   GLUCOSE, POC    Collection Time: 05/23/18 12:29 PM   Result Value Ref Range    Glucose (POC) 165 (H) 65 - 100 mg/dL    Performed by Jana Layton (PCT)    GLUCOSE, POC    Collection Time: 05/23/18  3:09 PM   Result Value Ref Range    Glucose (POC) 226 (H) 65 - 100 mg/dL    Performed by Jana Layton (PCT)    URINALYSIS W/ REFLEX CULTURE    Collection Time: 05/23/18  6:34 PM   Result Value Ref Range    Color YELLOW/STRAW      Appearance CLOUDY (A) CLEAR      Specific gravity 1.016 1.003 - 1.030      pH (UA) 6.5 5.0 - 8.0      Protein NEGATIVE  NEG mg/dL    Glucose NEGATIVE  NEG mg/dL    Ketone NEGATIVE  NEG mg/dL    Bilirubin NEGATIVE  NEG      Blood NEGATIVE  NEG      Urobilinogen 0.2 0.2 - 1.0 EU/dL    Nitrites NEGATIVE  NEG      Leukocyte Esterase MODERATE (A) NEG      WBC 0-4 0 - 4 /hpf    RBC 0-5 0 - 5 /hpf    Epithelial cells MANY (A) FEW /lpf    Bacteria NEGATIVE  NEG /hpf    UA:UC IF INDICATED CULTURE NOT INDICATED BY UA RESULT CNI      Mucus TRACE (A) NEG /lpf   GLUCOSE, POC    Collection Time: 05/23/18  8:32 PM   Result Value Ref Range    Glucose (POC) 193 (H) 65 - 100 mg/dL    Performed by Ramu Byrnes    METABOLIC PANEL, BASIC    Collection Time: 05/24/18  5:46 AM   Result Value Ref Range    Sodium 137 136 - 145 mmol/L    Potassium 4.3 3.5 - 5.1 mmol/L    Chloride 105 97 - 108 mmol/L    CO2 26 21 - 32 mmol/L    Anion gap 6 5 - 15 mmol/L    Glucose 180 (H) 65 - 100 mg/dL    BUN 13 6 - 20 MG/DL    Creatinine 0. 67 0.55 - 1.02 MG/DL    BUN/Creatinine ratio 19 12 - 20      GFR est AA >60 >60 ml/min/1.73m2    GFR est non-AA >60 >60 ml/min/1.73m2    Calcium 9.1 8.5 - 10.1 MG/DL   MAGNESIUM    Collection Time: 05/24/18  5:46 AM   Result Value Ref Range    Magnesium 2.2 1.6 - 2.4 mg/dL   HGB & HCT    Collection Time: 05/24/18  5:46 AM   Result Value Ref Range    HGB 15.8 11.5 - 16.0 g/dL    HCT 47.1 (H) 35.0 - 47.0 %   GLUCOSE, POC    Collection Time: 05/24/18  8:00 AM   Result Value Ref Range    Glucose (POC) 223 (H) 65 - 100 mg/dL    Performed by Vanessa Mercedes          Assessment:     Active Problems:    Pyelonephritis (5/20/2018)        Plan:     Diet as tolerated, await liver bx. If normal then OP follow up with Dr Lynn Rutledge .

## 2018-05-24 NOTE — DISCHARGE INSTRUCTIONS
HOSPITALIST DISCHARGE INSTRUCTIONS    NAME: Luigi Ellison   :  1960   MRN:  [id number]     Date/Time:  2018 12:57 PM    ADMIT DATE: 2018   DISCHARGE DATE: 2018         · It is important that you take the medication exactly as they are prescribed. · Keep your medication in the bottles provided by the pharmacist and keep a list of the medication names, dosages, and times to be taken in your wallet. · Do not take other medications without consulting your doctor. What to do at Home    Recommended diet:  Diabetic Diet    Recommended activity: Activity as tolerated, no heavy lifting, post op instructions per radiology recommendations       If you have questions regarding the hospital related prescriptions or hospital related issues please call [facility] at [phone]. You can always direct your questions to your primary care doctor if you are unable to reach your hospital physician; your PCP works as an extension of your hospital doctor just like your hospital doctor is an extension of your PCP for your time at the hospital [facility], [facility])    If you experience any of the following symptoms then please call your primary care physician or return to the emergency room if you cannot get hold of your doctor:    Fever, chills, nausea, vomiting, or persistent diarrhea  Worsening weakness or new problems with your speech or balance  Dark stools or visible blood in your stools  New Leg swelling or shortness of breath as these could be signs of a clot    Additional Instructions:    F/u with pcp w/in 1 week  F/u with GI in 1 week for bx results  Call MD if and increasing pain, trouble urinating, fevers or chills  Check BG before every meal and bedtime and take record to pcp    Bring these papers with you to your follow up appointments.  The papers will help your doctors be sure to continue the care plan from the hospital.              Information obtained by :  I understand that if any problems occur once I am at home I am to contact my physician. I understand and acknowledge receipt of the instructions indicated above.                                                                                                                                            Physician's or R.N.'s Signature                                                                  Date/Time                                                                                                                                              Patient or Representative Signature

## 2018-05-25 LAB
BACTERIA SPEC CULT: NORMAL
SERVICE CMNT-IMP: NORMAL

## 2018-05-27 ENCOUNTER — HOME CARE VISIT (OUTPATIENT)
Dept: HOME HEALTH SERVICES | Facility: HOME HEALTH | Age: 58
End: 2018-05-27

## 2018-05-29 ENCOUNTER — HOME CARE VISIT (OUTPATIENT)
Dept: HOME HEALTH SERVICES | Facility: HOME HEALTH | Age: 58
End: 2018-05-29

## 2018-05-31 ENCOUNTER — HOME CARE VISIT (OUTPATIENT)
Dept: HOME HEALTH SERVICES | Facility: HOME HEALTH | Age: 58
End: 2018-05-31

## 2019-02-24 ENCOUNTER — HOSPITAL ENCOUNTER (EMERGENCY)
Age: 59
Discharge: HOME | End: 2019-02-24
Attending: EMERGENCY MEDICINE | Admitting: EMERGENCY MEDICINE
Payer: MEDICAID

## 2019-02-24 ENCOUNTER — APPOINTMENT (OUTPATIENT)
Dept: CT IMAGING | Age: 59
End: 2019-02-24
Attending: EMERGENCY MEDICINE
Payer: MEDICAID

## 2019-02-24 ENCOUNTER — APPOINTMENT (OUTPATIENT)
Dept: ULTRASOUND IMAGING | Age: 59
End: 2019-02-24
Attending: EMERGENCY MEDICINE
Payer: MEDICAID

## 2019-02-24 VITALS
RESPIRATION RATE: 16 BRPM | TEMPERATURE: 98.9 F | HEIGHT: 64 IN | BODY MASS INDEX: 37.86 KG/M2 | SYSTOLIC BLOOD PRESSURE: 147 MMHG | OXYGEN SATURATION: 100 % | DIASTOLIC BLOOD PRESSURE: 75 MMHG | HEART RATE: 97 BPM | WEIGHT: 221.78 LBS

## 2019-02-24 DIAGNOSIS — L72.3 INFECTED SEBACEOUS CYST: ICD-10-CM

## 2019-02-24 DIAGNOSIS — L08.9 INFECTED SEBACEOUS CYST: ICD-10-CM

## 2019-02-24 DIAGNOSIS — G44.201 ACUTE INTRACTABLE TENSION-TYPE HEADACHE: Primary | ICD-10-CM

## 2019-02-24 LAB
ANION GAP SERPL CALC-SCNC: 8 MMOL/L (ref 5–15)
BASOPHILS # BLD: 0 K/UL (ref 0–0.1)
BASOPHILS NFR BLD: 1 % (ref 0–1)
BUN SERPL-MCNC: 14 MG/DL (ref 6–20)
BUN/CREAT SERPL: 17 (ref 12–20)
CALCIUM SERPL-MCNC: 9.3 MG/DL (ref 8.5–10.1)
CHLORIDE SERPL-SCNC: 104 MMOL/L (ref 97–108)
CO2 SERPL-SCNC: 25 MMOL/L (ref 21–32)
CREAT SERPL-MCNC: 0.83 MG/DL (ref 0.55–1.02)
DIFFERENTIAL METHOD BLD: NORMAL
EOSINOPHIL # BLD: 0.2 K/UL (ref 0–0.4)
EOSINOPHIL NFR BLD: 2 % (ref 0–7)
ERYTHROCYTE [DISTWIDTH] IN BLOOD BY AUTOMATED COUNT: 13.4 % (ref 11.5–14.5)
GLUCOSE SERPL-MCNC: 218 MG/DL (ref 65–100)
HCT VFR BLD AUTO: 44.5 % (ref 35–47)
HGB BLD-MCNC: 14.8 G/DL (ref 11.5–16)
IMM GRANULOCYTES # BLD AUTO: 0 K/UL (ref 0–0.04)
IMM GRANULOCYTES NFR BLD AUTO: 0 % (ref 0–0.5)
LYMPHOCYTES # BLD: 2.5 K/UL (ref 0.8–3.5)
LYMPHOCYTES NFR BLD: 33 % (ref 12–49)
MCH RBC QN AUTO: 29.8 PG (ref 26–34)
MCHC RBC AUTO-ENTMCNC: 33.3 G/DL (ref 30–36.5)
MCV RBC AUTO: 89.7 FL (ref 80–99)
MONOCYTES # BLD: 0.5 K/UL (ref 0–1)
MONOCYTES NFR BLD: 7 % (ref 5–13)
NEUTS SEG # BLD: 4.4 K/UL (ref 1.8–8)
NEUTS SEG NFR BLD: 58 % (ref 32–75)
NRBC # BLD: 0 K/UL (ref 0–0.01)
NRBC BLD-RTO: 0 PER 100 WBC
PLATELET # BLD AUTO: 233 K/UL (ref 150–400)
PMV BLD AUTO: 10.7 FL (ref 8.9–12.9)
POTASSIUM SERPL-SCNC: 3.9 MMOL/L (ref 3.5–5.1)
RBC # BLD AUTO: 4.96 M/UL (ref 3.8–5.2)
SODIUM SERPL-SCNC: 137 MMOL/L (ref 136–145)
WBC # BLD AUTO: 7.7 K/UL (ref 3.6–11)

## 2019-02-24 PROCEDURE — 74011250636 HC RX REV CODE- 250/636: Performed by: EMERGENCY MEDICINE

## 2019-02-24 PROCEDURE — 70450 CT HEAD/BRAIN W/O DYE: CPT

## 2019-02-24 PROCEDURE — 80048 BASIC METABOLIC PNL TOTAL CA: CPT

## 2019-02-24 PROCEDURE — 76642 ULTRASOUND BREAST LIMITED: CPT

## 2019-02-24 PROCEDURE — 96375 TX/PRO/DX INJ NEW DRUG ADDON: CPT

## 2019-02-24 PROCEDURE — 99281 EMR DPT VST MAYX REQ PHY/QHP: CPT

## 2019-02-24 PROCEDURE — 96365 THER/PROPH/DIAG IV INF INIT: CPT

## 2019-02-24 PROCEDURE — 85025 COMPLETE CBC W/AUTO DIFF WBC: CPT

## 2019-02-24 PROCEDURE — 36415 COLL VENOUS BLD VENIPUNCTURE: CPT

## 2019-02-24 RX ORDER — ONDANSETRON 2 MG/ML
4 INJECTION INTRAMUSCULAR; INTRAVENOUS
Status: COMPLETED | OUTPATIENT
Start: 2019-02-24 | End: 2019-02-24

## 2019-02-24 RX ORDER — FENTANYL CITRATE 50 UG/ML
50 INJECTION, SOLUTION INTRAMUSCULAR; INTRAVENOUS
Status: COMPLETED | OUTPATIENT
Start: 2019-02-24 | End: 2019-02-24

## 2019-02-24 RX ORDER — SODIUM CHLORIDE 0.9 % (FLUSH) 0.9 %
5-40 SYRINGE (ML) INJECTION AS NEEDED
Status: DISCONTINUED | OUTPATIENT
Start: 2019-02-24 | End: 2019-02-24 | Stop reason: HOSPADM

## 2019-02-24 RX ORDER — CLINDAMYCIN PHOSPHATE 600 MG/50ML
600 INJECTION INTRAVENOUS
Status: COMPLETED | OUTPATIENT
Start: 2019-02-24 | End: 2019-02-24

## 2019-02-24 RX ORDER — SODIUM CHLORIDE 0.9 % (FLUSH) 0.9 %
5-40 SYRINGE (ML) INJECTION EVERY 8 HOURS
Status: DISCONTINUED | OUTPATIENT
Start: 2019-02-24 | End: 2019-02-24 | Stop reason: HOSPADM

## 2019-02-24 RX ORDER — CLINDAMYCIN HYDROCHLORIDE 300 MG/1
300 CAPSULE ORAL 4 TIMES DAILY
Qty: 40 CAP | Refills: 0 | Status: SHIPPED | OUTPATIENT
Start: 2019-02-24 | End: 2019-04-05 | Stop reason: CLARIF

## 2019-02-24 RX ORDER — HYDROCODONE BITARTRATE AND ACETAMINOPHEN 5; 325 MG/1; MG/1
1 TABLET ORAL
Qty: 20 TAB | Refills: 0 | Status: SHIPPED | OUTPATIENT
Start: 2019-02-24 | End: 2019-04-05 | Stop reason: CLARIF

## 2019-02-24 RX ADMIN — ONDANSETRON 4 MG: 2 INJECTION INTRAMUSCULAR; INTRAVENOUS at 14:34

## 2019-02-24 RX ADMIN — Medication 10 ML: at 14:35

## 2019-02-24 RX ADMIN — CLINDAMYCIN PHOSPHATE 600 MG: 600 INJECTION, SOLUTION INTRAVENOUS at 14:34

## 2019-02-24 RX ADMIN — FENTANYL CITRATE 50 MCG: 50 INJECTION, SOLUTION INTRAMUSCULAR; INTRAVENOUS at 14:34

## 2019-02-24 NOTE — ED NOTES
Dr Lg Mcqueen reviewed discharge instructions with the patient. The patient verbalized understanding.

## 2019-02-24 NOTE — ED PROVIDER NOTES
EXPRESS CARE NOTE:  11:29 AM  I have seen and evaluated this patient in the Express Care portion of triage for L breast mass with surrounding erythema, body aches, chest discomfort. Also c/o facial pain, currently tx'd for a stye with ophthalmic drops. The patients care will begin now and orders have been placed. This patient will be seen and provided further care in the Emergency Room. BRIANA Valencia  EMERGENCY DEPARTMENT HISTORY AND PHYSICAL EXAM      Date: 2/24/2019  Patient Name: Luigi Ellison    History of Presenting Illness     Chief Complaint   Patient presents with    Breast pain     left breast lump onset redness within last three days       History Provided By: Patient    HPI: Luigi Ellison, 63 y.o. female with PMHx of DM, presents ambulatory to the ED with cc of gradual onset abscess on upper left breast, worsening in pain and size for past 3 days, but has had abscess for 3 years. Pt reports ~6 days ago, going to Urgent Care for swelling of left upper eyelid, which pt still c/o, and reports being diagnosed with an eye infection and being prescribed eyelids, which provided no relief. Pt also c/o constant HA and generalized body myalgias, onset ~8 days ago. She reports abscess on left breast exacerbated by left arm movement. Pt describes abscess pain as 8/10 on pain scale and sharp. She reports having nausea from HA. She also reports having constant leg pain, but states pain is worse today. Pt denies fever, medical hx of migraines, neck pain, vomiting, numbness, SOB, pain radiating to back or extremities, or diaphoresis. There are no other complaints, changes, or physical findings at this time. PCP: Dirk Coleman MD    No current facility-administered medications on file prior to encounter.       Current Outpatient Medications on File Prior to Encounter   Medication Sig Dispense Refill    insulin glargine (LANTUS SOLOSTAR U-100 INSULIN) 100 unit/mL (3 mL) inpn 20 units sq qhs 1 Pen 0    Lancets misc Check BG 4x/day before meals and bedtime 1 Each 0    glucose blood VI test strips (CONTOUR NEXT TEST STRIPS) strip As directed 50 Strip 0    hydroCHLOROthiazide (HYDRODIURIL) 25 mg tablet Take 25 mg by mouth daily.  ondansetron (ZOFRAN ODT) 4 mg disintegrating tablet Take 1 Tab by mouth every eight (8) hours as needed for Nausea. 10 Tab 0       Past History     Past Medical History:  Past Medical History:   Diagnosis Date    DM (diabetes mellitus) ([facility] [address]        Past Surgical History:  Past Surgical History:   Procedure Laterality Date    HX GYN         Family History:  No family history on file. Social History:  Social History     Tobacco Use    Smoking status: Current Every Day Smoker     Packs/day: 0.25    Smokeless tobacco: Never Used   Substance Use Topics    Alcohol use: No    Drug use: No       Allergies:  No Known Allergies      Review of Systems   Review of Systems   Constitutional: Negative for diaphoresis and fever. HENT: Negative for congestion. Eyes: Positive for pain. Respiratory: Negative for shortness of breath. Cardiovascular: Negative for chest pain. Gastrointestinal: Positive for nausea. Negative for vomiting. Endocrine: Negative for heat intolerance. Genitourinary: Negative for dysuria. Musculoskeletal: Positive for myalgias. Negative for neck pain. Skin: Positive for wound (left breast abscess). Allergic/Immunologic: Negative for immunocompromised state. Neurological: Positive for headaches. Negative for numbness. Hematological: Does not bruise/bleed easily. Psychiatric/Behavioral: Negative. All other systems reviewed and are negative. Physical Exam   Physical Exam   Constitutional: She is oriented to person, place, and time. She appears well-developed and well-nourished. No distress. HENT:   Head: Normocephalic and atraumatic. Eyes: EOM are normal. Pupils are equal, round, and reactive to light.    Swelling of the left upper lid laterally   Neck: Normal range of motion. Neck supple. Cardiovascular: Normal rate, regular rhythm and normal heart sounds. Pulmonary/Chest: Effort normal and breath sounds normal. No respiratory distress. Probable cyst on left upper chest, erythematous and TTP   Abdominal: Soft. Bowel sounds are normal. She exhibits no mass. There is no tenderness. Musculoskeletal: Normal range of motion. She exhibits no edema. Neurological: She is alert and oriented to person, place, and time. Coordination normal.   Sensory and motor skills intact   Skin: Skin is warm and dry. Psychiatric: She has a normal mood and affect. Her behavior is normal.   Nursing note and vitals reviewed. Diagnostic Study Results     Labs -     Recent Results (from the past 12 hour(s))   CBC WITH AUTOMATED DIFF    Collection Time: 02/24/19 11:29 AM   Result Value Ref Range    WBC 7.7 3.6 - 11.0 K/uL    RBC 4.96 3.80 - 5.20 M/uL    HGB 14.8 11.5 - 16.0 g/dL    HCT 44.5 35.0 - 47.0 %    MCV 89.7 80.0 - 99.0 FL    MCH 29.8 26.0 - 34.0 PG    MCHC 33.3 30.0 - 36.5 g/dL    RDW 13.4 11.5 - 14.5 %    PLATELET 284 825 - 527 K/uL    MPV 10.7 8.9 - 12.9 FL    NRBC 0.0 0  WBC    ABSOLUTE NRBC 0.00 0.00 - 0.01 K/uL    NEUTROPHILS 58 32 - 75 %    LYMPHOCYTES 33 12 - 49 %    MONOCYTES 7 5 - 13 %    EOSINOPHILS 2 0 - 7 %    BASOPHILS 1 0 - 1 %    IMMATURE GRANULOCYTES 0 0.0 - 0.5 %    ABS. NEUTROPHILS 4.4 1.8 - 8.0 K/UL    ABS. LYMPHOCYTES 2.5 0.8 - 3.5 K/UL    ABS. MONOCYTES 0.5 0.0 - 1.0 K/UL    ABS. EOSINOPHILS 0.2 0.0 - 0.4 K/UL    ABS. BASOPHILS 0.0 0.0 - 0.1 K/UL    ABS. IMM.  GRANS. 0.0 0.00 - 0.04 K/UL    DF AUTOMATED     METABOLIC PANEL, BASIC    Collection Time: 02/24/19 11:29 AM   Result Value Ref Range    Sodium 137 136 - 145 mmol/L    Potassium 3.9 3.5 - 5.1 mmol/L    Chloride 104 97 - 108 mmol/L    CO2 25 21 - 32 mmol/L    Anion gap 8 5 - 15 mmol/L    Glucose 218 (H) 65 - 100 mg/dL    BUN 14 6 - 20 MG/DL    Creatinine 0.83 0.55 - 1.02 MG/DL    BUN/Creatinine ratio 17 12 - 20      GFR est AA >60 >60 ml/min/1.73m2    GFR est non-AA >60 >60 ml/min/1.73m2    Calcium 9.3 8.5 - 10.1 MG/DL       Radiologic Studies -   CT HEAD WO CONT   Final Result   IMPRESSION: No acute intracranial process seen      US BREAST LT LIMITED=<3 QUAD   Final Result   Impression:      Possible infected sebaceous gland   Follow-up to complete resolution recommended. Standard bilateral mammography is recommended electively. The patient will be given the results by the ER physician. BI-RADS Category 0 incomplete: Bilateral mammography recommended. Electively. CT Results  (Last 48 hours)               02/24/19 1416  CT HEAD WO CONT Final result    Impression:  IMPRESSION: No acute intracranial process seen       Narrative:  EXAM: CT HEAD WO CONT       INDICATION: headache       COMPARISON: None. CONTRAST: None. TECHNIQUE: Unenhanced CT of the head was performed using 5 mm images. Brain and   bone windows were generated. CT dose reduction was achieved through use of a   standardized protocol tailored for this examination and automatic exposure   control for dose modulation. FINDINGS:   The ventricles and sulci are normal in size, shape and configuration and   midline. There is no significant white matter disease. There is no intracranial   hemorrhage, extra-axial collection, mass, mass effect or midline shift. The   basilar cisterns are open. No acute infarct is identified. The bone windows   demonstrate no abnormalities. The visualized portions of the paranasal sinuses   and mastoid air cells are clear. Medical Decision Making   I am the first provider for this patient. I reviewed the vital signs, available nursing notes, past medical history, past surgical history, family history and social history. Vital Signs-Reviewed the patient's vital signs.   Patient Vitals for the past 12 hrs:   Temp Pulse Resp BP SpO2   02/24/19 1034 98.9 °F (37.2 °C) 97 16 147/75 100 %       Pulse Oximetry Analysis - 100% on RA    Cardiac Monitor:   Rate: 97 bpm  Rhythm: Normal Sinus Rhythm 1034     Records Reviewed: Nursing Notes and Old Medical Records    Provider Notes (Medical Decision Making):   DDx: cyst, abscess, cellulitis, tension HA, intracranial hemorrhage, allergic reaction    ED Course:   Initial assessment performed. The patients presenting problems have been discussed, and they are in agreement with the care plan formulated and outlined with them. I have encouraged them to ask questions as they arise throughout their visit. Disposition:  Discharge Note:  3:01 PM  The pt is ready for discharge. The pt's signs, symptoms, diagnosis, and discharge instructions have been discussed and pt has conveyed their understanding. The pt is to follow up as recommended or return to ER should their symptoms worsen. Plan has been discussed and pt is in agreement. PLAN:  1. Current Discharge Medication List      START taking these medications    Details   clindamycin (CLEOCIN) 300 mg capsule Take 1 Cap by mouth four (4) times daily. Qty: 40 Cap, Refills: 0      HYDROcodone-acetaminophen (NORCO) 5-325 mg per tablet Take 1 Tab by mouth every four (4) hours as needed for Pain. Max Daily Amount: 6 Tabs. Qty: 20 Tab, Refills: 0    Associated Diagnoses: Acute intractable tension-type headache;  Infected sebaceous cyst         STOP taking these medications       oxyCODONE-acetaminophen (PERCOCET) 5-325 mg per tablet Comments:   Reason for Stoppin.   Follow-up Information     Follow up With Specialties Details Why Contact Info    Zoey Fragoso MD Internal Medicine In 2 days  [address] [facility]  [phone]      Tato Cardenas MD General Surgery, Breast Surgery, Endocrinology, Colon and Rectal Surgery Call  [address] [place] [address] 3 [address]  [phone]      [address] [facility] Srinivas Mohr, MD Internal Medicine Call to schedule a mammogram [address] [facility] [address]  [phone]      [facility] EMERGENCY DEPT Emergency Medicine  If symptoms worsen [address] [place] [address]  [phone]        Return to ED if worse     Diagnosis     Clinical Impression:   1. Acute intractable tension-type headache    2. Infected sebaceous cyst        Attestations: This note is prepared by Vikki Terry. Armando, acting as Scribe for MD Fei Napoles MD: The scribe's documentation has been prepared under my direction and personally reviewed by me in its entirety. I confirm that the note above accurately reflects all work, treatment, procedures, and medical decision making performed by me.

## 2019-02-24 NOTE — DISCHARGE INSTRUCTIONS
Patient Education        Tension Headache: Care Instructions  Your Care Instructions  Most headaches are tension headaches. These headaches tend to happen again, especially if you are under stress. A tension headache may cause pain or a feeling of pressure all over your head. You probably can't pinpoint the center of the pain. If you keep getting tension headaches, the best thing you can do to limit them is to find out what is causing them and then make changes in those areas. Follow-up care is a key part of your treatment and safety. Be sure to make and go to all appointments, and call your doctor if you are having problems. It's also a good idea to know your test results and keep a list of the medicines you take. How can you care for yourself at home? · Rest in a quiet, dark room with a cool cloth on your forehead until your headache is gone. Close your eyes, and try to relax or go to sleep. Don't watch TV or read. Avoid using the computer. · Use a warm, moist towel or a heating pad set on low to relax tight shoulder and neck muscles. · Have someone gently massage your neck and shoulders. · Take pain medicines exactly as directed. ? If the doctor gave you a prescription medicine for pain, take it as prescribed. ? If you are not taking a prescription pain medicine, ask your doctor if you can take an over-the-counter medicine. · Be careful not to take pain medicine more often than the instructions allow, because you may get worse or more frequent headaches when the medicine wears off. · If you get another tension headache, stop what you are doing and sit quietly for a moment. Close your eyes and breathe slowly. Try to relax your head and neck muscles. · Do not ignore new symptoms that occur with a headache, such as fever, weakness or numbness, vision changes, or confusion. These may be signs of a more serious problem.   To help prevent headaches  · Keep a headache diary so you can figure out what triggers your headaches. Avoiding triggers may help you prevent headaches. Record when each headache began, how long it lasted, and what the pain was like (throbbing, aching, stabbing, or dull). List anything that may have triggered the headache, such as being physically or emotionally stressed or being anxious or depressed. Other possible triggers are hunger, anger, fatigue, poor posture, and muscle strain. · Find healthy ways to deal with stress. Headaches are most common during or right after stressful times. Take time to relax before and after you do something that has caused a headache in the past.  · Exercise daily to relieve stress. Relaxation exercises may help reduce tension. · Get plenty of sleep. · Eat regularly and well. Long periods without food can trigger a headache. · Treat yourself to a massage. Some people find that massages are very helpful in relieving tension. · Try to keep your muscles relaxed by keeping good posture. Check your jaw, face, neck, and shoulder muscles for tension, and try to relax them. When sitting at a desk, change positions often, and stretch for 30 seconds each hour. · Reduce eyestrain from computers by blinking frequently and looking away from the computer screen every so often. Make sure you have proper eyewear and that your monitor is set up properly, about an arm's length away. When should you call for help? Call 911 anytime you think you may need emergency care. For example, call if:    · You have signs of a stroke. These may include:  ? Sudden numbness, paralysis, or weakness in your face, arm, or leg, especially on only one side of your body. ? Sudden vision changes. ? Sudden trouble speaking. ? Sudden confusion or trouble understanding simple statements. ? Sudden problems with walking or balance.   ? A sudden, severe headache that is different from past headaches.    Call your doctor now or seek immediate medical care if:    · You have new or worse nausea and vomiting.     · You have a new or higher fever.     · Your headache gets much worse.    Watch closely for changes in your health, and be sure to contact your doctor if:    · You are not getting better after 2 days (48 hours). Where can you learn more? Go to http://mayank-philip.info/. Enter 84 17 [id number] in the search box to learn more about \"Tension Headache: Care Instructions. \"  Current as of: Betty 3, 2018  Content Version: 11.9  © 8136-8814 [employer]. Care instructions adapted under license by [employer] (which disclaims liability or warranty for this information). If you have questions about a medical condition or this instruction, always ask your healthcare professional. [place] [address] any warranty or liability for your use of this information. Patient Education        Cellulitis: Care Instructions  Your Care Instructions    Cellulitis is a skin infection caused by bacteria, most often strep or staph. It often occurs after a break in the skin from a scrape, cut, bite, or puncture, or after a rash. Cellulitis may be treated without doing tests to find out what caused it. But your doctor may do tests, if needed, to look for a specific bacteria, like methicillin-resistant Staphylococcus aureus (MRSA). The doctor has checked you carefully, but problems can develop later. If you notice any problems or new symptoms, get medical treatment right away. Follow-up care is a key part of your treatment and safety. Be sure to make and go to all appointments, and call your doctor if you are having problems. It's also a good idea to know your test results and keep a list of the medicines you take. How can you care for yourself at home? · Take your antibiotics as directed. Do not stop taking them just because you feel better. You need to take the full course of antibiotics. · Prop up the infected area on pillows to reduce pain and swelling.  Try to keep the area above the level of your heart as often as you can. · If your doctor told you how to care for your wound, follow your doctor's instructions. If you did not get instructions, follow this general advice:  ? Wash the wound with clean water 2 times a day. Don't use hydrogen peroxide or alcohol, which can slow healing. ? You may cover the wound with a thin layer of petroleum jelly, such as Vaseline, and a nonstick bandage. ? Apply more petroleum jelly and replace the bandage as needed. · Be safe with medicines. Take pain medicines exactly as directed. ? If the doctor gave you a prescription medicine for pain, take it as prescribed. ? If you are not taking a prescription pain medicine, ask your doctor if you can take an over-the-counter medicine. To prevent cellulitis in the future  · Try to prevent cuts, scrapes, or other injuries to your skin. Cellulitis most often occurs where there is a break in the skin. · If you get a scrape, cut, mild burn, or bite, wash the wound with clean water as soon as you can to help avoid infection. Don't use hydrogen peroxide or alcohol, which can slow healing. · If you have swelling in your legs (edema), support stockings and good skin care may help prevent leg sores and cellulitis. · Take care of your feet, especially if you have diabetes or other conditions that increase the risk of infection. Wear shoes and socks. Do not go barefoot. If you have athlete's foot or other skin problems on your feet, talk to your doctor about how to treat them. When should you call for help? Call your doctor now or seek immediate medical care if:    · You have signs that your infection is getting worse, such as:  ? Increased pain, swelling, warmth, or redness. ? Red streaks leading from the area. ? Pus draining from the area. ? A fever.     · You get a rash.    Watch closely for changes in your health, and be sure to contact your doctor if:    · You do not get better as expected. Where can you learn more? Go to http://mayank-philip.info/. Giovani Sands in the search box to learn more about \"Cellulitis: Care Instructions. \"  Current as of: April 17, 2018  Content Version: 11.9  © 4815-4169 [employer], CME. Care instructions adapted under license by [employer] (which disclaims liability or warranty for this information). If you have questions about a medical condition or this instruction, always ask your healthcare professional. [address] any warranty or liability for your use of this information.

## 2019-02-27 ENCOUNTER — OFFICE VISIT (OUTPATIENT)
Dept: SURGERY | Age: 59
End: 2019-02-27

## 2019-02-27 VITALS
BODY MASS INDEX: 38.24 KG/M2 | OXYGEN SATURATION: 96 % | WEIGHT: 224 LBS | HEART RATE: 82 BPM | DIASTOLIC BLOOD PRESSURE: 83 MMHG | SYSTOLIC BLOOD PRESSURE: 131 MMHG | HEIGHT: 64 IN | TEMPERATURE: 97.7 F

## 2019-02-27 DIAGNOSIS — L02.91 ABSCESS: Primary | ICD-10-CM

## 2019-02-27 PROBLEM — E66.01 SEVERE OBESITY (HCC): Status: ACTIVE | Noted: 2019-02-27

## 2019-02-27 RX ORDER — GLIPIZIDE 5 MG/1
10 TABLET ORAL DAILY
COMMUNITY
End: 2022-02-23

## 2019-02-27 RX ORDER — METFORMIN HYDROCHLORIDE 500 MG/1
750 TABLET ORAL 2 TIMES DAILY WITH MEALS
COMMUNITY
End: 2020-05-17

## 2019-02-27 NOTE — PROGRESS NOTES
Surgery Consult:  Breast abscess Requesting physician:  ER Subjective:  
Patient 63 y.o. female has had small cyst on her left breast for about 1.5 years. It hasn't been bothering her until a week ago when she noted marked increase in size with erythema and tenderness. No drainage. No F/C/S. Patient was seen in ER on 2/24/19 and was given Rx for clindamycin. No improvement despite antibiotics. Denies any recent trauma to this area. Past Medical & Surgical History: 
Past Medical History:  
Diagnosis Date  Calculus of kidney  DM (diabetes mellitus) ([facility] [address]  Headache  Sleep apnea Past Surgical History:  
Procedure Laterality Date  HX GYN    
 OVERY REMOVED  HX OTHER SURGICAL  2010 KIDNEY STONES REMOVED Social History: 
Social History Socioeconomic History  Marital status:  Spouse name: Not on file  Number of children: Not on file  Years of education: Not on file  Highest education level: Not on file Social Needs  Financial resource strain: Not on file  Food insecurity - worry: Not on file  Food insecurity - inability: Not on file  Transportation needs - medical: Not on file  Transportation needs - non-medical: Not on file Occupational History  Not on file Tobacco Use  Smoking status: Current Every Day Smoker Packs/day: 0.25  Smokeless tobacco: Never Used Substance and Sexual Activity  Alcohol use: No  
 Drug use: No  
 Sexual activity: Not on file Other Topics Concern  Not on file Social History Narrative  Not on file Family History: No family history on file. Medications: 
Current Outpatient Medications Medication Sig  
 glipiZIDE (GLUCOTROL) 5 mg tablet Take  by mouth daily.  clopidogrel bisulfate (CLOPIDOGREL PO) Take  by mouth daily.  metFORMIN (GLUCOPHAGE) 500 mg tablet Take  by mouth two (2) times daily (with meals).  clindamycin (CLEOCIN) 300 mg capsule Take 1 Cap by mouth four (4) times daily.  insulin glargine (LANTUS SOLOSTAR U-100 INSULIN) 100 unit/mL (3 mL) inpn 20 units sq qhs (Patient taking differently: 26 units sq qhs)  Lancets misc Check BG 4x/day before meals and bedtime (Patient taking differently: two (2) times a day. Check BG 4x/day before meals and bedtime)  glucose blood VI test strips (CONTOUR NEXT TEST STRIPS) strip As directed (Patient taking differently: two (2) times a day. As directed)  hydroCHLOROthiazide (HYDRODIURIL) 25 mg tablet Take 25 mg by mouth daily.  ondansetron (ZOFRAN ODT) 4 mg disintegrating tablet Take 1 Tab by mouth every eight (8) hours as needed for Nausea.  HYDROcodone-acetaminophen (NORCO) 5-325 mg per tablet Take 1 Tab by mouth every four (4) hours as needed for Pain. Max Daily Amount: 6 Tabs. No current facility-administered medications for this visit. Allergies: 
No Known Allergies Review of Systems A comprehensive review of systems was negative except for that written in the HPI. Objective:  
 
Exam: 
 
Visit Vitals /83 (BP 1 Location: Left arm, BP Patient Position: Sitting) Pulse 82 Temp 97.7 °F (36.5 °C) Ht 5' 4\" (1.626 m) Wt 101.6 kg (224 lb) SpO2 96% BMI 38.45 kg/m² General appearance: alert, cooperative, no distress, appears stated age Lungs: clear to auscultation bilaterally Heart: regular rate and rhythm Extremities: extremities normal, atraumatic, no cyanosis or edema Skin: Skin color, texture, turgor normal. Left breast in the upper inner quadrant with 4 x 5 cm erythematous tender swelling with fluctuance. No drainage. Neurologic: Grossly normal 
 
 
Procedure Note:  I&D left breast abscess After informed consent and time out were performed, left breast was prepped and draped in standard surgical fashion. Local was injected in the abscess with central fluctuance and an incision was made.   Copious amount of pus and waxy material drained. Purulent drainage was sent for culture. Abscess cavity was explored and it measured 3 x 3 cm. Abscess cavity was then irrigated with hydrogen peroxide followed by packing. Hemostasis was good. Dry dressing was then applied. Patient tolerated the procedure well. Assessment:  
 
Left breast abscess Plan:  
 
I&D done Continue antibiotics Check culture Wound care daily RTC in 2 weeks Will need excision of the cyst once the infection clears.

## 2019-02-27 NOTE — PROGRESS NOTES
Chief Complaint Patient presents with  Cyst  
  SEB. CYST ON CHEST / PT. WAS SEEN IN ED 1. Have you been to the ER, urgent care clinic since your last visit? Hospitalized since your last visit? YES, 2/24/19 
 
2. Have you seen or consulted any other health care providers outside of the [address] [place] [address] since your last visit? Include any pap smears or colon screening.  NO

## 2019-02-27 NOTE — PROGRESS NOTES
[facility] PROCEDURE PROGRESS NOTE Chart reviewed for the following: 
 Dinorah Harvey, have reviewed the History, Physical and updated the Allergic reactions for [employer] TIME OUT performed immediately prior to start of procedure: 
 Dinorah Harvey, have performed the following reviews on [employer] prior to the start of the procedure: 
         
* Patient was identified by name and date of birth * Agreement on procedure being performed was verified * Risks and Benefits explained to the patient * Procedure site verified and marked as necessary * Patient was positioned for comfort * Consent was signed and verified Time:10:40 AM 
 
 
Date of procedure: 2/27/2019 Procedure performed by:  Donato Covarrubias MD 
 
Provider assisted by:  MA 
 
Patient assisted by:  How tolerated by patient: tolerated the procedure well with no complications Post Procedural Pain Scale: 0 - No Hurt Comments: none

## 2019-03-03 LAB — BACTERIA FLD CULT: NORMAL

## 2019-03-13 ENCOUNTER — OFFICE VISIT (OUTPATIENT)
Dept: SURGERY | Age: 59
End: 2019-03-13

## 2019-03-13 VITALS
OXYGEN SATURATION: 97 % | HEART RATE: 66 BPM | WEIGHT: 229 LBS | SYSTOLIC BLOOD PRESSURE: 157 MMHG | RESPIRATION RATE: 20 BRPM | BODY MASS INDEX: 39.09 KG/M2 | TEMPERATURE: 97.4 F | HEIGHT: 64 IN | DIASTOLIC BLOOD PRESSURE: 83 MMHG

## 2019-03-13 DIAGNOSIS — D17.20 LIPOMA OF UPPER EXTREMITY, UNSPECIFIED LATERALITY: ICD-10-CM

## 2019-03-13 DIAGNOSIS — N60.82 SEBACEOUS CYST OF BREAST, LEFT: Primary | ICD-10-CM

## 2019-03-13 PROBLEM — N60.02 BREAST CYST, LEFT: Status: ACTIVE | Noted: 2019-03-13

## 2019-03-13 NOTE — PROGRESS NOTES
Chief Complaint   Patient presents with    Breast Problem     2 week f/u left breast abscess last seen 02/27/19     1. Have you been to the ER, urgent care clinic since your last visit? Hospitalized since your last visit? No    2. Have you seen or consulted any other health care providers outside of the [address] [place] [address] since your last visit? Include any pap smears or colon screening.  No

## 2019-03-13 NOTE — PROGRESS NOTES
Surgery H&P    Subjective:   Patient 63 y.o.  female s/p I&D of left breast abscess on 2/27/19 presents today for follow up. Currently doing wound care daily and noticed some waxy material drain from the wound. No drainage. Patient also complains of tender nodule in her left upper arm that she had for 4 months. It's increasing in size. Denies any skin changes overlying the lump. No recent trauma. Past Medical & Surgical History:  Past Medical History:   Diagnosis Date    Calculus of kidney     DM (diabetes mellitus) ([facility] [address]     Headache     Sleep apnea       Past Surgical History:   Procedure Laterality Date    HX GYN      OVERY REMOVED    HX OTHER SURGICAL  2010    KIDNEY STONES REMOVED       Social History:  Social History     Socioeconomic History    Marital status:      Spouse name: Not on file    Number of children: Not on file    Years of education: Not on file    Highest education level: Not on file   Social Needs    Financial resource strain: Not on file    Food insecurity - worry: Not on file    Food insecurity - inability: Not on file   [employer] needs - medical: Not on file   [employer] needs - non-medical: Not on file   Occupational History    Not on file   Tobacco Use    Smoking status: Current Every Day Smoker     Packs/day: 0.25    Smokeless tobacco: Never Used   Substance and Sexual Activity    Alcohol use: No    Drug use: No    Sexual activity: Not on file   Other Topics Concern    Not on file   Social History Narrative    Not on file        Family History:  History reviewed. No pertinent family history. Medications:  Current Outpatient Medications   Medication Sig    glipiZIDE (GLUCOTROL) 5 mg tablet Take  by mouth daily.  metFORMIN (GLUCOPHAGE) 500 mg tablet Take  by mouth two (2) times daily (with meals).     insulin glargine (LANTUS SOLOSTAR U-100 INSULIN) 100 unit/mL (3 mL) inpn 20 units sq qhs (Patient taking differently: 26 units sq qhs)    Lancets misc Check BG 4x/day before meals and bedtime (Patient taking differently: two (2) times a day. Check BG 4x/day before meals and bedtime)    glucose blood VI test strips (CONTOUR NEXT TEST STRIPS) strip As directed (Patient taking differently: two (2) times a day. As directed)    hydroCHLOROthiazide (HYDRODIURIL) 25 mg tablet Take 25 mg by mouth daily.  clopidogrel bisulfate (CLOPIDOGREL PO) Take  by mouth daily.  clindamycin (CLEOCIN) 300 mg capsule Take 1 Cap by mouth four (4) times daily.  HYDROcodone-acetaminophen (NORCO) 5-325 mg per tablet Take 1 Tab by mouth every four (4) hours as needed for Pain. Max Daily Amount: 6 Tabs.  ondansetron (ZOFRAN ODT) 4 mg disintegrating tablet Take 1 Tab by mouth every eight (8) hours as needed for Nausea. No current facility-administered medications for this visit. Allergies:  No Known Allergies    Review of Systems  A comprehensive review of systems was negative except for that written in the HPI. Objective:     Exam:    Visit Vitals  /83 (BP 1 Location: Right arm, BP Patient Position: Sitting)   Pulse 66   Temp 97.4 °F (36.3 °C)   Resp 20   Ht 5' 4\" (1.626 m)   Wt 103.9 kg (229 lb)   SpO2 97%   BMI 39.31 kg/m²     General appearance: alert, cooperative, no distress, appears stated age  Extremities: extremities normal, atraumatic, no cyanosis or edema. Skin: Skin color, texture, turgor normal. Left breast wound clean and measures 1 x 0.3 cm. Neosporin applied. No erythema or drainage. Left upper inner arm with 1.5 x 1.5 cm rubbery mobile nodule. Mildly tender. No erythema or skin changes overlying the lump. Neurologic: Grossly normal    Assessment:     S/p I&D  Left breast abscess  Left upper arm lipoma    Plan:     Excision of left breast cyst and left upper arm lipoma when wound heals  Risks, benefit, and alternative to surgery was discussed with the patient.   The risks of surgery include but not limited to infection, bleeding, recurrence, nerve injury, and the risks of anesthetic. Patient is agreeable to surgery. All questions answered.

## 2019-04-04 ENCOUNTER — HOSPITAL ENCOUNTER (OUTPATIENT)
Dept: NON INVASIVE DIAGNOSTICS | Age: 59
Discharge: HOME | End: 2019-04-04
Payer: MEDICAID

## 2019-04-04 DIAGNOSIS — N60.89: ICD-10-CM

## 2019-04-04 LAB
ATRIAL RATE: 78 BPM
CALCULATED P AXIS, ECG09: 59 DEGREES
CALCULATED R AXIS, ECG10: 20 DEGREES
CALCULATED T AXIS, ECG11: 52 DEGREES
DIAGNOSIS, 93000: NORMAL
P-R INTERVAL, ECG05: 152 MS
Q-T INTERVAL, ECG07: 404 MS
QRS DURATION, ECG06: 86 MS
QTC CALCULATION (BEZET), ECG08: 460 MS
VENTRICULAR RATE, ECG03: 78 BPM

## 2019-04-04 PROCEDURE — 93005 ELECTROCARDIOGRAM TRACING: CPT

## 2019-04-05 RX ORDER — ATORVASTATIN CALCIUM 10 MG/1
80 TABLET, FILM COATED ORAL DAILY
COMMUNITY
End: 2020-05-17

## 2019-04-05 NOTE — PERIOP NOTES
Patient states she was told she is a difficult intubation and was given papers. I asked her to please find the papers & bring with her DOS.

## 2019-04-05 NOTE — PERIOP NOTES
[place] [facility]  Preoperative Instructions        Surgery Date 4/15/19          Time of Arrival 0700    1. On the day of your surgery, please report to the Surgical Services Registration Desk and sign in at your designated time. The Surgery Center is located to the right of the Emergency Room. 2. You must have someone with you to drive you home. You should not drive a car for 24 hours following surgery. Please make arrangements for a friend or family member to stay with you for the first 24 hours after your surgery. 3. Do not have anything to eat or drink (including water, gum, mints, coffee, juice) after midnight ? 4/14/19.? Mabeline Sink ? This may not apply to medications prescribed by your physician. ?(Please note below the special instructions with medications to take the morning of your procedure.)    4. We recommend you do not drink any alcoholic beverages for 24 hours before and after your surgery. 5. Contact your surgeons office for instructions on the following medications: non-steroidal anti-inflammatory drugs (i.e. Advil, Aleve), vitamins, and supplements. (Some surgeons will want you to stop these medications prior to surgery and others may allow you to take them)  **If you are currently taking Plavix, Coumadin, Aspirin and/or other blood-thinning agents, contact your surgeon for instructions. ** Your surgeon will partner with the physician prescribing these medications to determine if it is safe to stop or if you need to continue taking. Please do not stop taking these medications without instructions from your surgeon    6. Wear comfortable clothes. Wear glasses instead of contacts. Do not bring any money or jewelry. Please bring picture ID, insurance card, and any prearranged co-payment or hospital payment. Do not wear make-up, particularly mascara the morning of your surgery. Do not wear nail polish, particularly if you are having foot /hand surgery.   Wear your hair loose or down, no ponytails, buns, alex pins or clips. All body piercings must be removed. Please shower with antibacterial soap for three consecutive days before and on the morning of surgery, but do not apply any lotions, powders or deodorants after the shower on the day of surgery. Please use a fresh towels after each shower. Please sleep in clean clothes and change bed linens the night before surgery. Please do not shave for 48 hours prior to surgery. Shaving of the face is acceptable. 7. You should understand that if you do not follow these instructions your surgery may be cancelled. If your physical condition changes (I.e. fever, cold or flu) please contact your surgeon as soon as possible. 8. It is important that you be on time. If a situation occurs where you may be late, please call [phone] (OR Holding Area). 9. If you have any questions and or problems, please call [phone] (Pre-admission Testing). 10. Your surgery time may be subject to change. You will receive a phone call the evening prior if your time changes. 11.  If having outpatient surgery, you must have someone to drive you here, stay with you during the duration of your stay, and to drive you home at time of discharge. 12.   In an effort to improve the efficiency, privacy, and safety for all of our Pre-op patients visitors are not allowed in the Holding area. Once you arrive and are registered your family/visitors will be asked to remain in the waiting room. The Pre-op staff will get you from the Surgical Waiting Area and will explain to you and your family/visitors that the Pre-op phase is beginning. The staff will answer any questions and provide instructions for tracking of the patient, by use of the existing tracking number and color-coded status board in the waiting room.   At this time the staff will also ask for your designated spokesperson information in the event that the physician or staff need to provide an update or obtain any pertinent information. The designated spokesperson will be notified if the physician needs to speak to family during the pre-operative phase. If at any time your family/visitors has questions or concerns they may approach the volunteer desk in the waiting area for assistance. Special Instructions:none. MEDICATIONS TO TAKE THE MORNING OF SURGERY WITH A SIP OF WATER:HCTZ. Zofran if needed. I understand a pre-operative phone call will be made to verify my surgery time. In the event that I am not available, I give permission for a message to be left on my answering service and/or with another person?   {yes @ [phone].         ___________________      __________   _________    (Signature of Patient)             (Witness)                (Date and Time)

## 2019-04-15 ENCOUNTER — TELEPHONE (OUTPATIENT)
Dept: SURGERY | Age: 59
End: 2019-04-15

## 2019-04-15 ENCOUNTER — HOSPITAL ENCOUNTER (OUTPATIENT)
Age: 59
Setting detail: OUTPATIENT SURGERY
Discharge: HOME | End: 2019-04-15
Attending: SURGERY | Admitting: SURGERY
Payer: MEDICAID

## 2019-04-15 ENCOUNTER — ANESTHESIA (OUTPATIENT)
Dept: SURGERY | Age: 59
End: 2019-04-15
Payer: MEDICAID

## 2019-04-15 ENCOUNTER — ANESTHESIA EVENT (OUTPATIENT)
Dept: SURGERY | Age: 59
End: 2019-04-15
Payer: MEDICAID

## 2019-04-15 VITALS
BODY MASS INDEX: 37.65 KG/M2 | HEIGHT: 65 IN | HEART RATE: 69 BPM | TEMPERATURE: 98.1 F | OXYGEN SATURATION: 96 % | RESPIRATION RATE: 18 BRPM | SYSTOLIC BLOOD PRESSURE: 126 MMHG | WEIGHT: 225.97 LBS | DIASTOLIC BLOOD PRESSURE: 49 MMHG

## 2019-04-15 DIAGNOSIS — N60.02 BREAST CYST, LEFT: Primary | ICD-10-CM

## 2019-04-15 LAB
ANION GAP BLD CALC-SCNC: 18 MMOL/L (ref 10–20)
BUN BLD-MCNC: 14 MG/DL (ref 9–20)
CA-I BLD-MCNC: 1.2 MMOL/L (ref 1.12–1.32)
CHLORIDE BLD-SCNC: 106 MMOL/L (ref 98–107)
CO2 BLD-SCNC: 22 MMOL/L (ref 21–32)
CREAT BLD-MCNC: 0.4 MG/DL (ref 0.6–1.3)
GLUCOSE BLD STRIP.AUTO-MCNC: 169 MG/DL (ref 65–100)
GLUCOSE BLD-MCNC: 163 MG/DL (ref 65–100)
HCT VFR BLD CALC: 42 % (ref 35–47)
POTASSIUM BLD-SCNC: 4.1 MMOL/L (ref 3.5–5.1)
SERVICE CMNT-IMP: ABNORMAL
SERVICE CMNT-IMP: ABNORMAL
SODIUM BLD-SCNC: 140 MMOL/L (ref 136–145)

## 2019-04-15 PROCEDURE — 76210000063 HC OR PH I REC FIRST 0.5 HR: Performed by: SURGERY

## 2019-04-15 PROCEDURE — 74011000250 HC RX REV CODE- 250: Performed by: SURGERY

## 2019-04-15 PROCEDURE — 77030010509 HC AIRWY LMA MSK TELE -A: Performed by: ANESTHESIOLOGY

## 2019-04-15 PROCEDURE — 88305 TISSUE EXAM BY PATHOLOGIST: CPT

## 2019-04-15 PROCEDURE — 74011250636 HC RX REV CODE- 250/636

## 2019-04-15 PROCEDURE — 77030011640 HC PAD GRND REM COVD -A: Performed by: SURGERY

## 2019-04-15 PROCEDURE — 74011250636 HC RX REV CODE- 250/636: Performed by: SURGERY

## 2019-04-15 PROCEDURE — 76010000138 HC OR TIME 0.5 TO 1 HR: Performed by: SURGERY

## 2019-04-15 PROCEDURE — 88304 TISSUE EXAM BY PATHOLOGIST: CPT

## 2019-04-15 PROCEDURE — 77030031139 HC SUT VCRL2 J&J -A: Performed by: SURGERY

## 2019-04-15 PROCEDURE — 76210000021 HC REC RM PH II 0.5 TO 1 HR: Performed by: SURGERY

## 2019-04-15 PROCEDURE — 77030032490 HC SLV COMPR SCD KNE COVD -B: Performed by: SURGERY

## 2019-04-15 PROCEDURE — 74011000250 HC RX REV CODE- 250

## 2019-04-15 PROCEDURE — 77030018836 HC SOL IRR NACL ICUM -A: Performed by: SURGERY

## 2019-04-15 PROCEDURE — 77030039266 HC ADH SKN EXOFIN S2SG -A: Performed by: SURGERY

## 2019-04-15 PROCEDURE — 74011250636 HC RX REV CODE- 250/636: Performed by: ANESTHESIOLOGY

## 2019-04-15 PROCEDURE — 82962 GLUCOSE BLOOD TEST: CPT

## 2019-04-15 PROCEDURE — 74011250637 HC RX REV CODE- 250/637: Performed by: ANESTHESIOLOGY

## 2019-04-15 PROCEDURE — 80047 BASIC METABLC PNL IONIZED CA: CPT

## 2019-04-15 PROCEDURE — 77030020782 HC GWN BAIR PAWS FLX 3M -B

## 2019-04-15 PROCEDURE — 76060000032 HC ANESTHESIA 0.5 TO 1 HR: Performed by: SURGERY

## 2019-04-15 RX ORDER — SODIUM CHLORIDE, SODIUM LACTATE, POTASSIUM CHLORIDE, CALCIUM CHLORIDE 600; 310; 30; 20 MG/100ML; MG/100ML; MG/100ML; MG/100ML
25 INJECTION, SOLUTION INTRAVENOUS CONTINUOUS
Status: DISCONTINUED | OUTPATIENT
Start: 2019-04-15 | End: 2019-04-15 | Stop reason: HOSPADM

## 2019-04-15 RX ORDER — HYDROCODONE BITARTRATE AND ACETAMINOPHEN 5; 325 MG/1; MG/1
1 TABLET ORAL
Qty: 30 TAB | Refills: 0 | Status: SHIPPED | OUTPATIENT
Start: 2019-04-15 | End: 2019-04-18

## 2019-04-15 RX ORDER — ONDANSETRON 2 MG/ML
4 INJECTION INTRAMUSCULAR; INTRAVENOUS AS NEEDED
Status: CANCELLED | OUTPATIENT
Start: 2019-04-15

## 2019-04-15 RX ORDER — MIDAZOLAM HYDROCHLORIDE 1 MG/ML
1 INJECTION, SOLUTION INTRAMUSCULAR; INTRAVENOUS AS NEEDED
Status: DISCONTINUED | OUTPATIENT
Start: 2019-04-15 | End: 2019-04-15 | Stop reason: HOSPADM

## 2019-04-15 RX ORDER — CEFAZOLIN SODIUM/WATER 2 G/20 ML
2 SYRINGE (ML) INTRAVENOUS
Status: COMPLETED | OUTPATIENT
Start: 2019-04-15 | End: 2019-04-15

## 2019-04-15 RX ORDER — PROPOFOL 10 MG/ML
INJECTION, EMULSION INTRAVENOUS AS NEEDED
Status: DISCONTINUED | OUTPATIENT
Start: 2019-04-15 | End: 2019-04-15 | Stop reason: HOSPADM

## 2019-04-15 RX ORDER — BUPIVACAINE HYDROCHLORIDE AND EPINEPHRINE 2.5; 5 MG/ML; UG/ML
INJECTION, SOLUTION EPIDURAL; INFILTRATION; INTRACAUDAL; PERINEURAL AS NEEDED
Status: DISCONTINUED | OUTPATIENT
Start: 2019-04-15 | End: 2019-04-15 | Stop reason: HOSPADM

## 2019-04-15 RX ORDER — FENTANYL CITRATE 50 UG/ML
25 INJECTION, SOLUTION INTRAMUSCULAR; INTRAVENOUS
Status: CANCELLED | OUTPATIENT
Start: 2019-04-15

## 2019-04-15 RX ORDER — LIDOCAINE HYDROCHLORIDE 10 MG/ML
0.1 INJECTION, SOLUTION EPIDURAL; INFILTRATION; INTRACAUDAL; PERINEURAL AS NEEDED
Status: DISCONTINUED | OUTPATIENT
Start: 2019-04-15 | End: 2019-04-15 | Stop reason: HOSPADM

## 2019-04-15 RX ORDER — ACETAMINOPHEN 325 MG/1
650 TABLET ORAL ONCE
Status: COMPLETED | OUTPATIENT
Start: 2019-04-15 | End: 2019-04-15

## 2019-04-15 RX ORDER — FENTANYL CITRATE 50 UG/ML
50 INJECTION, SOLUTION INTRAMUSCULAR; INTRAVENOUS AS NEEDED
Status: DISCONTINUED | OUTPATIENT
Start: 2019-04-15 | End: 2019-04-15 | Stop reason: HOSPADM

## 2019-04-15 RX ORDER — SODIUM CHLORIDE, SODIUM LACTATE, POTASSIUM CHLORIDE, CALCIUM CHLORIDE 600; 310; 30; 20 MG/100ML; MG/100ML; MG/100ML; MG/100ML
25 INJECTION, SOLUTION INTRAVENOUS CONTINUOUS
Status: CANCELLED | OUTPATIENT
Start: 2019-04-15

## 2019-04-15 RX ORDER — MORPHINE SULFATE 10 MG/ML
2 INJECTION, SOLUTION INTRAMUSCULAR; INTRAVENOUS
Status: CANCELLED | OUTPATIENT
Start: 2019-04-15 | End: 2019-04-15

## 2019-04-15 RX ORDER — KETAMINE HYDROCHLORIDE 50 MG/ML
INJECTION, SOLUTION INTRAMUSCULAR; INTRAVENOUS AS NEEDED
Status: DISCONTINUED | OUTPATIENT
Start: 2019-04-15 | End: 2019-04-15 | Stop reason: HOSPADM

## 2019-04-15 RX ADMIN — KETAMINE HYDROCHLORIDE 50 MG: 50 INJECTION, SOLUTION INTRAMUSCULAR; INTRAVENOUS at 08:36

## 2019-04-15 RX ADMIN — PROPOFOL 50 MG: 10 INJECTION, EMULSION INTRAVENOUS at 08:41

## 2019-04-15 RX ADMIN — SODIUM CHLORIDE, SODIUM LACTATE, POTASSIUM CHLORIDE, AND CALCIUM CHLORIDE 25 ML/HR: 600; 310; 30; 20 INJECTION, SOLUTION INTRAVENOUS at 07:42

## 2019-04-15 RX ADMIN — Medication 2 G: at 08:36

## 2019-04-15 RX ADMIN — ACETAMINOPHEN 650 MG: 325 TABLET ORAL at 07:44

## 2019-04-15 NOTE — ANESTHESIA PREPROCEDURE EVALUATION
Relevant Problems No relevant active problems Anesthetic History Increased risk of difficult airway Review of Systems / Medical History Patient summary reviewed, nursing notes reviewed and pertinent labs reviewed Pulmonary Sleep apnea: No treatment Smoker Neuro/Psych Within defined limits Cardiovascular Hypertension Hyperlipidemia Exercise tolerance: >4 METS 
  
GI/Hepatic/Renal 
  
GERD Renal disease: stones Endo/Other Diabetes: type 2, using insulin Morbid obesity Other Findings Physical Exam 
 
Airway Mallampati: III 
TM Distance: < 4 cm Neck ROM: normal range of motion Mouth opening: Normal 
 
 Cardiovascular Regular rate and rhythm,  S1 and S2 normal,  no murmur, click, rub, or gallop Dental 
No notable dental hx Pulmonary Breath sounds clear to auscultation Abdominal 
GI exam deferred Other Findings Anesthetic Plan ASA: 3 Anesthesia type: total IV anesthesia and MAC Induction: Intravenous Anesthetic plan and risks discussed with: Patient

## 2019-04-15 NOTE — PERIOP NOTES
Bedside shift change report given to Ian 52 (oncoming nurse) by Juan Daniel RN (offgoing nurse). Report included the following information SBAR.

## 2019-04-15 NOTE — PROGRESS NOTES
Patient discharged to home. Iv removed. Discharge instructions, scripts, and  Medication education done with patient and spouse.

## 2019-04-15 NOTE — ANESTHESIA POSTPROCEDURE EVALUATION
Procedure(s): EXCISION LEFT BREAST CYST AND EXCISION OF LEFT UPPER ARM LIPOMA. 
 
total IV anesthesia, MAC Anesthesia Post Evaluation Patient location during evaluation: PACU Note status: Adequate. Level of consciousness: responsive to verbal stimuli and sleepy but conscious Pain management: satisfactory to patient Airway patency: patent Anesthetic complications: no 
Cardiovascular status: acceptable Respiratory status: acceptable Hydration status: acceptable Comments: +Post-Anesthesia Evaluation and Assessment Patient: Debra Molina MRN: [id number]  SSN: [id number] YOB: 1960  Age: 63 y.o. Sex: female Cardiovascular Function/Vital Signs /65 (BP 1 Location: Left arm, BP Patient Position: At rest)   Pulse 71   Temp 36.9 °C (98.4 °F)   Resp 23   Ht 5' 5\" (1.651 m)   Wt 102.5 kg (225 lb 15.5 oz)   SpO2 94%   BMI 37.60 kg/m² Patient is status post Procedure(s): EXCISION LEFT BREAST CYST AND EXCISION OF LEFT UPPER ARM LIPOMA. Nausea/Vomiting: Controlled. Postoperative hydration reviewed and adequate. Pain: 
Pain Scale 1: Numeric (0 - 10) (04/15/19 0930) Pain Intensity 1: 0 (04/15/19 0930) Managed. Neurological Status:  
Neuro (WDL): Within Defined Limits (04/15/19 0914) At baseline. Mental Status and Level of Consciousness: Arousable. Pulmonary Status:  
O2 Device: Room air (04/15/19 0930) Adequate oxygenation and airway patent. Complications related to anesthesia: None Post-anesthesia assessment completed. No concerns. Signed By: Kevyn Meadows MD  
 4/15/2019 Post anesthesia nausea and vomiting:  controlled Vitals Value Taken Time /65 4/15/2019  9:30 AM  
Temp 36.9 °C (98.4 °F) 4/15/2019  9:30 AM  
Pulse 68 4/15/2019  9:31 AM  
Resp 20 4/15/2019  9:31 AM  
SpO2 95 % 4/15/2019  9:31 AM  
Vitals shown include unvalidated device data.

## 2019-04-15 NOTE — OP NOTES
Patient ID:   Name: Galindo Guidry   Medical Record Number: [id number]   YOB: 1960    Date of Surgery: 4/15/2019     Preoperative Diagnosis:    1. Left breast sebaceous cyst  2. Left upper arm lipoma    Postoperative Diagnosis: Same as preoperative diagnosis. Procedures:    1. Excision of left breast cyst  2. Excision of left upper arm lipoma    Surgeon: Lucinda Piña MD     Surgical assistant:  Erika Teague    Anesthesia:  MAC    Estimated Blood Loss:   5 cc    Implants:  None    Specimens:   1. Left breast cyst  2. Left upper arm lipoma    Complication: None    Indications: Patient 59 y.o.  female with left breast cyst and left upper arm lipoma presents today for excision. Procedure Details: The patient was seen in the Holding Room. The risks, benefits, complications, treatment options, and expected outcomes were discussed with the patient. After informed consent was performed, patient was taken to the operating room and was placed supine in the operating table. After establishing MAC anesthesia, left breast and left upper arm were prepped and draped in standard surgical fashion. After local, a 3 cm elliptical incision was made around the well healed cyst on her left breast measuring approximately 1 x 0.3 cm and the cyst was excised using combination of electrocautery and sharp dissection. There was no spillage of the cyst content. Specimen was sent off to the pathology. Wound was irrigated and good hemostasis was obtained. Incision was then closed in two layers. Subcutaneous layer was approximated using interrupted 3-0 Vicryl and skin was closed with 4-0 Vicryl subcuticular stitch. Similarly, after local, a 2 cm incision was made over the palpable 1.5 x 1.5 cm mobile rubbery mass in the left upper arm and a fatty tumor was excised. Wound was irrigated and good hemostasis was obtained. Incision was then closed in two layers.   Subcutaneous layer was approximated using interrupted 3-0 Vicryl and skin was closed with 4-0 Vicryl subcuticular stitch. Dermabond was then applied to both incisions. Instrument, sponge, and needle counts were correct prior to closure and at the conclusion of the case. The patient was taken to recovery room in good condition having tolerated the procedure well.       CC: Pee Ordaz MD

## 2019-04-15 NOTE — DISCHARGE INSTRUCTIONS
Patient Discharge Instructions    Meliton Favre / [id number] : 1960    Admitted 4/15/2019 Discharged: 4/15/2019         What to do at Home    Recommended diet: Regular Diet    Recommended activity: no strenuous exercises x 2 weeks; no driving while taking narcotics for pain. May take shower, but no bath x 2 weeks. Keep ice over the incision to minimize swelling. Please take over the counter stool softener or miralax while taking narcotics for pain. If you experience a lot of drainage, develop redness around the wound, or a fever over 101 F occurs please call the office. Narcotics and anesthesia sometimes cause nausea and vomiting. If persistent please call the office. Do not hesitate to call with questions or concerns. Follow-up with Dr. Rocio Beavers in 2 weeks. Please call [phone] for an appointment. Information obtained by :  I understand that if any problems occur once I am at home I am to contact my physician. I understand and acknowledge receipt of the instructions indicated above. Physician's or R.N.'s Signature                                                                  Date/Time                                                                                                                                              Patient or Representative Signature                                                          Date/Time    TO PREVENT AN INFECTION      1. [address] YOUR HANDS     To prevent infection, good handwashing is the most important thing you or your caregiver can do.  Wash your hands with soap and water or use the hand  we gave you before you touch any wounds. 2. SHOWER     Use the antibacterial soap we gave you when you take a shower.  Shower with this soap until your wounds are healed.        To reach all areas of your body, you may need someone to help you.  Dont forget to clean your belly button with every shower. 3.  USE CLEAN SHEETS     Use freshly cleaned sheets on your bed after surgery.  To keep the surgery site clean, do not allow pets to sleep with you while your wound is still healing. 4. STOP SMOKING     Stop smoking, or at least cut back on smoking     Smoking slows your healing. 5.  CONTROL YOUR BLOOD SUGAR     High blood sugars slow wound healing. If you are diabetic, control your blood sugar levels before and after your surgery. How to Care for Your Wound After Its Treated With  DERMABOND* Topical Skin Adhesive  DERMABOND* Topical Skin Adhesive (2-octyl cyanoacrylate) is a sterile, liquid skin adhesive  that holds wound edges together. The film will usually remain in place for 5 to 10 days, then  naturally fall off your skin. The following will answer some of your questions and provide instructions for proper care for your  wound while it is healing:    CHECK WOUND APPEARANCE   Some swelling, redness, and pain are common with all wounds and normally will go away as the  wound heals. If swelling, redness, or pain increases or if the wound feels warm to the touch,  contact a doctor. Also contact a doctor if the wound edges reopen or separate. REPLACE BANDAGES   If your wound is bandaged, keep the bandage dry.  Replace the dressing daily until the adhesive film has fallen off or if the  bandage should become wet, unless otherwise instructed by your  physician.  When changing the dressing, do not place tape directly over the  DERMABOND adhesive film, because removing the tape later may also  remove the film. AVOID TOPICAL MEDICATIONS   Do not apply liquid or ointment medications or any other product to your wound while the  DERMABOND adhesive film is in place. These may loosen the film before your wound is healed.   KEEP WOUND DRY AND PROTECTED   You may occasionally and briefly wet your wound in the shower or bath. Do not soak or scrub  your wound, do not swim, and avoid periods of heavy perspiration until the DERMABOND  adhesive has naturally fallen off. After showering or bathing, gently blot your wound dry with a  soft towel. If a protective dressing is being used, apply a fresh, dry bandage, being sure to keep  the tape off the DERMABOND adhesive film.  Apply a clean, dry bandage over the wound if necessary to protect it.  Protect your wound from injury until the skin has had sufficient time to heal.   Do not scratch, rub, or pick at the DERMABOND adhesive film. This may loosen the film before  your wound is healed.  Protect the wound from prolonged exposure to sunlight or tanning lamps while the film is in  place. If you have any questions or concerns about this product, please consult your doctor. *Trademark ©[employer] 2002DISCHARGE SUMMARY from Nurse    The following personal items are in your possession at time of discharge:    Dental Appliances: None  Visual Aid: None  Home Medications: None  Jewelry: None  Clothing: None (left in in pt. room)  Other Valuables: None             PATIENT INSTRUCTIONS:    After general anesthesia or intravenous sedation, for 24 hours or while taking prescription Narcotics:  Limit your activities  Do not drive and operate hazardous machinery  Do not make important personal or business decisions  Do  not drink alcoholic beverages  If you have not urinated within 8 hours after discharge, please contact your surgeon on call.     Report the following to your surgeon:  Excessive pain, swelling, redness or odor of or around the surgical area  Temperature over 100.5  Nausea and vomiting lasting longer than 4 hours or if unable to take medications  Any signs of decreased circulation or nerve impairment to extremity: change in color, persistent  numbness, tingling, coldness or increase pain  Any questions    To prevent infection remember to refer to your handout on handwashing given to you by your nurse. What to do at Home:  Recommended activity: Activity as tolerated,     If you have surgical incisions you may shower. Please do not scrub incision, let water flow over area and pat dry. Do not tub bathe, get in a hot tub or swim; until your wounds have healed and given the OK by your surgeon to do so. *  Please give a list of your current medications to your Primary Care Provider. *  Please update this list whenever your medications are discontinued, doses are      changed, or new medications (including over-the-counter products) are added. *  Please carry medication information at all times in case of emergency situations. These are general instructions for a healthy lifestyle:    No smoking/ No tobacco products/ Avoid exposure to second hand smoke    Surgeon General's Warning:  Quitting smoking now greatly reduces serious risk to your health. Obesity, smoking, and sedentary lifestyle greatly increases your risk for illness    A healthy diet, regular physical exercise & weight monitoring are important for maintaining a healthy lifestyle    You may be retaining fluid if you have a history of heart failure or if you experience any of the following symptoms:  Weight gain of 3 pounds or more overnight or 5 pounds in a week, increased swelling in our hands or feet or shortness of breath while lying flat in bed. Please call your doctor as soon as you notice any of these symptoms; do not wait until your next office visit. Recognize signs and symptoms of STROKE:    F-face looks uneven    A-arms unable to move or move unevenly    S-speech slurred or non-existent    T-time-call 911 as soon as signs and symptoms begin-DO NOT go       Back to bed or wait to see if you get better-TIME IS BRAIN. The discharge information has been reviewed with the patient.   The patient verbalized understanding. Patient Education      Hydrocodone/Acetaminophen (Vicodin, Norco, Lortab) - (By mouth)   Why this medicine is used:   Treats pain. Contact a nurse or doctor right away if you have:  Blistering, peeling, red skin rash  Fast or slow heartbeat, shallow breathing, blue lips, fingernails, or skin  Anxiety, restlessness, muscle spasms, twitching, seeing or hearing things that are not there  Dark urine or pale stools, yellow skin or eyes  Extreme weakness, sweating, seizures, cold or clammy skin  Lightheadedness, dizziness, fainting, fever, sweating     Common side effects:  Constipation, nausea, vomiting, loss of appetite, stomach pain  Tiredness or sleepiness  © 2017 [facility] Information is for End User's use only and may not be sold, redistributed or otherwise used for commercial purposes.

## 2019-04-15 NOTE — H&P
Surgery H&P     Subjective:   Patient 63 y.o.  female s/p I&D of left breast abscess on 2/27/19 presents today for follow up. Currently doing wound care daily and noticed some waxy material drain from the wound. No drainage. Patient also complains of tender nodule in her left upper arm that she had for 4 months. It's increasing in size. Denies any skin changes overlying the lump. No recent trauma.     Past Medical & Surgical History:       Past Medical History:   Diagnosis Date    Calculus of kidney      DM (diabetes mellitus) ([facility] [address]      Headache      Sleep apnea              Past Surgical History:   Procedure Laterality Date    HX GYN         OVERY REMOVED    HX OTHER SURGICAL   2010     KIDNEY STONES REMOVED         Social History:  Social History            Socioeconomic History    Marital status:        Spouse name: Not on file    Number of children: Not on file    Years of education: Not on file    Highest education level: Not on file   Social Needs    Financial resource strain: Not on file    Food insecurity - worry: Not on file    Food insecurity - inability: Not on file   [employer] needs - medical: Not on file   [employer] needs - non-medical: Not on file   Occupational History    Not on file   Tobacco Use    Smoking status: Current Every Day Smoker       Packs/day: 0.25    Smokeless tobacco: Never Used   Substance and Sexual Activity    Alcohol use: No    Drug use: No    Sexual activity: Not on file   Other Topics Concern    Not on file   Social History Narrative    Not on file         Family History:  History reviewed. No pertinent family history.      Medications:       Current Outpatient Medications   Medication Sig    glipiZIDE (GLUCOTROL) 5 mg tablet Take  by mouth daily.  metFORMIN (GLUCOPHAGE) 500 mg tablet Take  by mouth two (2) times daily (with meals).     insulin glargine (LANTUS SOLOSTAR U-100 INSULIN) 100 unit/mL (3 mL) inpn 20 units sq qhs (Patient taking differently: 26 units sq qhs)    Lancets misc Check BG 4x/day before meals and bedtime (Patient taking differently: two (2) times a day. Check BG 4x/day before meals and bedtime)    glucose blood VI test strips (CONTOUR NEXT TEST STRIPS) strip As directed (Patient taking differently: two (2) times a day. As directed)    hydroCHLOROthiazide (HYDRODIURIL) 25 mg tablet Take 25 mg by mouth daily.  clopidogrel bisulfate (CLOPIDOGREL PO) Take  by mouth daily.  clindamycin (CLEOCIN) 300 mg capsule Take 1 Cap by mouth four (4) times daily.  HYDROcodone-acetaminophen (NORCO) 5-325 mg per tablet Take 1 Tab by mouth every four (4) hours as needed for Pain. Max Daily Amount: 6 Tabs.  ondansetron (ZOFRAN ODT) 4 mg disintegrating tablet Take 1 Tab by mouth every eight (8) hours as needed for Nausea.      No current facility-administered medications for this visit.          Allergies:  No Known Allergies     Review of Systems  A comprehensive review of systems was negative except for that written in the HPI.     Objective:      Exam:     Visit Vitals  /83 (BP 1 Location: Right arm, BP Patient Position: Sitting)   Pulse 66   Temp 97.4 °F (36.3 °C)   Resp 20   Ht 5' 4\" (1.626 m)   Wt 103.9 kg (229 lb)   SpO2 97%   BMI 39.31 kg/m²      General appearance: alert, cooperative, no distress, appears stated age  Extremities: extremities normal, atraumatic, no cyanosis or edema. Skin: Skin color, texture, turgor normal. Left breast wound clean and measures 1 x 0.3 cm. Neosporin applied. No erythema or drainage. Left upper inner arm with 1.5 x 1.5 cm rubbery mobile nodule. Mildly tender. No erythema or skin changes overlying the lump.   Neurologic: Grossly normal     Assessment:      S/p I&D  Left breast abscess  Left upper arm lipoma     Plan:      Excision of left breast cyst and left upper arm lipoma when wound heals  Risks, benefit, and alternative to surgery was discussed with the patient. The risks of surgery include but not limited to infection, bleeding, recurrence, nerve injury, and the risks of anesthetic. Patient is agreeable to surgery.   All questions answered.

## 2019-04-15 NOTE — PERIOP NOTES
TRANSFER - OUT REPORT:    Verbal report given to Jony Krishnan RN(name) on Ira Butt  being transferred to Phase II (unit) for routine progression of care       Report consisted of patients Situation, Background, Assessment and   Recommendations(SBAR). Information from the following report(s) SBAR, OR Summary, Intake/Output, MAR, Recent Results and Cardiac Rhythm NSR was reviewed with the receiving nurse. Opportunity for questions and clarification was provided.       Patient transported with:   Registered Nurse

## 2019-04-15 NOTE — PERIOP NOTES
8966: Handoff Report from Operating Room to PACU    Report received from [id number], RN and Ulisses Young CRNA regarding Jennifer Wills. Surgeon(s):  Aramis Allen MD  And Procedure(s) (LRB):  EXCISION LEFT BREAST CYST AND EXCISION OF LEFT UPPER ARM LIPOMA (Left)  confirmed   with allergies and dressings discussed. Anesthesia type, drugs, patient history, complications, estimated blood loss, vital signs, intake and output, and last pain medication, lines and temperature were reviewed. 3913: Report given to Yuridia Hernandez RN to assume care of patient.

## 2019-04-15 NOTE — TELEPHONE ENCOUNTER
Patient is calling because her throat is really bothering her. Patient states it's very sensitive to touch. Patient had surgery this morning. Please advise.

## 2019-04-29 ENCOUNTER — OFFICE VISIT (OUTPATIENT)
Dept: SURGERY | Age: 59
End: 2019-04-29

## 2019-04-29 VITALS
RESPIRATION RATE: 16 BRPM | WEIGHT: 229 LBS | TEMPERATURE: 98.6 F | SYSTOLIC BLOOD PRESSURE: 143 MMHG | BODY MASS INDEX: 38.15 KG/M2 | HEIGHT: 65 IN | DIASTOLIC BLOOD PRESSURE: 85 MMHG | HEART RATE: 72 BPM | OXYGEN SATURATION: 99 %

## 2019-04-29 DIAGNOSIS — Z09 POSTOPERATIVE EXAMINATION: Primary | ICD-10-CM

## 2019-04-29 NOTE — PROGRESS NOTES
Patient is here for follow-up. Patient underwent Excision of left breast cyst and LUE lipoma on 4/15/19. Doing well. No complaints. Path reviewed with the patient. PE:  Visit Vitals  /85 (BP 1 Location: Left arm, BP Patient Position: Sitting)   Pulse 72   Temp 98.6 °F (37 °C)   Resp 16   Ht 5' 5\" (1.651 m)   Wt 103.9 kg (229 lb)   LMP  (LMP Unknown)   SpO2 99%   BMI 38.11 kg/m²     Skin:  Left breast and LUE Inc C/D/I. No erythema or drainage.     Assessment:  S/p Excision of left breast cyst and LUE lipoma     Plan:  -f/u prn

## 2020-05-17 ENCOUNTER — APPOINTMENT (OUTPATIENT)
Dept: GENERAL RADIOLOGY | Age: 60
DRG: 175 | End: 2020-05-17
Attending: EMERGENCY MEDICINE
Payer: MEDICAID

## 2020-05-17 ENCOUNTER — HOSPITAL ENCOUNTER (INPATIENT)
Age: 60
LOS: 3 days | Discharge: HOME | DRG: 175 | End: 2020-05-20
Attending: EMERGENCY MEDICINE | Admitting: INTERNAL MEDICINE
Payer: MEDICAID

## 2020-05-17 DIAGNOSIS — I21.4 NSTEMI (NON-ST ELEVATED MYOCARDIAL INFARCTION) (HCC): Primary | ICD-10-CM

## 2020-05-17 LAB
ALBUMIN SERPL-MCNC: 3.5 G/DL (ref 3.5–5)
ALBUMIN/GLOB SERPL: 0.9 {RATIO} (ref 1.1–2.2)
ALP SERPL-CCNC: 69 U/L (ref 45–117)
ALT SERPL-CCNC: 26 U/L (ref 12–78)
ANION GAP SERPL CALC-SCNC: 8 MMOL/L (ref 5–15)
AST SERPL-CCNC: 17 U/L (ref 15–37)
BASOPHILS # BLD: 0 K/UL (ref 0–0.1)
BASOPHILS NFR BLD: 0 % (ref 0–1)
BILIRUB SERPL-MCNC: 0.3 MG/DL (ref 0.2–1)
BUN SERPL-MCNC: 17 MG/DL (ref 6–20)
BUN/CREAT SERPL: 23 (ref 12–20)
CALCIUM SERPL-MCNC: 8 MG/DL (ref 8.5–10.1)
CHLORIDE SERPL-SCNC: 107 MMOL/L (ref 97–108)
CK SERPL-CCNC: 92 U/L (ref 26–192)
CO2 SERPL-SCNC: 25 MMOL/L (ref 21–32)
CREAT SERPL-MCNC: 0.74 MG/DL (ref 0.55–1.02)
DIFFERENTIAL METHOD BLD: NORMAL
EOSINOPHIL # BLD: 0.2 K/UL (ref 0–0.4)
EOSINOPHIL NFR BLD: 2 % (ref 0–7)
ERYTHROCYTE [DISTWIDTH] IN BLOOD BY AUTOMATED COUNT: 14.3 % (ref 11.5–14.5)
GLOBULIN SER CALC-MCNC: 4.1 G/DL (ref 2–4)
GLUCOSE BLD STRIP.AUTO-MCNC: 163 MG/DL (ref 65–100)
GLUCOSE SERPL-MCNC: 132 MG/DL (ref 65–100)
HCT VFR BLD AUTO: 40.3 % (ref 35–47)
HGB BLD-MCNC: 13.5 G/DL (ref 11.5–16)
IMM GRANULOCYTES # BLD AUTO: 0 K/UL (ref 0–0.04)
IMM GRANULOCYTES NFR BLD AUTO: 0 % (ref 0–0.5)
LYMPHOCYTES # BLD: 2.7 K/UL (ref 0.8–3.5)
LYMPHOCYTES NFR BLD: 30 % (ref 12–49)
MCH RBC QN AUTO: 29 PG (ref 26–34)
MCHC RBC AUTO-ENTMCNC: 33.5 G/DL (ref 30–36.5)
MCV RBC AUTO: 86.5 FL (ref 80–99)
MONOCYTES # BLD: 0.5 K/UL (ref 0–1)
MONOCYTES NFR BLD: 6 % (ref 5–13)
NEUTS SEG # BLD: 5.7 K/UL (ref 1.8–8)
NEUTS SEG NFR BLD: 62 % (ref 32–75)
NRBC # BLD: 0 K/UL (ref 0–0.01)
NRBC BLD-RTO: 0 PER 100 WBC
PLATELET # BLD AUTO: 270 K/UL (ref 150–400)
PMV BLD AUTO: 10.7 FL (ref 8.9–12.9)
POTASSIUM SERPL-SCNC: 2.8 MMOL/L (ref 3.5–5.1)
PROT SERPL-MCNC: 7.6 G/DL (ref 6.4–8.2)
RBC # BLD AUTO: 4.66 M/UL (ref 3.8–5.2)
SERVICE CMNT-IMP: ABNORMAL
SODIUM SERPL-SCNC: 140 MMOL/L (ref 136–145)
TROPONIN I SERPL-MCNC: 0.15 NG/ML
TROPONIN I SERPL-MCNC: 0.17 NG/ML
WBC # BLD AUTO: 9.2 K/UL (ref 3.6–11)

## 2020-05-17 PROCEDURE — 96365 THER/PROPH/DIAG IV INF INIT: CPT

## 2020-05-17 PROCEDURE — 93005 ELECTROCARDIOGRAM TRACING: CPT

## 2020-05-17 PROCEDURE — 74011250636 HC RX REV CODE- 250/636: Performed by: EMERGENCY MEDICINE

## 2020-05-17 PROCEDURE — 82962 GLUCOSE BLOOD TEST: CPT

## 2020-05-17 PROCEDURE — 36415 COLL VENOUS BLD VENIPUNCTURE: CPT

## 2020-05-17 PROCEDURE — 82550 ASSAY OF CK (CPK): CPT

## 2020-05-17 PROCEDURE — 71046 X-RAY EXAM CHEST 2 VIEWS: CPT

## 2020-05-17 PROCEDURE — 85025 COMPLETE CBC W/AUTO DIFF WBC: CPT

## 2020-05-17 PROCEDURE — 65660000000 HC RM CCU STEPDOWN

## 2020-05-17 PROCEDURE — 94762 N-INVAS EAR/PLS OXIMTRY CONT: CPT

## 2020-05-17 PROCEDURE — 74011250637 HC RX REV CODE- 250/637: Performed by: EMERGENCY MEDICINE

## 2020-05-17 PROCEDURE — 96372 THER/PROPH/DIAG INJ SC/IM: CPT

## 2020-05-17 PROCEDURE — 80053 COMPREHEN METABOLIC PANEL: CPT

## 2020-05-17 PROCEDURE — 99285 EMERGENCY DEPT VISIT HI MDM: CPT

## 2020-05-17 PROCEDURE — 84484 ASSAY OF TROPONIN QUANT: CPT

## 2020-05-17 RX ORDER — METFORMIN HYDROCHLORIDE 750 MG/1
750 TABLET, EXTENDED RELEASE ORAL 2 TIMES DAILY
COMMUNITY
End: 2022-02-23

## 2020-05-17 RX ORDER — INSULIN LISPRO 100 [IU]/ML
INJECTION, SOLUTION INTRAVENOUS; SUBCUTANEOUS
Status: DISCONTINUED | OUTPATIENT
Start: 2020-05-17 | End: 2020-05-20 | Stop reason: HOSPADM

## 2020-05-17 RX ORDER — ATORVASTATIN CALCIUM 10 MG/1
10 TABLET, FILM COATED ORAL DAILY
Status: DISCONTINUED | OUTPATIENT
Start: 2020-05-18 | End: 2020-05-17

## 2020-05-17 RX ORDER — ASPIRIN 81 MG/1
TABLET ORAL DAILY
COMMUNITY

## 2020-05-17 RX ORDER — METOPROLOL SUCCINATE 100 MG/1
100 TABLET, EXTENDED RELEASE ORAL DAILY
COMMUNITY

## 2020-05-17 RX ORDER — NITROGLYCERIN 0.4 MG/1
0.4 TABLET SUBLINGUAL
Status: DISCONTINUED | OUTPATIENT
Start: 2020-05-17 | End: 2020-05-20 | Stop reason: HOSPADM

## 2020-05-17 RX ORDER — LISINOPRIL 5 MG/1
5 TABLET ORAL DAILY
Status: DISCONTINUED | OUTPATIENT
Start: 2020-05-18 | End: 2020-05-20 | Stop reason: HOSPADM

## 2020-05-17 RX ORDER — ATORVASTATIN CALCIUM 40 MG/1
40 TABLET, FILM COATED ORAL DAILY
Status: DISCONTINUED | OUTPATIENT
Start: 2020-05-18 | End: 2020-05-20 | Stop reason: HOSPADM

## 2020-05-17 RX ORDER — HYDROCHLOROTHIAZIDE 25 MG/1
25 TABLET ORAL DAILY
Status: DISCONTINUED | OUTPATIENT
Start: 2020-05-18 | End: 2020-05-20 | Stop reason: HOSPADM

## 2020-05-17 RX ORDER — ACETAMINOPHEN 325 MG/1
650 TABLET ORAL
Status: DISCONTINUED | OUTPATIENT
Start: 2020-05-17 | End: 2020-05-20 | Stop reason: HOSPADM

## 2020-05-17 RX ORDER — POTASSIUM CHLORIDE 7.45 MG/ML
10 INJECTION INTRAVENOUS
Status: COMPLETED | OUTPATIENT
Start: 2020-05-17 | End: 2020-05-17

## 2020-05-17 RX ORDER — DEXTROSE 50 % IN WATER (D50W) INTRAVENOUS SYRINGE
12.5-25 AS NEEDED
Status: DISCONTINUED | OUTPATIENT
Start: 2020-05-17 | End: 2020-05-20 | Stop reason: HOSPADM

## 2020-05-17 RX ORDER — ATORVASTATIN CALCIUM 80 MG/1
80 TABLET, FILM COATED ORAL
COMMUNITY

## 2020-05-17 RX ORDER — SODIUM CHLORIDE 0.9 % (FLUSH) 0.9 %
5-40 SYRINGE (ML) INJECTION EVERY 8 HOURS
Status: DISCONTINUED | OUTPATIENT
Start: 2020-05-17 | End: 2020-05-20 | Stop reason: HOSPADM

## 2020-05-17 RX ORDER — MORPHINE SULFATE 10 MG/ML
1 INJECTION, SOLUTION INTRAMUSCULAR; INTRAVENOUS
Status: DISCONTINUED | OUTPATIENT
Start: 2020-05-17 | End: 2020-05-20 | Stop reason: HOSPADM

## 2020-05-17 RX ORDER — POTASSIUM CHLORIDE 750 MG/1
40 TABLET, FILM COATED, EXTENDED RELEASE ORAL
Status: COMPLETED | OUTPATIENT
Start: 2020-05-17 | End: 2020-05-17

## 2020-05-17 RX ORDER — LISINOPRIL 40 MG/1
40 TABLET ORAL DAILY
COMMUNITY

## 2020-05-17 RX ORDER — SODIUM CHLORIDE 0.9 % (FLUSH) 0.9 %
5-40 SYRINGE (ML) INJECTION AS NEEDED
Status: DISCONTINUED | OUTPATIENT
Start: 2020-05-17 | End: 2020-05-20 | Stop reason: HOSPADM

## 2020-05-17 RX ORDER — SPIRONOLACTONE 25 MG/1
25 TABLET ORAL DAILY
COMMUNITY

## 2020-05-17 RX ORDER — ISOSORBIDE MONONITRATE 10 MG/1
10 TABLET ORAL DAILY
Status: DISCONTINUED | OUTPATIENT
Start: 2020-05-18 | End: 2020-05-20 | Stop reason: HOSPADM

## 2020-05-17 RX ORDER — PANTOPRAZOLE SODIUM 40 MG/1
40 TABLET, DELAYED RELEASE ORAL DAILY
COMMUNITY

## 2020-05-17 RX ORDER — METOPROLOL TARTRATE 100 MG/1
100 TABLET ORAL DAILY
COMMUNITY
End: 2020-08-31

## 2020-05-17 RX ORDER — MAGNESIUM SULFATE 100 %
4 CRYSTALS MISCELLANEOUS AS NEEDED
Status: DISCONTINUED | OUTPATIENT
Start: 2020-05-17 | End: 2020-05-20 | Stop reason: HOSPADM

## 2020-05-17 RX ORDER — METOPROLOL TARTRATE 50 MG/1
100 TABLET ORAL DAILY
Status: DISCONTINUED | OUTPATIENT
Start: 2020-05-18 | End: 2020-05-20 | Stop reason: HOSPADM

## 2020-05-17 RX ORDER — ENOXAPARIN SODIUM 100 MG/ML
1 INJECTION SUBCUTANEOUS
Status: COMPLETED | OUTPATIENT
Start: 2020-05-17 | End: 2020-05-17

## 2020-05-17 RX ORDER — AMLODIPINE BESYLATE 10 MG/1
5 TABLET ORAL DAILY
COMMUNITY

## 2020-05-17 RX ORDER — POTASSIUM CHLORIDE 1500 MG/1
60 TABLET, FILM COATED, EXTENDED RELEASE ORAL
Status: DISPENSED | OUTPATIENT
Start: 2020-05-17 | End: 2020-05-18

## 2020-05-17 RX ADMIN — ENOXAPARIN SODIUM 100 MG: 40 INJECTION SUBCUTANEOUS at 20:03

## 2020-05-17 RX ADMIN — POTASSIUM CHLORIDE 10 MEQ: 7.46 INJECTION, SOLUTION INTRAVENOUS at 20:03

## 2020-05-17 RX ADMIN — POTASSIUM CHLORIDE 40 MEQ: 750 TABLET, FILM COATED, EXTENDED RELEASE ORAL at 20:03

## 2020-05-17 NOTE — Clinical Note
Balloon inserted. Balloon inflated using multiple inflations inflation technique. Lesion #1: Pressure = 8 corrie; Duration = 20 sec. Inflation 2: Pressure = 10 corrie; Duration = 20 sec. Inflation 3: Pressure = 10 corrie; Duration = 20 sec. Inflation 4: Pressure = 10 corrie; Duration = 20 sec.

## 2020-05-17 NOTE — Clinical Note
Balloon inserted. Balloon inflated using multiple inflations inflation technique. Lesion #1: Pressure = 12 corrie; Duration = 20 sec. Inflation 2: Pressure = 12 corrie; Duration = 20 sec. Inflation 3: Pressure = 12 corrie; Duration = 20 sec. Inflation 4: Pressure = 12 corrie; Duration = 20 sec. Inflation 5: Pressure = 12 corrie; Duration = 12 sec. Inflation 6: Pressure = 12 corrie; Duration = 16 sec. Inflation 7: Pressure = 12 corrie; Duration = 12 sec.

## 2020-05-17 NOTE — Clinical Note
TRANSFER - OUT REPORT:     Verbal report given to: chelsi hudson. Report consisted of patient's Situation, Background, Assessment and   Recommendations(SBAR). Opportunity for questions and clarification was provided. Patient transported with a Registered Nurse and [address] [place] [address] / PEX Card [facility] [place] [facility]. Patient transported to: [facility].

## 2020-05-17 NOTE — Clinical Note
Stent inserted. Stent deployed. Multiple inflations used. First inflation pressure = 12 corrie; inflation time: 20 sec. Second inflation pressure: 12 corrie; inflation time: 10 sec.

## 2020-05-17 NOTE — Clinical Note
Balloon inserted. Balloon inflated using multiple inflations inflation technique. Lesion #1: Pressure = 10 corrie; Duration = 15 sec. Inflation 2: Pressure = 12 corrie; Duration = 20 sec. Inflation 3: Pressure = 12 corrie; Duration = 20 sec. Inflation 4: Pressure = 12 corrie; Duration = 20 sec. Inflation 5: Pressure = 12 corrie; Duration = 20 sec. Inflation 6: Pressure = 12 corrie; Duration = 20 sec.

## 2020-05-17 NOTE — ED NOTES
1925: Bedside and Verbal shift change report given to [facility] (oncoming nurse) by Neisha Montiel (offgoing nurse). Report included the following information SBAR, Kardex, ED Summary, Intake/Output, Recent Results and Cardiac Rhythm NSR.     2030: PCT at bedside to obtain labs. 2048Abarb Juarez MD at bedside. 2054Meka Nieves MD at bedside. 2119: Patient provided with a meal tray and Diet Coke. 2150: Call completed to patient's  at bedside. Medication reconciliation completed. [id number] Douglass MD at bedside. Inquired about the potassium chloride SR (K-TAB0 60 mEq. Verbal order received to HOLD medication until morning lab results. 1: Nurse paged to bedside. Patient would like to be reconnected. Patient disconnected herself from the monitor to use the restroom. 0250: Patient transferred to ED 20 from ED 34. Patient ambulated to ED 20 with a steady gait. Patient provided with a hospital bed at this time. 0330: DANIEL Lewis at bedside to obtain 0400 labs. Samples submitted to the lab.     0442: Nurse paged to bedside. Patient disconnected from the monitor. Patient ambulated to the restroom unassisted with a steady gait.

## 2020-05-17 NOTE — Clinical Note
Lesion located in the Mid OM 1. Balloon inserted. Balloon inflated using multiple inflations inflation technique. Lesion #1: Pressure = 12 corrie; Duration = 15 sec. Inflation 2: Pressure = 12 corrie; Duration = 15 sec.

## 2020-05-17 NOTE — Clinical Note
Balloon inserted. Balloon inflated using multiple inflations inflation technique. Lesion #1: Pressure = 12 corrie; Duration = 20 sec. Inflation 2: Pressure = 8 corrie; Duration = 12 sec. Inflation 3: Pressure = 8 corrie; Duration = 20 sec. Inflation 4: Pressure = 8 corrie; Duration = 10 sec. Inflation 5: Pressure = 8 corrie; Duration = 8 sec.

## 2020-05-17 NOTE — Clinical Note
Balloon inflated using multiple inflations inflation technique. Lesion #1: Pressure = 8 corrie; Duration = 14 sec. Inflation 2: Pressure = 8 corrie; Duration = 15 sec.

## 2020-05-18 ENCOUNTER — APPOINTMENT (OUTPATIENT)
Dept: NON INVASIVE DIAGNOSTICS | Age: 60
DRG: 175 | End: 2020-05-18
Attending: INTERNAL MEDICINE
Payer: MEDICAID

## 2020-05-18 LAB
ANION GAP SERPL CALC-SCNC: 5 MMOL/L (ref 5–15)
ATRIAL RATE: 55 BPM
ATRIAL RATE: 63 BPM
ATRIAL RATE: 73 BPM
BUN SERPL-MCNC: 15 MG/DL (ref 6–20)
BUN/CREAT SERPL: 23 (ref 12–20)
CALCIUM SERPL-MCNC: 8.2 MG/DL (ref 8.5–10.1)
CALCULATED P AXIS, ECG09: 50 DEGREES
CALCULATED P AXIS, ECG09: 53 DEGREES
CALCULATED P AXIS, ECG09: 54 DEGREES
CALCULATED R AXIS, ECG10: -21 DEGREES
CALCULATED R AXIS, ECG10: -8 DEGREES
CALCULATED T AXIS, ECG11: 27 DEGREES
CALCULATED T AXIS, ECG11: 45 DEGREES
CALCULATED T AXIS, ECG11: 57 DEGREES
CHLORIDE SERPL-SCNC: 109 MMOL/L (ref 97–108)
CHOLEST SERPL-MCNC: 99 MG/DL
CO2 SERPL-SCNC: 26 MMOL/L (ref 21–32)
CREAT SERPL-MCNC: 0.65 MG/DL (ref 0.55–1.02)
DIAGNOSIS, 93000: NORMAL
ERYTHROCYTE [DISTWIDTH] IN BLOOD BY AUTOMATED COUNT: 14.6 % (ref 11.5–14.5)
EST. AVERAGE GLUCOSE BLD GHB EST-MCNC: 157 MG/DL
GLUCOSE BLD STRIP.AUTO-MCNC: 152 MG/DL (ref 65–100)
GLUCOSE BLD STRIP.AUTO-MCNC: 155 MG/DL (ref 65–100)
GLUCOSE BLD STRIP.AUTO-MCNC: 251 MG/DL (ref 65–100)
GLUCOSE SERPL-MCNC: 122 MG/DL (ref 65–100)
HBA1C MFR BLD: 7.1 % (ref 4–5.6)
HCT VFR BLD AUTO: 39.1 % (ref 35–47)
HDLC SERPL-MCNC: 30 MG/DL
HDLC SERPL: 3.3 {RATIO} (ref 0–5)
HGB BLD-MCNC: 13 G/DL (ref 11.5–16)
LDLC SERPL CALC-MCNC: 51.4 MG/DL (ref 0–100)
LIPID PROFILE,FLP: NORMAL
MCH RBC QN AUTO: 29 PG (ref 26–34)
MCHC RBC AUTO-ENTMCNC: 33.2 G/DL (ref 30–36.5)
MCV RBC AUTO: 87.1 FL (ref 80–99)
NRBC # BLD: 0 K/UL (ref 0–0.01)
NRBC BLD-RTO: 0 PER 100 WBC
P-R INTERVAL, ECG05: 148 MS
P-R INTERVAL, ECG05: 154 MS
P-R INTERVAL, ECG05: 202 MS
PLATELET # BLD AUTO: 252 K/UL (ref 150–400)
PMV BLD AUTO: 10.8 FL (ref 8.9–12.9)
POTASSIUM SERPL-SCNC: 3.3 MMOL/L (ref 3.5–5.1)
Q-T INTERVAL, ECG07: 430 MS
Q-T INTERVAL, ECG07: 460 MS
Q-T INTERVAL, ECG07: 464 MS
QRS DURATION, ECG06: 78 MS
QRS DURATION, ECG06: 90 MS
QRS DURATION, ECG06: 94 MS
QTC CALCULATION (BEZET), ECG08: 440 MS
QTC CALCULATION (BEZET), ECG08: 473 MS
QTC CALCULATION (BEZET), ECG08: 474 MS
RBC # BLD AUTO: 4.49 M/UL (ref 3.8–5.2)
SERVICE CMNT-IMP: ABNORMAL
SODIUM SERPL-SCNC: 140 MMOL/L (ref 136–145)
TRIGL SERPL-MCNC: 88 MG/DL (ref ?–150)
TROPONIN I SERPL-MCNC: 0.15 NG/ML
TROPONIN I SERPL-MCNC: 0.16 NG/ML
VENTRICULAR RATE, ECG03: 55 BPM
VENTRICULAR RATE, ECG03: 63 BPM
VENTRICULAR RATE, ECG03: 73 BPM
VLDLC SERPL CALC-MCNC: 17.6 MG/DL
WBC # BLD AUTO: 8.8 K/UL (ref 3.6–11)

## 2020-05-18 PROCEDURE — 83036 HEMOGLOBIN GLYCOSYLATED A1C: CPT

## 2020-05-18 PROCEDURE — 74011636637 HC RX REV CODE- 636/637: Performed by: INTERNAL MEDICINE

## 2020-05-18 PROCEDURE — 85027 COMPLETE CBC AUTOMATED: CPT

## 2020-05-18 PROCEDURE — 80061 LIPID PANEL: CPT

## 2020-05-18 PROCEDURE — 74011250637 HC RX REV CODE- 250/637: Performed by: INTERNAL MEDICINE

## 2020-05-18 PROCEDURE — 65660000000 HC RM CCU STEPDOWN

## 2020-05-18 PROCEDURE — 82962 GLUCOSE BLOOD TEST: CPT

## 2020-05-18 PROCEDURE — 36415 COLL VENOUS BLD VENIPUNCTURE: CPT

## 2020-05-18 PROCEDURE — 84484 ASSAY OF TROPONIN QUANT: CPT

## 2020-05-18 PROCEDURE — 80048 BASIC METABOLIC PNL TOTAL CA: CPT

## 2020-05-18 RX ORDER — GUAIFENESIN 100 MG/5ML
81 LIQUID (ML) ORAL DAILY
Status: DISCONTINUED | OUTPATIENT
Start: 2020-05-18 | End: 2020-05-20 | Stop reason: HOSPADM

## 2020-05-18 RX ORDER — BUTALBITAL, ACETAMINOPHEN AND CAFFEINE 50; 325; 40 MG/1; MG/1; MG/1
1 TABLET ORAL
Status: DISCONTINUED | OUTPATIENT
Start: 2020-05-18 | End: 2020-05-20 | Stop reason: HOSPADM

## 2020-05-18 RX ORDER — SODIUM CHLORIDE 0.9 % (FLUSH) 0.9 %
SYRINGE (ML) INJECTION
Status: DISPENSED
Start: 2020-05-18 | End: 2020-05-19

## 2020-05-18 RX ADMIN — Medication 10 ML: at 22:29

## 2020-05-18 RX ADMIN — INSULIN LISPRO 2 UNITS: 100 INJECTION, SOLUTION INTRAVENOUS; SUBCUTANEOUS at 22:29

## 2020-05-18 RX ADMIN — LISINOPRIL 5 MG: 5 TABLET ORAL at 13:15

## 2020-05-18 RX ADMIN — BUTALBITAL, ACETAMINOPHEN AND CAFFEINE 1 TABLET: 50; 325; 40 TABLET ORAL at 16:15

## 2020-05-18 RX ADMIN — TICAGRELOR 90 MG: 90 TABLET ORAL at 13:15

## 2020-05-18 RX ADMIN — METOPROLOL TARTRATE 100 MG: 50 TABLET, FILM COATED ORAL at 13:15

## 2020-05-18 RX ADMIN — Medication 10 ML: at 03:31

## 2020-05-18 RX ADMIN — ISOSORBIDE MONONITRATE 10 MG: 10 TABLET ORAL at 13:20

## 2020-05-18 RX ADMIN — TICAGRELOR 90 MG: 90 TABLET ORAL at 22:29

## 2020-05-18 RX ADMIN — ATORVASTATIN CALCIUM 40 MG: 40 TABLET, FILM COATED ORAL at 13:15

## 2020-05-18 RX ADMIN — INSULIN LISPRO 5 UNITS: 100 INJECTION, SOLUTION INTRAVENOUS; SUBCUTANEOUS at 18:16

## 2020-05-18 RX ADMIN — ASPIRIN 81 MG 81 MG: 81 TABLET ORAL at 13:15

## 2020-05-18 NOTE — ED NOTES
Bedside shift report provided by Elisa Orozco RN. Patient resting comfortably on stretcher. Alert and oriented. Updated on plan of care. Call bell in reach. Will continue to monitor. 7889- Report provided to inpatient RN.

## 2020-05-18 NOTE — H&P
Hospitalist Admission Note    NAME: Rigo Lowe   :  1960   MRN:  [id number]     Date/Time:  2020 9:17 PM    Patient PCP: Tino Hazel MD  ______________________________________________________________________  Given the patient's current clinical presentation, I have a high level of concern for decompensation if discharged from the emergency department. Complex decision making was performed, which includes reviewing the patient's available past medical records, laboratory results, and x-ray films. My assessment of this patient's clinical condition and my plan of care is as follows.     Assessment / Plan:  Non-ST elevation MI/coronary artery disease POA  -Patient presents with tightness and numbness across the chest radiating to arms bilaterally  -Initial troponin was 0.15 which has trended up to 0.17  -He had a left heart catheterization previously which was unsuccessful and had been on medical management  -Admit to telemetry  -Lovenox dose given in the ED  -Continue beta-blocker and Brilinta  -Continue statin, increased dose to 40 mg of atorvastatin daily  -Trend cardiac enzymes  -Cardiology consult requested in the ED  -Obtain echocardiogram  -Add lisinopril 5 mg daily  -Continue Imdur    Non-insulin-dependent diabetes:  -Metformin and glipizide for now  -Use sliding scale insulin in anticipation for left heart catheterization  -Check hemoglobin A1c      Hypertension  -Continue metoprolol, add lisinopril 5 mg daily  -Continue Imdur  -Hold hydrochlorothiazide due to hypokalemia    Hypokalemia:  -We will be replaced orally and IV  -Likely due to hydrochlorothiazide use      Dyslipidemia:  -Continue statin, increase the dose to atorvastatin 40 mg daily      Tobacco abuse:  -Extensive counseling provided  -Patient reports he has cut down from 1 pack a day to 3 cigarettes a day        Code Status: Full code  Surrogate Decision Maker:  Paula Cardoza and daughter Blade Feldman Sofía Castro    DVT Prophylaxis: Patient received Lovenox  GI Prophylaxis: not indicated    Baseline: Independent      Subjective:   CHIEF COMPLAINT: Chest tightness    HISTORY OF PRESENT ILLNESS:     Cheryle Franks is a 62 y.o.  female with past medical history significant for coronary artery disease on medical management, hypertension, dyslipidemia, tobacco abuse and non-insulin-dependent diabetes who presents with chest tightness which started 3 hours prior to presentation. Patient reports her symptoms started while she was painting her kitchen table. Her symptoms were tightness in the chest radiating to the both arms and felt like numbness. Symptoms are persistent and lasted for almost 3 hours. She denies any diaphoresis, diplopia, dysphagia dysarthria or focal weakness  Patient denies any fever, palpitations, shortness of breath, orthopnea, PND, nausea, vomiting, diarrhea, urine urgency urgency dysuria, heat or cold intolerance. In the ED she was noted to have elevated troponin    We were asked to admit for work up and evaluation of the above problems. Past Medical History:   Diagnosis Date    Calculus of kidney     Difficult intubation     per patient she was given 2 papers in regards to this, asked her to bring DOS.  DM (diabetes mellitus) ([facility] [address]     GERD (gastroesophageal reflux disease)     Headache     Hypertension     Sleep apnea     no cpap- severe EM per patient        Past Surgical History:   Procedure Laterality Date    HX GYN      ovary removed    HX HEENT Bilateral 2016    BMT's    HX OTHER SURGICAL  2010    KIDNEY STONES REMOVED       Social History     Tobacco Use    Smoking status: Current Every Day Smoker     Packs/day: 0.25    Smokeless tobacco: Never Used   Substance Use Topics    Alcohol use: No        No family history on file. Significant for coronary artery disease  No Known Allergies     Prior to Admission medications    Medication Sig Start Date End Date Taking? Authorizing Provider   ISOSORBIDE MONONITRATE PO Take  by mouth. Yes Herminia, MD Yovani   ticagrelor (BRILINTA) 90 mg tablet Take 90 mg by mouth two (2) times a day. Yes Yovani Hope MD   metoprolol tartrate (LOPRESSOR) 100 mg IR tablet Take 100 mg by mouth daily. Yes Yovani Hope MD   glipiZIDE (GLUCOTROL) 5 mg tablet Take  by mouth daily. Yes Provider, Historical   metFORMIN (GLUCOPHAGE) 500 mg tablet Take 750 mg by mouth two (2) times daily (with meals). Yes Provider, Historical   atorvastatin (LIPITOR) 10 mg tablet Take 10 mg by mouth daily. Provider, Historical   Lancets misc Check BG 4x/day before meals and bedtime  Patient taking differently: two (2) times a day. Check BG 4x/day before meals and bedtime 5/24/18   Rebecca Chauhan MD   hydroCHLOROthiazide (HYDRODIURIL) 25 mg tablet Take 25 mg by mouth daily. Yovani Hope MD   ondansetron (ZOFRAN ODT) 4 mg disintegrating tablet Take 1 Tab by mouth every eight (8) hours as needed for Nausea. 5/15/18   [facility], MD       REVIEW OF SYSTEMS:     I am not able to complete the review of systems because:    The patient is intubated and sedated    The patient has altered mental status due to his acute medical problems    The patient has baseline aphasia from prior stroke(s)    The patient has baseline dementia and is not reliable historian    The patient is in acute medical distress and unable to provide information           Total of 12 systems reviewed as follows:       POSITIVE= underlined text  Negative = text not underlined  General:  fever, chills, sweats, generalized weakness, weight loss/gain,      loss of appetite   Eyes:    blurred vision, eye pain, loss of vision, double vision  ENT:    rhinorrhea, pharyngitis   Respiratory:   cough, sputum production, SOB, ALVAREZ, wheezing, pleuritic pain   Cardiology:   chest pain, palpitations, orthopnea, PND, edema, syncope   Gastrointestinal:  abdominal pain , N/V, diarrhea, dysphagia, constipation, bleeding   Genitourinary:  frequency, urgency, dysuria, hematuria, incontinence   Muskuloskeletal :  arthralgia, myalgia, back pain  Hematology:  easy bruising, nose or gum bleeding, lymphadenopathy   Dermatological: rash, ulceration, pruritis, color change / jaundice  Endocrine:   hot flashes or polydipsia   Neurological:  headache, dizziness, confusion, focal weakness, paresthesia,     Speech difficulties, memory loss, gait difficulty  Psychological: Feelings of anxiety, depression, agitation    Objective:   VITALS:    Visit Vitals  /60 (BP 1 Location: Right arm, BP Patient Position: At rest)   Pulse 69   Temp 98.3 °F (36.8 °C)   Resp 20   Ht 5' 5\" (1.651 m)   Wt 104.7 kg (230 lb 13.2 oz)   SpO2 95%   BMI 38.41 kg/m²       PHYSICAL EXAM:    General:    Alert, cooperative, no distress, appears stated age. HEENT: Atraumatic, anicteric sclerae, pink conjunctivae     No oral ulcers, mucosa moist, throat clear, dentition fair  Neck:  Supple, symmetrical,  thyroid: non tender  Lungs:   Clear to auscultation bilaterally. No Wheezing or Rhonchi. No rales. Chest wall:  No tenderness  No Accessory muscle use. Heart:   Regular  rhythm,  No  murmur   No edema  Abdomen:   Soft, non-tender. Not distended. Bowel sounds normal  Extremities: No cyanosis. No clubbing,      Skin turgor normal, Capillary refill normal, Radial dial pulse 2+  Skin:     Not pale. Not Jaundiced  No rashes   Psych:  Good insight. Not depressed. Not anxious or agitated. Neurologic: EOMs intact. No facial asymmetry. No aphasia or slurred speech. Symmetrical strength, Sensation grossly intact.  Alert and oriented X 4.     _______________________________________________________________________  Care Plan discussed with:    Comments   Patient x    Family      RN     Care Manager                    Consultant:      _______________________________________________________________________  Expected  Disposition:   Home with Family    HH/PT/OT/RN SNF/LTC    HAYDEE    ________________________________________________________________________  TOTAL TIME:  35 Minutes    Critical Care Provided     Minutes non procedure based      Comments     Reviewed previous records   >50% of visit spent in counseling and coordination of care  Discussion with patient and/or family and questions answered       ________________________________________________________________________  Signed: Monika Brennan MD    Procedures: see electronic medical records for all procedures/Xrays and details which were not copied into this note but were reviewed prior to creation of Plan. LAB DATA REVIEWED:    Recent Results (from the past 24 hour(s))   EKG, 12 LEAD, INITIAL    Collection Time: 05/17/20  5:01 PM   Result Value Ref Range    Ventricular Rate 73 BPM    Atrial Rate 73 BPM    P-R Interval 148 ms    QRS Duration 90 ms    Q-T Interval 430 ms    QTC Calculation (Bezet) 473 ms    Calculated P Axis 54 degrees    Calculated R Axis -8 degrees    Calculated T Axis 27 degrees    Diagnosis       Normal sinus rhythm  Inferior infarct , age undetermined  Anterior infarct (cited on or before 17-MAY-2020)  When compared with ECG of 04-APR-2019 10:00,  Inferior infarct is now present     CBC WITH AUTOMATED DIFF    Collection Time: 05/17/20  6:05 PM   Result Value Ref Range    WBC 9.2 3.6 - 11.0 K/uL    RBC 4.66 3.80 - 5.20 M/uL    HGB 13.5 11.5 - 16.0 g/dL    HCT 40.3 35.0 - 47.0 %    MCV 86.5 80.0 - 99.0 FL    MCH 29.0 26.0 - 34.0 PG    MCHC 33.5 30.0 - 36.5 g/dL    RDW 14.3 11.5 - 14.5 %    PLATELET 993 644 - 911 K/uL    MPV 10.7 8.9 - 12.9 FL    NRBC 0.0 0  WBC    ABSOLUTE NRBC 0.00 0.00 - 0.01 K/uL    NEUTROPHILS 62 32 - 75 %    LYMPHOCYTES 30 12 - 49 %    MONOCYTES 6 5 - 13 %    EOSINOPHILS 2 0 - 7 %    BASOPHILS 0 0 - 1 %    IMMATURE GRANULOCYTES 0 0.0 - 0.5 %    ABS. NEUTROPHILS 5.7 1.8 - 8.0 K/UL    ABS. LYMPHOCYTES 2.7 0.8 - 3.5 K/UL    ABS. MONOCYTES 0.5 0.0 - 1.0 K/UL    ABS. EOSINOPHILS 0.2 0.0 - 0.4 K/UL    ABS. BASOPHILS 0.0 0.0 - 0.1 K/UL    ABS. IMM. GRANS. 0.0 0.00 - 0.04 K/UL    DF AUTOMATED     METABOLIC PANEL, COMPREHENSIVE    Collection Time: 05/17/20  6:05 PM   Result Value Ref Range    Sodium 140 136 - 145 mmol/L    Potassium 2.8 (L) 3.5 - 5.1 mmol/L    Chloride 107 97 - 108 mmol/L    CO2 25 21 - 32 mmol/L    Anion gap 8 5 - 15 mmol/L    Glucose 132 (H) 65 - 100 mg/dL    BUN 17 6 - 20 MG/DL    Creatinine 0.74 0.55 - 1.02 MG/DL    BUN/Creatinine ratio 23 (H) 12 - 20      GFR est AA >60 >60 ml/min/1.73m2    GFR est non-AA >60 >60 ml/min/1.73m2    Calcium 8.0 (L) 8.5 - 10.1 MG/DL    Bilirubin, total 0.3 0.2 - 1.0 MG/DL    ALT (SGPT) 26 12 - 78 U/L    AST (SGOT) 17 15 - 37 U/L    Alk.  phosphatase 69 45 - 117 U/L    Protein, total 7.6 6.4 - 8.2 g/dL    Albumin 3.5 3.5 - 5.0 g/dL    Globulin 4.1 (H) 2.0 - 4.0 g/dL    A-G Ratio 0.9 (L) 1.1 - 2.2     CK W/ REFLX CKMB    Collection Time: 05/17/20  6:05 PM   Result Value Ref Range    CK 92 26 - 192 U/L   TROPONIN I    Collection Time: 05/17/20  6:05 PM   Result Value Ref Range    Troponin-I, Qt. 0.15 (H) <0.05 ng/mL

## 2020-05-18 NOTE — ROUTINE PROCESS
3097: Report received from Erika, [address]: Pt just arrived to [facility], no complaints. 1788: Cardiology consult called in to Dr Carola Barakat. 1015: Per MD, pt can eat and plan is for CCL tomorrow. Per MD, do not need to continue checking trops.

## 2020-05-18 NOTE — PROGRESS NOTES
Problem: Diabetes Self-Management  Goal: *Disease process and treatment process  Description: Define diabetes and identify own type of diabetes; list 3 options for treating diabetes. Outcome: Progressing Towards Goal  Goal: *Incorporating nutritional management into lifestyle  Description: Describe effect of type, amount and timing of food on blood glucose; list 3 methods for planning meals.   Outcome: Progressing Towards Goal

## 2020-05-18 NOTE — PROGRESS NOTES
Hospitalist Progress Note    NAME: Cheryle Franks   :  1960   MRN:  [id number]     Interim Hospital Summary: 62 y.o. female whom presented on 2020 with      Assessment / Plan:    NSTEMI  -Patient presents with tightness and numbness across the chest radiating to arms bilaterally  -troponin mildly elevated with flat trend  -S/P recent unsuccessful PCI 3/20 at [facility]  -continue DAPT, BB statin  -cardiology input appreciated. Planning for cath / attempt at PCI in AM     Type 2 DM (A1c 7.1)  -Metformin and glipizide for now  -Use sliding scale insulin in anticipation for left heart catheterization     Hypertension  -Continue metoprolol, added lisinopril 5 mg daily. Adjust prn  -Hold hydrochlorothiazide due to hypokalemia     Hypokalemia:  -repleted. Recheck in AM with Mg level  -Hold HCTZ     Headache  -frontal pressure. Seems like muscle tension type from stress  -try fioricet prn     Dyslipidemia:  -continue lipitor     Tobacco abuse:  -Patient reports he has cut down from 1 pack a day to 3 cigarettes a day           Code Status: Full code  Surrogate Decision Maker:  Dolores Marquis and daughter Dorotha Duverney     DVT Prophylaxis: Patient received Lovenox  GI Prophylaxis: not indicated       30.0 - 39.9 Obese / Body mass index is 38.41 kg/m². Subjective:     Chief Complaint / Reason for Physician Visit  Follow up of NSTEMI, DM, HTN  Chart reviewed in detail. Discussed with RN events overnight. Review of Systems:  Symptom Y/N Comments  Symptom Y/N Comments   Fever/Chills    Chest Pain     Poor Appetite    Edema     Cough    Abdominal Pain     Sputum    Joint Pain     SOB/ALVAREZ    Pruritis/Rash     Nausea/vomit    Tolerating PT/OT     Diarrhea    Tolerating Diet     Constipation    Other       Could NOT obtain due to:      PO intake: No data found. Objective:     VITALS:   Last 24hrs VS reviewed since prior progress note.  Most recent are:  Patient Vitals for the past 24 hrs:   Temp Pulse Resp BP SpO2   05/18/20 0939 98.3 °F (36.8 °C) 67 18 136/56 96 %   05/18/20 0900 98 °F (36.7 °C) 61 18 146/75 96 %   05/18/20 0800 -- 63 22 138/67 94 %   05/18/20 0316 97.9 °F (36.6 °C) 60 21 115/53 94 %   05/18/20 0200 -- (!) 58 19 (!) 124/39 91 %   05/18/20 0100 -- 67 23 110/43 98 %   05/17/20 2300 -- 64 21 111/47 95 %   05/17/20 1926 98.3 °F (36.8 °C) 69 20 125/60 95 %   05/17/20 1830 -- 70 13 128/55 95 %   05/17/20 1800 -- 73 14 135/79 97 %   05/17/20 1646 99 °F (37.2 °C) 85 18 129/58 --       Intake/Output Summary (Last 24 hours) at 5/18/2020 1522  Last data filed at 5/17/2020 2103  Gross per 24 hour   Intake 100 ml   Output --   Net 100 ml        PHYSICAL EXAM:  General: WD, WN. Alert, cooperative, no acute distress    EENT:  EOMI. Anicteric sclerae. MMM  Resp:  CTA bilaterally, no wheezing or rales. No accessory muscle use  CV:  Regular  rhythm,  No edema  GI:  Soft, Non distended, Non tender.  +Bowel sounds  Neurologic:  Alert and oriented X 3, normal speech,   Psych:   Good insight. Not anxious nor agitated  Skin:  No rashes. No jaundice    Reviewed most current lab test results and cultures  YES  Reviewed most current radiology test results   YES  Review and summation of old records today    NO  Reviewed patient's current orders and MAR    YES  PMH/SH reviewed - no change compared to H&P  ________________________________________________________________________  Care Plan discussed with:    Comments   Patient x    Family      RN     Care Manager     Consultant                        Multidiciplinary team rounds were held today with , nursing, pharmacist and clinical coordinator. Patient's plan of care was discussed; medications were reviewed and discharge planning was addressed.      ________________________________________________________________________  Total NON critical care TIME:  15   Minutes    Total CRITICAL CARE TIME Spent:   Minutes non procedure based      Comments   >50% of visit spent in counseling and coordination of care x     This includes time during multidisciplinary rounds if indicated above   ________________________________________________________________________  Ally Alvarado MD     Procedures: see electronic medical records for all procedures/Xrays and details which were not copied into this note but were reviewed prior to creation of Plan. LABS:  I reviewed today's most current labs and imaging studies.   Pertinent labs include:  Recent Labs     05/18/20  0336 05/17/20  1805   WBC 8.8 9.2   HGB 13.0 13.5   HCT 39.1 40.3    270     Recent Labs     05/18/20  0336 05/17/20  1805    140   K 3.3* 2.8*   * 107   CO2 26 25   * 132*   BUN 15 17   CREA 0.65 0.74   CA 8.2* 8.0*   ALB  --  3.5   TBILI  --  0.3   SGOT  --  17   ALT  --  26

## 2020-05-18 NOTE — CONSULTS
Consult    NAME: John Paul Riley   :  1960   MRN:  [id number]     Date/Time:  2020 10:42 AM    Patient PCP: Devon Nichole MD  ________________________________________________________________________     Assessment:     1. Indeterminate troponin elevation  2. Coronary artery disease. NSTEMI 3/20 w/ cath @ [facility]. RCA  attempt (wire would cross only); OM2 lesion and a diagonal lesion. 3. Diabetes  4. Hypertension  5. Hypokalemia  6. EM on CPAPP  7. GERD  8. Hyperlipidemia  9. Tobacco abuse  10. Cardiologist:  Petra          Plan: TnI peak @ 0.2    1. NPO p MN  2. I have recommended diagnostic cath for definitive evaluation of the patient's coronary anatomy and PCI if appropriate. All risks, benefits, and alternatives were discussed and the patient wishes to proceed. 3. Groin access  4. Continue ASA (not ordered)  5. Continue ticagrelor  6. Continue metoprolol  7. Continue ISMN  8. Continue lisinopril  9. Continue statin  10. She does not need a repeat echo  11. Discussed w/ dtr (critical care RN)    Thank you for this consult and allowing me to take part in this patients care. Please call with questions. [x]        High complexity decision making was performed        Subjective:   CHIEF COMPLAINT: CP    HISTORY OF PRESENT ILLNESS:     Wandra Bernheim is a 62 y.o.  female who \"presents with chest tightness which started 3 hours prior to presentation. Patient reports her symptoms started while she was painting her kitchen table. Her symptoms were tightness in the chest radiating to the both arms and felt like numbness. Symptoms are persistent and lasted for almost 3 hours. She denies any diaphoresis, diplopia, dysphagia dysarthria or focal weakness  Patient denies any fever, palpitations, shortness of breath, orthopnea, PND, nausea, vomiting, diarrhea, urine urgency urgency dysuria, heat or cold intolerance.   In the ED she was noted to have elevated troponin\"      We were asked to consult for work up and evaluation of the above problems. Past Medical History:   Diagnosis Date    Calculus of kidney     Difficult intubation     per patient she was given 2 papers in regards to this, asked her to bring DOS.  DM (diabetes mellitus) ([facility] [address]     GERD (gastroesophageal reflux disease)     Headache     Hypertension     Sleep apnea     no cpap- severe EM per patient      Past Surgical History:   Procedure Laterality Date    HX GYN      ovary removed    HX HEENT Bilateral 2016    BMT's    HX OTHER SURGICAL  2010    KIDNEY STONES REMOVED     No Known Allergies   Meds:  See below  Social History     Tobacco Use    Smoking status: Current Every Day Smoker     Packs/day: 0.25    Smokeless tobacco: Never Used   Substance Use Topics    Alcohol use: No      No family history on file. REVIEW OF SYSTEMS:     []         Unable to obtain  ROS due to ---   [x]         Total of 12 systems reviewed as follows:    Constitutional: negative fever, negative chills, negative weight loss  Eyes:   negative visual changes  ENT:   negative sore throat, tongue or lip swelling  Respiratory:  negative cough, negative dyspnea  Cards:  negative for chest pain, palpitations, lower extremity edema  GI:   negative for nausea, vomiting, diarrhea, and abdominal pain  Genitourinary: negative for frequency, dysuria  Integument:  negative for rash   Hematologic:  negative for easy bruising and gum/nose bleeding  Musculoskel: negative for myalgias,  back pain  Neurological:  negative for headaches, dizziness, vertigo, weakness  Behavl/Psych: negative for feelings of anxiety, depression     Pertinent Positives include :    Objective:      Physical Exam:    Last 24hrs VS reviewed since prior progress note.  Most recent are:    Visit Vitals  /56   Pulse 67   Temp 98.3 °F (36.8 °C)   Resp 18   Ht 5' 5\" (1.651 m)   Wt 104.7 kg (230 lb 13.2 oz)   LMP  (LMP Unknown)   SpO2 96%   BMI 38.41 kg/m²       Intake/Output Summary (Last 24 hours) at 5/18/2020 1042  Last data filed at 5/17/2020 2103  Gross per 24 hour   Intake 100 ml   Output --   Net 100 ml        General Appearance: Well developed, well nourished, alert & oriented x 3,    no acute distress. Obese. Ears/Nose/Mouth/Throat: Pupils equal and round, Hearing grossly normal.  Neck: Supple. JVP within normal limits. Carotids good upstrokes, with no bruit. Chest: Lungs clear to auscultation bilaterally. Cardiovascular: Regular rate and rhythm, S1S2 normal, no murmur, rubs, gallops. Abdomen: Soft, non-tender, bowel sounds are active. No organomegaly. Extremities: No edema bilaterally. Femoral pulses +2, Distal Pulses +1. Skin: Warm and dry. Neuro: CN II-XII grossly intact, Strength and sensation grossly intact. []         Post-cath site without hematoma, bruit, tenderness, or thrill. Distal pulses intact. Data:      Telemetry:      EKG:  , [facility]  []  No new EKG for review. Prior to Admission medications    Medication Sig Start Date End Date Taking? Authorizing Provider   ISOSORBIDE MONONITRATE PO Take 30 mg by mouth daily. Yes Other, MD Yovani   ticagrelor (BRILINTA) 90 mg tablet Take 90 mg by mouth two (2) times a day. Yes Other, MD Yovani   metoprolol tartrate (LOPRESSOR) 100 mg IR tablet Take 100 mg by mouth daily. Yes Herminia, MD Yovani   atorvastatin (LIPITOR) 80 mg tablet Take 80 mg by mouth nightly. Yes Herminia, MD Yovani   metFORMIN ER (GLUCOPHAGE XR) 750 mg tablet Take 750 mg by mouth two (2) times a day. Yes Herminia, MD Yovani   pantoprazole (PROTONIX) 40 mg tablet Take 40 mg by mouth daily. Yes Yovani Hope MD   amLODIPine (NORVASC) 10 mg tablet Take 10 mg by mouth daily. Yes Herminia, MD Yovani   aspirin delayed-release 81 mg tablet Take  by mouth daily. Yes Other, MD Yovani   lisinopriL (PRINIVIL, ZESTRIL) 40 mg tablet Take 40 mg by mouth daily.    Yes Herminia, MD Yovani   spironolactone (ALDACTONE) 25 mg tablet Take 25 mg by mouth daily. Yes Other, MD Yovani   metoprolol succinate (TOPROL-XL) 100 mg tablet Take 100 mg by mouth two (2) times a day. Yes Other, MD Yovani   glipiZIDE (GLUCOTROL) 5 mg tablet Take 5 mg by mouth daily. Yes Provider, Historical       Recent Results (from the past 24 hour(s))   EKG, 12 LEAD, INITIAL    Collection Time: 05/17/20  5:01 PM   Result Value Ref Range    Ventricular Rate 73 BPM    Atrial Rate 73 BPM    P-R Interval 148 ms    QRS Duration 90 ms    Q-T Interval 430 ms    QTC Calculation (Bezet) 473 ms    Calculated P Axis 54 degrees    Calculated R Axis -8 degrees    Calculated T Axis 27 degrees    Diagnosis       Normal sinus rhythm  Inferior infarct , age undetermined  Anterior infarct (cited on or before 17-MAY-2020)  When compared with ECG of 04-APR-2019 10:00,  Inferior infarct is now present     CBC WITH AUTOMATED DIFF    Collection Time: 05/17/20  6:05 PM   Result Value Ref Range    WBC 9.2 3.6 - 11.0 K/uL    RBC 4.66 3.80 - 5.20 M/uL    HGB 13.5 11.5 - 16.0 g/dL    HCT 40.3 35.0 - 47.0 %    MCV 86.5 80.0 - 99.0 FL    MCH 29.0 26.0 - 34.0 PG    MCHC 33.5 30.0 - 36.5 g/dL    RDW 14.3 11.5 - 14.5 %    PLATELET 765 022 - 740 K/uL    MPV 10.7 8.9 - 12.9 FL    NRBC 0.0 0  WBC    ABSOLUTE NRBC 0.00 0.00 - 0.01 K/uL    NEUTROPHILS 62 32 - 75 %    LYMPHOCYTES 30 12 - 49 %    MONOCYTES 6 5 - 13 %    EOSINOPHILS 2 0 - 7 %    BASOPHILS 0 0 - 1 %    IMMATURE GRANULOCYTES 0 0.0 - 0.5 %    ABS. NEUTROPHILS 5.7 1.8 - 8.0 K/UL    ABS. LYMPHOCYTES 2.7 0.8 - 3.5 K/UL    ABS. MONOCYTES 0.5 0.0 - 1.0 K/UL    ABS. EOSINOPHILS 0.2 0.0 - 0.4 K/UL    ABS. BASOPHILS 0.0 0.0 - 0.1 K/UL    ABS. IMM.  GRANS. 0.0 0.00 - 0.04 K/UL    DF AUTOMATED     METABOLIC PANEL, COMPREHENSIVE    Collection Time: 05/17/20  6:05 PM   Result Value Ref Range    Sodium 140 136 - 145 mmol/L    Potassium 2.8 (L) 3.5 - 5.1 mmol/L    Chloride 107 97 - 108 mmol/L    CO2 25 21 - 32 mmol/L    Anion gap 8 5 - 15 mmol/L Glucose 132 (H) 65 - 100 mg/dL    BUN 17 6 - 20 MG/DL    Creatinine 0.74 0.55 - 1.02 MG/DL    BUN/Creatinine ratio 23 (H) 12 - 20      GFR est AA >60 >60 ml/min/1.73m2    GFR est non-AA >60 >60 ml/min/1.73m2    Calcium 8.0 (L) 8.5 - 10.1 MG/DL    Bilirubin, total 0.3 0.2 - 1.0 MG/DL    ALT (SGPT) 26 12 - 78 U/L    AST (SGOT) 17 15 - 37 U/L    Alk.  phosphatase 69 45 - 117 U/L    Protein, total 7.6 6.4 - 8.2 g/dL    Albumin 3.5 3.5 - 5.0 g/dL    Globulin 4.1 (H) 2.0 - 4.0 g/dL    A-G Ratio 0.9 (L) 1.1 - 2.2     CK W/ REFLX CKMB    Collection Time: 05/17/20  6:05 PM   Result Value Ref Range    CK 92 26 - 192 U/L   TROPONIN I    Collection Time: 05/17/20  6:05 PM   Result Value Ref Range    Troponin-I, Qt. 0.15 (H) <0.05 ng/mL   TROPONIN I    Collection Time: 05/17/20  8:35 PM   Result Value Ref Range    Troponin-I, Qt. 0.17 (H) <0.05 ng/mL   EKG, 12 LEAD, SUBSEQUENT    Collection Time: 05/17/20  8:42 PM   Result Value Ref Range    Ventricular Rate 63 BPM    Atrial Rate 63 BPM    P-R Interval 154 ms    QRS Duration 94 ms    Q-T Interval 464 ms    QTC Calculation (Bezet) 474 ms    Calculated P Axis 50 degrees    Calculated T Axis 45 degrees    Diagnosis       Normal sinus rhythm  Inferior infarct , age undetermined  Cannot rule out Anterior infarct , age undetermined  When compared with ECG of 17-MAY-2020 17:01,  MANUAL COMPARISON REQUIRED, DATA IS UNCONFIRMED     GLUCOSE, POC    Collection Time: 05/17/20 11:38 PM   Result Value Ref Range    Glucose (POC) 163 (H) 65 - 100 mg/dL    Performed by Judy Walters    HEMOGLOBIN A1C WITH EAG    Collection Time: 05/18/20  3:36 AM   Result Value Ref Range    Hemoglobin A1c 7.1 (H) 4.0 - 5.6 %    Est. average glucose 367 mg/dL   METABOLIC PANEL, BASIC    Collection Time: 05/18/20  3:36 AM   Result Value Ref Range    Sodium 140 136 - 145 mmol/L    Potassium 3.3 (L) 3.5 - 5.1 mmol/L    Chloride 109 (H) 97 - 108 mmol/L    CO2 26 21 - 32 mmol/L    Anion gap 5 5 - 15 mmol/L Glucose 122 (H) 65 - 100 mg/dL    BUN 15 6 - 20 MG/DL    Creatinine 0.65 0.55 - 1.02 MG/DL    BUN/Creatinine ratio 23 (H) 12 - 20      GFR est AA >60 >60 ml/min/1.73m2    GFR est non-AA >60 >60 ml/min/1.73m2    Calcium 8.2 (L) 8.5 - 10.1 MG/DL   LIPID PANEL    Collection Time: 05/18/20  3:36 AM   Result Value Ref Range    LIPID PROFILE          Cholesterol, total 99 <200 MG/DL    Triglyceride 88 <150 MG/DL    HDL Cholesterol 30 MG/DL    LDL, calculated 51.4 0 - 100 MG/DL    VLDL, calculated 17.6 MG/DL    CHOL/HDL Ratio 3.3 0.0 - 5.0     CBC W/O DIFF    Collection Time: 05/18/20  3:36 AM   Result Value Ref Range    WBC 8.8 3.6 - 11.0 K/uL    RBC 4.49 3.80 - 5.20 M/uL    HGB 13.0 11.5 - 16.0 g/dL    HCT 39.1 35.0 - 47.0 %    MCV 87.1 80.0 - 99.0 FL    MCH 29.0 26.0 - 34.0 PG    MCHC 33.2 30.0 - 36.5 g/dL    RDW 14.6 (H) 11.5 - 14.5 %    PLATELET 195 438 - 754 K/uL    MPV 10.8 8.9 - 12.9 FL    NRBC 0.0 0  WBC    ABSOLUTE NRBC 0.00 0.00 - 0.01 K/uL   TROPONIN I    Collection Time: 05/18/20  3:36 AM   Result Value Ref Range    Troponin-I, Qt. 0.15 (H) <0.05 ng/mL   GLUCOSE, POC    Collection Time: 05/18/20  8:46 AM   Result Value Ref Range    Glucose (POC) 152 (H) 65 - 100 mg/dL    Performed by Cheryl Matthews    TROPONIN I    Collection Time: 05/18/20  9:47 AM   Result Value Ref Range    Troponin-I, Qt. 0.16 (H) <0.05 ng/mL        Mary Jane Rivera III, DO

## 2020-05-18 NOTE — ED PROVIDER NOTES
EMERGENCY DEPARTMENT HISTORY AND PHYSICAL EXAM      Date: 5/17/2020  Patient Name: Terrie Nails    History of Presenting Illness     Chief Complaint   Patient presents with    Tingling     c/o tingling in bilateral arms for several hours.  Chest Pain     \"it feels like electricty is running from hand to hand through my chest.\"       History Provided By: Patient    HPI: Terrie Nails, 62 y.o. female with PMHx significant for diabetes, GERD, hypertension, known CAD, presents to the ED with cc of substernal chest discomfort and tingling in both arms over the last several hours. Patient was admitted approximately 6 weeks ago at [facility] with an known NSTEMI. At that time they attempted to do a cardiac catheterization but were unable to recannulate or place a stent. She was managed medically and is supposed to see [facility] tomorrow, however, the chest pain today had a very concerned since it felt similar to her prior presentation. She had no associated nausea, diaphoresis, shortness of breath, leg swelling, or any other associated symptoms. No other exacerbating or militating factors. There are no other complaints, changes, or physical findings at this time. PCP: Nguyen Toussaint MD    No current facility-administered medications on file prior to encounter. Current Outpatient Medications on File Prior to Encounter   Medication Sig Dispense Refill    ISOSORBIDE MONONITRATE PO Take 30 mg by mouth daily.  ticagrelor (BRILINTA) 90 mg tablet Take 90 mg by mouth two (2) times a day.  metoprolol tartrate (LOPRESSOR) 100 mg IR tablet Take 100 mg by mouth daily.  atorvastatin (LIPITOR) 80 mg tablet Take 80 mg by mouth nightly.  metFORMIN ER (GLUCOPHAGE XR) 750 mg tablet Take 750 mg by mouth two (2) times a day.  pantoprazole (PROTONIX) 40 mg tablet Take 40 mg by mouth daily.  amLODIPine (NORVASC) 10 mg tablet Take 10 mg by mouth daily.       aspirin delayed-release 81 mg tablet Take  by mouth daily.  lisinopriL (PRINIVIL, ZESTRIL) 40 mg tablet Take 40 mg by mouth daily.  spironolactone (ALDACTONE) 25 mg tablet Take 25 mg by mouth daily.  metoprolol succinate (TOPROL-XL) 100 mg tablet Take 100 mg by mouth two (2) times a day.  glipiZIDE (GLUCOTROL) 5 mg tablet Take 5 mg by mouth daily.  [DISCONTINUED] atorvastatin (LIPITOR) 10 mg tablet Take 80 mg by mouth daily.  [DISCONTINUED] metFORMIN (GLUCOPHAGE) 500 mg tablet Take 750 mg by mouth two (2) times daily (with meals).  [DISCONTINUED] insulin glargine (LANTUS SOLOSTAR U-100 INSULIN) 100 unit/mL (3 mL) inpn 20 units sq qhs (Patient taking differently: 26 units sq qhs) 1 Pen 0    [DISCONTINUED] Lancets misc Check BG 4x/day before meals and bedtime (Patient taking differently: two (2) times a day. Check BG 4x/day before meals and bedtime) 1 Each 0    [DISCONTINUED] glucose blood VI test strips (CONTOUR NEXT TEST STRIPS) strip As directed (Patient taking differently: two (2) times a day. As directed) 50 Strip 0    [DISCONTINUED] hydroCHLOROthiazide (HYDRODIURIL) 25 mg tablet Take 25 mg by mouth daily.  [DISCONTINUED] ondansetron (ZOFRAN ODT) 4 mg disintegrating tablet Take 1 Tab by mouth every eight (8) hours as needed for Nausea. 10 Tab 0       Past History     Past Medical History:  Past Medical History:   Diagnosis Date    Calculus of kidney     Difficult intubation     per patient she was given 2 papers in regards to this, asked her to bring DOS.  DM (diabetes mellitus) ([facility] [address]     GERD (gastroesophageal reflux disease)     Headache     Hypertension     Sleep apnea     no cpap- severe EM per patient       Past Surgical History:  Past Surgical History:   Procedure Laterality Date    HX GYN      ovary removed    HX HEENT Bilateral 2016    BMT's    HX OTHER SURGICAL  2010    KIDNEY STONES REMOVED       Family History:  No family history on file.     Social History:  Social History     Tobacco Use    Smoking status: Current Every Day Smoker     Packs/day: 0.25    Smokeless tobacco: Never Used   Substance Use Topics    Alcohol use: No    Drug use: No       Allergies:  No Known Allergies      Review of Systems   Review of Systems   Constitutional: Negative for chills, diaphoresis, fatigue and fever. HENT: Negative for ear pain and sore throat. Eyes: Negative for pain and redness. Respiratory: Negative for cough and shortness of breath. Cardiovascular: Positive for chest pain. Negative for leg swelling. Gastrointestinal: Negative for abdominal pain, diarrhea, nausea and vomiting. Endocrine: Negative for cold intolerance and heat intolerance. Genitourinary: Negative for flank pain and hematuria. Musculoskeletal: Negative for back pain and neck stiffness. Skin: Negative for rash and wound. Neurological: Negative for dizziness, syncope and headaches. All other systems reviewed and are negative. Physical Exam   Physical Exam  Vitals signs and nursing note reviewed. Constitutional:       Appearance: She is well-developed. HENT:      Head: Normocephalic and atraumatic. Mouth/Throat:      Pharynx: No oropharyngeal exudate. Eyes:      Conjunctiva/sclera: Conjunctivae normal.      Pupils: Pupils are equal, round, and reactive to light. Neck:      Musculoskeletal: Normal range of motion. Cardiovascular:      Rate and Rhythm: Normal rate and regular rhythm. Heart sounds: No murmur. Pulmonary:      Effort: Pulmonary effort is normal. No respiratory distress. Breath sounds: Normal breath sounds. No wheezing. Abdominal:      General: Bowel sounds are normal. There is no distension. Palpations: Abdomen is soft. Tenderness: There is no abdominal tenderness. Musculoskeletal: Normal range of motion. General: No deformity. Skin:     General: Skin is warm and dry. Findings: No rash.    Neurological:      Mental Status: She is alert and oriented to person, place, and time. Coordination: Coordination normal.   Psychiatric:         Behavior: Behavior normal.         Diagnostic Study Results     Labs -     Recent Results (from the past 24 hour(s))   EKG, 12 LEAD, INITIAL    Collection Time: 05/17/20  5:01 PM   Result Value Ref Range    Ventricular Rate 73 BPM    Atrial Rate 73 BPM    P-R Interval 148 ms    QRS Duration 90 ms    Q-T Interval 430 ms    QTC Calculation (Bezet) 473 ms    Calculated P Axis 54 degrees    Calculated R Axis -8 degrees    Calculated T Axis 27 degrees    Diagnosis       Normal sinus rhythm  Inferior infarct , age undetermined  Anterior infarct (cited on or before 17-MAY-2020)  When compared with ECG of 04-APR-2019 10:00,  Inferior infarct is now present     CBC WITH AUTOMATED DIFF    Collection Time: 05/17/20  6:05 PM   Result Value Ref Range    WBC 9.2 3.6 - 11.0 K/uL    RBC 4.66 3.80 - 5.20 M/uL    HGB 13.5 11.5 - 16.0 g/dL    HCT 40.3 35.0 - 47.0 %    MCV 86.5 80.0 - 99.0 FL    MCH 29.0 26.0 - 34.0 PG    MCHC 33.5 30.0 - 36.5 g/dL    RDW 14.3 11.5 - 14.5 %    PLATELET 970 621 - 378 K/uL    MPV 10.7 8.9 - 12.9 FL    NRBC 0.0 0  WBC    ABSOLUTE NRBC 0.00 0.00 - 0.01 K/uL    NEUTROPHILS 62 32 - 75 %    LYMPHOCYTES 30 12 - 49 %    MONOCYTES 6 5 - 13 %    EOSINOPHILS 2 0 - 7 %    BASOPHILS 0 0 - 1 %    IMMATURE GRANULOCYTES 0 0.0 - 0.5 %    ABS. NEUTROPHILS 5.7 1.8 - 8.0 K/UL    ABS. LYMPHOCYTES 2.7 0.8 - 3.5 K/UL    ABS. MONOCYTES 0.5 0.0 - 1.0 K/UL    ABS. EOSINOPHILS 0.2 0.0 - 0.4 K/UL    ABS. BASOPHILS 0.0 0.0 - 0.1 K/UL    ABS. IMM.  GRANS. 0.0 0.00 - 0.04 K/UL    DF AUTOMATED     METABOLIC PANEL, COMPREHENSIVE    Collection Time: 05/17/20  6:05 PM   Result Value Ref Range    Sodium 140 136 - 145 mmol/L    Potassium 2.8 (L) 3.5 - 5.1 mmol/L    Chloride 107 97 - 108 mmol/L    CO2 25 21 - 32 mmol/L    Anion gap 8 5 - 15 mmol/L    Glucose 132 (H) 65 - 100 mg/dL    BUN 17 6 - 20 MG/DL    Creatinine 0. 74 0.55 - 1.02 MG/DL    BUN/Creatinine ratio 23 (H) 12 - 20      GFR est AA >60 >60 ml/min/1.73m2    GFR est non-AA >60 >60 ml/min/1.73m2    Calcium 8.0 (L) 8.5 - 10.1 MG/DL    Bilirubin, total 0.3 0.2 - 1.0 MG/DL    ALT (SGPT) 26 12 - 78 U/L    AST (SGOT) 17 15 - 37 U/L    Alk. phosphatase 69 45 - 117 U/L    Protein, total 7.6 6.4 - 8.2 g/dL    Albumin 3.5 3.5 - 5.0 g/dL    Globulin 4.1 (H) 2.0 - 4.0 g/dL    A-G Ratio 0.9 (L) 1.1 - 2.2     CK W/ REFLX CKMB    Collection Time: 05/17/20  6:05 PM   Result Value Ref Range    CK 92 26 - 192 U/L   TROPONIN I    Collection Time: 05/17/20  6:05 PM   Result Value Ref Range    Troponin-I, Qt. 0.15 (H) <0.05 ng/mL   TROPONIN I    Collection Time: 05/17/20  8:35 PM   Result Value Ref Range    Troponin-I, Qt. 0.17 (H) <0.05 ng/mL       Radiologic Studies -   XR CHEST PA LAT   Final Result   IMPRESSION:   No acute process. CT Results  (Last 48 hours)    None        CXR Results  (Last 48 hours)               05/17/20 1748  XR CHEST PA LAT Final result    Impression:  IMPRESSION:   No acute process. Narrative:  INDICATION: chest pain       COMPARISON: May 22, 2018       FINDINGS:       Frontal and lateral views of the chest demonstrate a normal cardiomediastinal   silhouette. The lungs are adequately expanded. There is no edema, effusion,   consolidation, or pneumothorax. The osseous structures are unremarkable. Medical Decision Making   I am the first provider for this patient. I reviewed the vital signs, available nursing notes, past medical history, past surgical history, family history and social history. Vital Signs-Reviewed the patient's vital signs.   Patient Vitals for the past 24 hrs:   Temp Pulse Resp BP SpO2   05/17/20 1926 98.3 °F (36.8 °C) 69 20 125/60 95 %   05/17/20 1830 -- 70 13 128/55 95 %   05/17/20 1800 -- 73 14 135/79 97 %   05/17/20 1646 99 °F (37.2 °C) 85 18 129/58 --       Pulse Oximetry Analysis -97 % on room air    Cardiac Monitor:   Rate: 85 bpm  Rhythm: Normal Sinus Rhythm        Records Reviewed: Nursing Notes and Old Medical Records    Differential Diagnosis:    Patient presents with CP. DDx:  ACS, Aortic dissection, PNA, PE, PTX, pericarditis, myocarditis, GERD, costochondritis, anxiety. Provider Notes (Medical Decision Making):   64-year-old female with known coronary disease presenting with chest pain that radiated down both arms with a mildly elevated troponin. Discussed the case with Dr. Marcio Martinez from cardiology who agrees with giving the patient Lovenox and admitted for serial troponin levels. She is currently chest pain-free. She may require further stress testing versus cardiac catheterization in the morning. ED Course:     Initial assessment performed. The patients presenting problems have been discussed, and they are in agreement with the care plan formulated and outlined with them. I have encouraged them to ask questions as they arise throughout their visit. Critical Care Time:     I have spent 35 minutes of critical care time in evaluating and treating this patient. This includes time spent at bedside, time with family and decision makers, documentation, review of labs and imaging, and/or consultation with specialists. It does not include time spent on separately billed procedures. This patient presents with a critical illness or injury that acutely impairs one or more vital organ systems such that there is a high probability of imminent or life threatening deterioration in the patient's condition. This case involved decision making of high complexity to assess, manipulate, and support vital organ system failure and/or to prevent further life threatening deterioration of the patient's condition.  Failure to initiate these interventions on an urgent basis would likely result in sudden, clinically significant or life threatening deterioration in the patient's condition. Abnormal findings supporting critical care: Elevated troponin, signs of cardiac ischemia  Interventions to support critical care: Anticoagulation  Failure to intervene may result in: Worsening cardiac function and/or death      Disposition:  Admit Note:  11:20 PM  Pt is being admitted by Dr Bharati Ramos. The results of their tests and reason(s) for their admission have been discussed with pt and/or available family. They convey agreement and understanding for the need to be admitted and for admission diagnosis. PLAN:  1. Current Discharge Medication List        2. Follow-up Information    None       Return to ED if worse     Diagnosis     Clinical Impression:   1.  NSTEMI (non-ST elevated myocardial infarction) ([facility] [address]

## 2020-05-18 NOTE — PROGRESS NOTES
SNOW: Discharge home with follow up appointment   Son will transport at discharge   Pt has capability to complete virtual follow up appointments    Reason for Admission:   NSTEMI                   RUR Score:      11% Low (Patient has  Moderate risk for readmission due to recently having Heart Attack)               Plan for utilizing home health:   Has not used HH in the past.  Will address HH if recommended at time of discharge       PCP: First and Last name:  Kianna Adair   Name of Practice:    Are you a current patient: Yes/No:   yes   Approximate date of last visit:   March 2020                    Current Advanced Directive/Advance Care Plan:  No AMD, patient willing to consider ACP with PCP                         Transition of Care Plan:        Home with follow up services    Pt is a 36year old female admitted to [facility] for diagnosis of NSTEMI. Pt alert and oriented, able to verify demographics. Pt resides in a 1 level home with 4 steps to enter with her  Tiffany Ponce ([phone]) and her son. Pt is her main caregiver and is caregiver of her  who is disabled. Pt reports that she drives and her son will provide transport when ready to discharge. She confirms that Dr. Kianna Adair is her PCP and last saw him in March 2020. Pt has [place] Medicaid. No DME in the home for herself. She was independent with all ADLs prior to admission. Pt plans to return home at discharge. She denies any SNF or HH servcies in the past.  Pt confirms that she has access to a phone to complete virtual follow up if needed. Care Management Interventions  PCP Verified by CM: Yes(March 2020)  Mode of Transport at Discharge: Other (see comment)  Current Support Network: Lives with Spouse  Confirm Follow Up Transport: Family  Discharge Location  Discharge Placement: Home with outpatient services    Madi Palomares.  Saurabh Baldwin, [address] - [facility]  Advanced Steps ACP Facilitator  Zone Phone: [phone] Mobile: [phone]

## 2020-05-18 NOTE — ED NOTES
Pt resting on inpatient bed in NAD. Pt denies any pain or tingling at present. States she can move independently, remove self from monitor and walk to BR. Not wearing oxygen while awake.   Reports she has hx of sleep apnea

## 2020-05-19 LAB
ANION GAP SERPL CALC-SCNC: 7 MMOL/L (ref 5–15)
BUN SERPL-MCNC: 13 MG/DL (ref 6–20)
BUN/CREAT SERPL: 20 (ref 12–20)
CALCIUM SERPL-MCNC: 8.2 MG/DL (ref 8.5–10.1)
CHLORIDE SERPL-SCNC: 107 MMOL/L (ref 97–108)
CO2 SERPL-SCNC: 27 MMOL/L (ref 21–32)
CREAT SERPL-MCNC: 0.64 MG/DL (ref 0.55–1.02)
ERYTHROCYTE [DISTWIDTH] IN BLOOD BY AUTOMATED COUNT: 14.6 % (ref 11.5–14.5)
GLUCOSE BLD STRIP.AUTO-MCNC: 115 MG/DL (ref 65–100)
GLUCOSE BLD STRIP.AUTO-MCNC: 126 MG/DL (ref 65–100)
GLUCOSE BLD STRIP.AUTO-MCNC: 164 MG/DL (ref 65–100)
GLUCOSE BLD STRIP.AUTO-MCNC: 175 MG/DL (ref 65–100)
GLUCOSE SERPL-MCNC: 120 MG/DL (ref 65–100)
HCT VFR BLD AUTO: 41.5 % (ref 35–47)
HGB BLD-MCNC: 13.4 G/DL (ref 11.5–16)
MAGNESIUM SERPL-MCNC: 1.7 MG/DL (ref 1.6–2.4)
MCH RBC QN AUTO: 28.5 PG (ref 26–34)
MCHC RBC AUTO-ENTMCNC: 32.3 G/DL (ref 30–36.5)
MCV RBC AUTO: 88.3 FL (ref 80–99)
NRBC # BLD: 0 K/UL (ref 0–0.01)
NRBC BLD-RTO: 0 PER 100 WBC
PLATELET # BLD AUTO: 255 K/UL (ref 150–400)
PMV BLD AUTO: 11.2 FL (ref 8.9–12.9)
POTASSIUM SERPL-SCNC: 3.3 MMOL/L (ref 3.5–5.1)
RBC # BLD AUTO: 4.7 M/UL (ref 3.8–5.2)
SERVICE CMNT-IMP: ABNORMAL
SODIUM SERPL-SCNC: 141 MMOL/L (ref 136–145)
WBC # BLD AUTO: 8 K/UL (ref 3.6–11)

## 2020-05-19 PROCEDURE — 36415 COLL VENOUS BLD VENIPUNCTURE: CPT

## 2020-05-19 PROCEDURE — 85027 COMPLETE CBC AUTOMATED: CPT

## 2020-05-19 PROCEDURE — C1894 INTRO/SHEATH, NON-LASER: HCPCS | Performed by: INTERNAL MEDICINE

## 2020-05-19 PROCEDURE — C1769 GUIDE WIRE: HCPCS | Performed by: INTERNAL MEDICINE

## 2020-05-19 PROCEDURE — 93458 L HRT ARTERY/VENTRICLE ANGIO: CPT | Performed by: INTERNAL MEDICINE

## 2020-05-19 PROCEDURE — C1725 CATH, TRANSLUMIN NON-LASER: HCPCS | Performed by: INTERNAL MEDICINE

## 2020-05-19 PROCEDURE — 74011250637 HC RX REV CODE- 250/637: Performed by: HOSPITALIST

## 2020-05-19 PROCEDURE — 74011636637 HC RX REV CODE- 636/637: Performed by: INTERNAL MEDICINE

## 2020-05-19 PROCEDURE — 85347 COAGULATION TIME ACTIVATED: CPT

## 2020-05-19 PROCEDURE — 77030019697 HC SYR ANGI INFL MRTM -B: Performed by: INTERNAL MEDICINE

## 2020-05-19 PROCEDURE — 65660000000 HC RM CCU STEPDOWN

## 2020-05-19 PROCEDURE — C1874 STENT, COATED/COV W/DEL SYS: HCPCS | Performed by: INTERNAL MEDICINE

## 2020-05-19 PROCEDURE — 76937 US GUIDE VASCULAR ACCESS: CPT | Performed by: INTERNAL MEDICINE

## 2020-05-19 PROCEDURE — C1887 CATHETER, GUIDING: HCPCS | Performed by: INTERNAL MEDICINE

## 2020-05-19 PROCEDURE — 80048 BASIC METABOLIC PNL TOTAL CA: CPT

## 2020-05-19 PROCEDURE — 77030002996 HC SUT SLK J&J -A: Performed by: INTERNAL MEDICINE

## 2020-05-19 PROCEDURE — 74011250637 HC RX REV CODE- 250/637: Performed by: INTERNAL MEDICINE

## 2020-05-19 PROCEDURE — 74011250636 HC RX REV CODE- 250/636: Performed by: INTERNAL MEDICINE

## 2020-05-19 PROCEDURE — 92928 PRQ TCAT PLMT NTRAC ST 1 LES: CPT | Performed by: INTERNAL MEDICINE

## 2020-05-19 PROCEDURE — 83735 ASSAY OF MAGNESIUM: CPT

## 2020-05-19 PROCEDURE — 92943 PRQ TRLUML REVSC CH OCC ANT: CPT | Performed by: INTERNAL MEDICINE

## 2020-05-19 PROCEDURE — 027136Z DILATION OF CORONARY ARTERY, TWO ARTERIES WITH THREE DRUG-ELUTING INTRALUMINAL DEVICES, PERCUTANEOUS APPROACH: ICD-10-PCS | Performed by: INTERNAL MEDICINE

## 2020-05-19 PROCEDURE — 77030018729 HC ELECTRD DEFIB PAD CARD -B: Performed by: INTERNAL MEDICINE

## 2020-05-19 PROCEDURE — C1760 CLOSURE DEV, VASC: HCPCS | Performed by: INTERNAL MEDICINE

## 2020-05-19 PROCEDURE — 99153 MOD SED SAME PHYS/QHP EA: CPT | Performed by: INTERNAL MEDICINE

## 2020-05-19 PROCEDURE — 77030029065 HC DRSG HEMO QCLOT ZMED -B: Performed by: INTERNAL MEDICINE

## 2020-05-19 PROCEDURE — 93454 CORONARY ARTERY ANGIO S&I: CPT

## 2020-05-19 PROCEDURE — B2111ZZ FLUOROSCOPY OF MULTIPLE CORONARY ARTERIES USING LOW OSMOLAR CONTRAST: ICD-10-PCS | Performed by: INTERNAL MEDICINE

## 2020-05-19 PROCEDURE — 93005 ELECTROCARDIOGRAM TRACING: CPT

## 2020-05-19 PROCEDURE — 74011000250 HC RX REV CODE- 250: Performed by: INTERNAL MEDICINE

## 2020-05-19 PROCEDURE — 82962 GLUCOSE BLOOD TEST: CPT

## 2020-05-19 PROCEDURE — 99152 MOD SED SAME PHYS/QHP 5/>YRS: CPT | Performed by: INTERNAL MEDICINE

## 2020-05-19 DEVICE — STENT RONYX20030UX RESOLUTE ONYX 2.00X30
Type: IMPLANTABLE DEVICE | Status: FUNCTIONAL
Brand: RESOLUTE ONYX™

## 2020-05-19 DEVICE — STENT RONYX22530UX RESOLUTE ONYX 2.25X30
Type: IMPLANTABLE DEVICE | Status: FUNCTIONAL
Brand: RESOLUTE ONYX™

## 2020-05-19 RX ORDER — LIDOCAINE HYDROCHLORIDE 10 MG/ML
INJECTION, SOLUTION EPIDURAL; INFILTRATION; INTRACAUDAL; PERINEURAL AS NEEDED
Status: DISCONTINUED | OUTPATIENT
Start: 2020-05-19 | End: 2020-05-19 | Stop reason: HOSPADM

## 2020-05-19 RX ORDER — HEPARIN SODIUM 200 [USP'U]/100ML
INJECTION, SOLUTION INTRAVENOUS
Status: COMPLETED | OUTPATIENT
Start: 2020-05-19 | End: 2020-05-19

## 2020-05-19 RX ORDER — FENTANYL CITRATE 50 UG/ML
INJECTION, SOLUTION INTRAMUSCULAR; INTRAVENOUS AS NEEDED
Status: DISCONTINUED | OUTPATIENT
Start: 2020-05-19 | End: 2020-05-19 | Stop reason: HOSPADM

## 2020-05-19 RX ORDER — POTASSIUM CHLORIDE 20 MEQ/1
40 TABLET, EXTENDED RELEASE ORAL
Status: COMPLETED | OUTPATIENT
Start: 2020-05-19 | End: 2020-05-19

## 2020-05-19 RX ORDER — HEPARIN SODIUM 1000 [USP'U]/ML
INJECTION, SOLUTION INTRAVENOUS; SUBCUTANEOUS AS NEEDED
Status: DISCONTINUED | OUTPATIENT
Start: 2020-05-19 | End: 2020-05-19 | Stop reason: HOSPADM

## 2020-05-19 RX ORDER — MIDAZOLAM HYDROCHLORIDE 1 MG/ML
INJECTION, SOLUTION INTRAMUSCULAR; INTRAVENOUS AS NEEDED
Status: DISCONTINUED | OUTPATIENT
Start: 2020-05-19 | End: 2020-05-19 | Stop reason: HOSPADM

## 2020-05-19 RX ADMIN — ISOSORBIDE MONONITRATE 10 MG: 10 TABLET ORAL at 10:29

## 2020-05-19 RX ADMIN — Medication 10 ML: at 13:23

## 2020-05-19 RX ADMIN — TICAGRELOR 90 MG: 90 TABLET ORAL at 10:29

## 2020-05-19 RX ADMIN — INSULIN LISPRO 2 UNITS: 100 INJECTION, SOLUTION INTRAVENOUS; SUBCUTANEOUS at 08:44

## 2020-05-19 RX ADMIN — LISINOPRIL 5 MG: 5 TABLET ORAL at 08:45

## 2020-05-19 RX ADMIN — POTASSIUM CHLORIDE 40 MEQ: 1500 TABLET, EXTENDED RELEASE ORAL at 18:12

## 2020-05-19 RX ADMIN — METOPROLOL TARTRATE 100 MG: 50 TABLET, FILM COATED ORAL at 08:45

## 2020-05-19 RX ADMIN — ASPIRIN 81 MG 81 MG: 81 TABLET ORAL at 08:45

## 2020-05-19 RX ADMIN — ATORVASTATIN CALCIUM 40 MG: 40 TABLET, FILM COATED ORAL at 08:45

## 2020-05-19 RX ADMIN — INSULIN LISPRO 2 UNITS: 100 INJECTION, SOLUTION INTRAVENOUS; SUBCUTANEOUS at 23:48

## 2020-05-19 RX ADMIN — Medication 10 ML: at 03:33

## 2020-05-19 RX ADMIN — TICAGRELOR 90 MG: 90 TABLET ORAL at 23:48

## 2020-05-19 NOTE — PROGRESS NOTES
Problem: Diabetes Self-Management  Goal: *Disease process and treatment process  Description: Define diabetes and identify own type of diabetes; list 3 options for treating diabetes. 5/19/2020 0504 by Guy Clay RN  Outcome: Progressing Towards Goal  5/18/2020 1958 by Guy Clay RN  Outcome: Progressing Towards Goal  Goal: *Incorporating nutritional management into lifestyle  Description: Describe effect of type, amount and timing of food on blood glucose; list 3 methods for planning meals.   Outcome: Progressing Towards Goal

## 2020-05-19 NOTE — PROGRESS NOTES
07:00 - Bedside report rec'd from DANIEL Hines.  NPO for cath today. 12:42 - Still awaiting cath. Patient aware of late schedule due to emergency earlier. 13:36 - Patient to CCL. 15:33 - Rec'd patient from [address] [place] [address]. ROSA BUENO. 19:00 - Bedside report given to DANIEL Hines.

## 2020-05-19 NOTE — PROGRESS NOTES
Pt noted to have 2 episodes of bradycardia in the 30's. Each time pt advise this is normal when she does not wear CPAP. She stated she had been taking the O2 off all night and that she does not wear the CPAP consistently at home.   Will make day RN aware to pass on to MD.

## 2020-05-19 NOTE — PROGRESS NOTES
Progress Note      5/19/2020 9:04 AM  NAME: German Araiza   MRN:  [id number]   Admit Diagnosis: NSTEMI (non-ST elevated myocardial infarction) ([address] [I21.4]      Problem List:     1. Indeterminate troponin elevation  2. Coronary artery disease. NSTEMI 3/20 w/ cath @ [facility]. RCA  attempt (wire would cross only); OM2 lesion and a diagonal lesion. 3. Diabetes  4. Hypertension  5. Hypokalemia  6. EM on CPAPP  7. GERD  8. Hyperlipidemia  9. Tobacco abuse  10. Cardiologist:  [place]       Assessment/Plan:   TnI peak @ 0.2     11. NPO for cath today  12. Continue ASA   13. Continue ticagrelor  14. Continue metoprolol  15. Continue ISMN  16. Continue lisinopril  17. Continue statin         [x]       High complexity decision making was performed in this patient at high risk for decompensation with multiple organ involvement. Subjective: German Araiza denies chest pain, dyspnea. Discussed with RN events overnight. Review of Systems:    Symptom Y/N Comments  Symptom Y/N Comments   Fever/Chills N   Chest Pain N    Poor Appetite N   Edema N    Cough N   Abdominal Pain N    Sputum N   Joint Pain N    SOB/ALVAREZ N   Pruritis/Rash N    Nausea/vomit N   Tolerating PT/OT Y    Diarrhea N   Tolerating Diet Y    Constipation N   Other       Could NOT obtain due to:      Objective:      Physical Exam:    Last 24hrs VS reviewed since prior progress note. Most recent are:    Visit Vitals  /69   Pulse 61   Temp 98.2 °F (36.8 °C)   Resp 18   Ht 5' 5\" (1.651 m)   Wt 104.7 kg (230 lb 13.2 oz)   LMP  (LMP Unknown)   SpO2 92%   BMI 38.41 kg/m²     No intake or output data in the 24 hours ending 05/19/20 0904     General Appearance: Well developed, well nourished, alert & oriented x 3,    no acute distress. Ears/Nose/Mouth/Throat: Hearing grossly normal.  Neck: Supple. Chest: Lungs clear to auscultation bilaterally. Cardiovascular: Regular rate and rhythm, S1S2 normal, no murmur.   Abdomen: Soft, non-tender, bowel sounds are active. Extremities: No edema bilaterally. Skin: Warm and dry. []         Post-cath site without hematoma, bruit, tenderness, or thrill. Distal pulses intact. PMH/SH reviewed - no change compared to H&P    Data Review    Telemetry: sinus rhythm     EKG:   [x]  No new EKG for review    Lab Data Personally Reviewed:    Recent Labs     05/19/20  0330 05/18/20  0336   WBC 8.0 8.8   HGB 13.4 13.0   HCT 41.5 39.1    252     No results for input(s): INR, PTP, APTT, INREXT in the last 72 hours. Recent Labs     05/19/20  0330 05/18/20  0336 05/17/20  1805    140 140   K 3.3* 3.3* 2.8*    109* 107   CO2 27 26 25   BUN 13 15 17   CREA 0.64 0.65 0.74   * 122* 132*   CA 8.2* 8.2* 8.0*   MG 1.7  --   --      Recent Labs     05/18/20  0947 05/18/20  0336 05/17/20  2035   TROIQ 0.16* 0.15* 0.17*     Lab Results   Component Value Date/Time    Cholesterol, total 99 05/18/2020 03:36 AM    HDL Cholesterol 30 05/18/2020 03:36 AM    LDL, calculated 51.4 05/18/2020 03:36 AM    Triglyceride 88 05/18/2020 03:36 AM    CHOL/HDL Ratio 3.3 05/18/2020 03:36 AM       Recent Labs     05/17/20  1805   SGOT 17   AP 69   TP 7.6   ALB 3.5   GLOB 4.1*     No results for input(s): PH, PCO2, PO2 in the last 72 hours.     Medications Personally Reviewed:    Current Facility-Administered Medications   Medication Dose Route Frequency    aspirin chewable tablet 81 mg  81 mg Oral DAILY    butalbital-acetaminophen-caffeine (FIORICET, ESGIC) -40 mg per tablet 1 Tab  1 Tab Oral Q4H PRN    [Held by provider] hydroCHLOROthiazide (HYDRODIURIL) tablet 25 mg  25 mg Oral DAILY    isosorbide mononitrate (ISMO, MONOKET) tablet 10 mg  10 mg Oral DAILY    metoprolol tartrate (LOPRESSOR) tablet 100 mg  100 mg Oral DAILY    ticagrelor (BRILINTA) tablet 90 mg  90 mg Oral BID    insulin lispro (HUMALOG) injection   SubCUTAneous AC&HS    glucose chewable tablet 16 g  4 Tab Oral PRN    dextrose (D50W) injection syrg 12.5-25 g  12.5-25 g IntraVENous PRN    glucagon (GLUCAGEN) injection 1 mg  1 mg IntraMUSCular PRN    sodium chloride (NS) flush 5-40 mL  5-40 mL IntraVENous Q8H    sodium chloride (NS) flush 5-40 mL  5-40 mL IntraVENous PRN    acetaminophen (TYLENOL) tablet 650 mg  650 mg Oral Q6H PRN    morphine 10 mg/ml injection 1 mg  1 mg IntraVENous Q4H PRN    nitroglycerin (NITROSTAT) tablet 0.4 mg  0.4 mg SubLINGual Q5MIN PRN    lisinopriL (PRINIVIL, ZESTRIL) tablet 5 mg  5 mg Oral DAILY    atorvastatin (LIPITOR) tablet 40 mg  40 mg Oral DAILY         Chas Brennan III, DO

## 2020-05-19 NOTE — PROGRESS NOTES
Hospitalist Progress Note    NAME: Jett Vernon   :  1960   MRN:  [id number]     Interim Hospital Summary: 62 y.o. female whom presented on 2020 with      Assessment / Plan:    NSTEMI  -Patient presents with tightness and numbness across the chest radiating to arms bilaterally  -troponin mildly elevated with flat trend  -S/P recent unsuccessful PCI 3/20 at [facility]  -continue DAPT, BB statin  -cardiology input appreciated. -cath on : JES to RCA and OM      Type 2 DM (A1c 7.1)  -Metformin and glipizide on hold for now   -Use sliding scale insulin in anticipation for left heart catheterization     Hypertension  -Continue metoprolol, added lisinopril 5 mg daily. Adjust prn  -Hold hydrochlorothiazide due to hypokalemia     Hypokalemia:  -Hold HCTZ  -will replace after Cath today     Headache  -frontal pressure. Seems like muscle tension type from stress  -try fioricet prn     Dyslipidemia:  -continue lipitor     Tobacco abuse:  -Patient reports he has cut down from 1 pack a day to 3 cigarettes a day           Code Status: Full code  Surrogate Decision Maker:  Melinda Mancia and daughter Abad Lino     DVT Prophylaxis: Patient received Lovenox  GI Prophylaxis: not indicated       30.0 - 39.9 Obese / Body mass index is 38.41 kg/m². Subjective:     Chief Complaint / Reason for Physician Visit  Follow up of NSTEMI, DM, HTN  No chest pain, n/v noted. Anxious to know when her Cath is happening   Chart reviewed in detail. Discussed with RN events overnight. Review of Systems:  Symptom Y/N Comments  Symptom Y/N Comments   Fever/Chills    Chest Pain     Poor Appetite    Edema     Cough    Abdominal Pain     Sputum    Joint Pain     SOB/ALVAREZ    Pruritis/Rash     Nausea/vomit    Tolerating PT/OT     Diarrhea    Tolerating Diet     Constipation    Other       Could NOT obtain due to:      PO intake: No data found.   Objective:     VITALS:   Last 24hrs VS reviewed since prior progress note. Most recent are:  Patient Vitals for the past 24 hrs:   Temp Pulse Resp BP SpO2   05/19/20 1141 -- (!) 57 -- -- --   05/19/20 1122 98.6 °F (37 °C) (!) 50 18 111/68 93 %   05/19/20 1029 -- (!) 56 -- 142/80 --   05/19/20 0959 -- (!) 52 -- -- --   05/19/20 0850 -- 61 -- -- --   05/19/20 0720 98.2 °F (36.8 °C) 62 18 141/69 92 %   05/19/20 0328 97.8 °F (36.6 °C) 81 18 138/54 93 %   05/18/20 2231 98.6 °F (37 °C) 60 18 130/65 94 %   05/18/20 1924 98.4 °F (36.9 °C) (!) 57 18 123/47 94 %     No intake or output data in the 24 hours ending 05/19/20 1224     PHYSICAL EXAM:  General: WD, WN. Alert, cooperative, no acute distress    EENT:  EOMI. Anicteric sclerae. MMM  Resp:  CTA bilaterally, no wheezing or rales. No accessory muscle use  CV:  Regular  rhythm,  No edema  GI:  Soft, Non distended, Non tender.  +Bowel sounds  Neurologic:  Alert and oriented X 3, normal speech,   Psych:   Good insight. Not anxious nor agitated  Skin:  No rashes. No jaundice    Reviewed most current lab test results and cultures  YES  Reviewed most current radiology test results   YES  Review and summation of old records today    NO  Reviewed patient's current orders and MAR    YES  PMH/SH reviewed - no change compared to H&P  ________________________________________________________________________  Care Plan discussed with:    Comments   Patient x    Family      RN     Care Manager     Consultant                        Multidiciplinary team rounds were held today with , nursing, pharmacist and clinical coordinator. Patient's plan of care was discussed; medications were reviewed and discharge planning was addressed.      ________________________________________________________________________  Total NON critical care TIME:  25   Minutes    Total CRITICAL CARE TIME Spent:   Minutes non procedure based      Comments   >50% of visit spent in counseling and coordination of care x     This includes time during multidisciplinary rounds if indicated above   ________________________________________________________________________  Michelle Valenzuela MD     Procedures: see electronic medical records for all procedures/Xrays and details which were not copied into this note but were reviewed prior to creation of Plan. LABS:  I reviewed today's most current labs and imaging studies.   Pertinent labs include:  Recent Labs     05/19/20  0330 05/18/20  0336 05/17/20  1805   WBC 8.0 8.8 9.2   HGB 13.4 13.0 13.5   HCT 41.5 39.1 40.3    252 270     Recent Labs     05/19/20  0330 05/18/20  0336 05/17/20  1805    140 140   K 3.3* 3.3* 2.8*    109* 107   CO2 27 26 25   * 122* 132*   BUN 13 15 17   CREA 0.64 0.65 0.74   CA 8.2* 8.2* 8.0*   MG 1.7  --   --    ALB  --   --  3.5   TBILI  --   --  0.3   SGOT  --   --  17   ALT  --   --  26

## 2020-05-20 VITALS
HEIGHT: 65 IN | WEIGHT: 230.82 LBS | SYSTOLIC BLOOD PRESSURE: 125 MMHG | HEART RATE: 64 BPM | DIASTOLIC BLOOD PRESSURE: 50 MMHG | RESPIRATION RATE: 18 BRPM | TEMPERATURE: 97.9 F | BODY MASS INDEX: 38.46 KG/M2 | OXYGEN SATURATION: 94 %

## 2020-05-20 LAB
ACT BLD: 224 SECS (ref 79–138)
ANION GAP SERPL CALC-SCNC: 5 MMOL/L (ref 5–15)
ATRIAL RATE: 50 BPM
BUN SERPL-MCNC: 13 MG/DL (ref 6–20)
BUN/CREAT SERPL: 22 (ref 12–20)
CALCIUM SERPL-MCNC: 8.7 MG/DL (ref 8.5–10.1)
CALCULATED P AXIS, ECG09: 43 DEGREES
CALCULATED R AXIS, ECG10: 73 DEGREES
CALCULATED T AXIS, ECG11: 7 DEGREES
CHLORIDE SERPL-SCNC: 106 MMOL/L (ref 97–108)
CO2 SERPL-SCNC: 27 MMOL/L (ref 21–32)
CREAT SERPL-MCNC: 0.59 MG/DL (ref 0.55–1.02)
DIAGNOSIS, 93000: NORMAL
ERYTHROCYTE [DISTWIDTH] IN BLOOD BY AUTOMATED COUNT: 14.4 % (ref 11.5–14.5)
GLUCOSE BLD STRIP.AUTO-MCNC: 159 MG/DL (ref 65–100)
GLUCOSE SERPL-MCNC: 121 MG/DL (ref 65–100)
HCT VFR BLD AUTO: 41.3 % (ref 35–47)
HGB BLD-MCNC: 13.4 G/DL (ref 11.5–16)
MCH RBC QN AUTO: 28.5 PG (ref 26–34)
MCHC RBC AUTO-ENTMCNC: 32.4 G/DL (ref 30–36.5)
MCV RBC AUTO: 87.9 FL (ref 80–99)
NRBC # BLD: 0 K/UL (ref 0–0.01)
NRBC BLD-RTO: 0 PER 100 WBC
P-R INTERVAL, ECG05: 174 MS
PLATELET # BLD AUTO: 237 K/UL (ref 150–400)
PMV BLD AUTO: 11 FL (ref 8.9–12.9)
POTASSIUM SERPL-SCNC: 3.6 MMOL/L (ref 3.5–5.1)
Q-T INTERVAL, ECG07: 488 MS
QRS DURATION, ECG06: 78 MS
QTC CALCULATION (BEZET), ECG08: 444 MS
RBC # BLD AUTO: 4.7 M/UL (ref 3.8–5.2)
SERVICE CMNT-IMP: ABNORMAL
SODIUM SERPL-SCNC: 138 MMOL/L (ref 136–145)
VENTRICULAR RATE, ECG03: 50 BPM
WBC # BLD AUTO: 7.2 K/UL (ref 3.6–11)

## 2020-05-20 PROCEDURE — 74011250637 HC RX REV CODE- 250/637: Performed by: INTERNAL MEDICINE

## 2020-05-20 PROCEDURE — 74011636637 HC RX REV CODE- 636/637: Performed by: INTERNAL MEDICINE

## 2020-05-20 PROCEDURE — 85027 COMPLETE CBC AUTOMATED: CPT

## 2020-05-20 PROCEDURE — 80048 BASIC METABOLIC PNL TOTAL CA: CPT

## 2020-05-20 PROCEDURE — 36415 COLL VENOUS BLD VENIPUNCTURE: CPT

## 2020-05-20 PROCEDURE — 74011636320 HC RX REV CODE- 636/320: Performed by: INTERNAL MEDICINE

## 2020-05-20 PROCEDURE — 82962 GLUCOSE BLOOD TEST: CPT

## 2020-05-20 PROCEDURE — 77010033678 HC OXYGEN DAILY

## 2020-05-20 RX ORDER — POTASSIUM CHLORIDE 750 MG/1
40 TABLET, FILM COATED, EXTENDED RELEASE ORAL
Status: COMPLETED | OUTPATIENT
Start: 2020-05-20 | End: 2020-05-20

## 2020-05-20 RX ADMIN — ISOSORBIDE MONONITRATE 10 MG: 10 TABLET ORAL at 08:56

## 2020-05-20 RX ADMIN — Medication 10 ML: at 03:35

## 2020-05-20 RX ADMIN — INSULIN LISPRO 2 UNITS: 100 INJECTION, SOLUTION INTRAVENOUS; SUBCUTANEOUS at 08:56

## 2020-05-20 RX ADMIN — ATORVASTATIN CALCIUM 40 MG: 40 TABLET, FILM COATED ORAL at 08:56

## 2020-05-20 RX ADMIN — METOPROLOL TARTRATE 100 MG: 50 TABLET, FILM COATED ORAL at 08:56

## 2020-05-20 RX ADMIN — POTASSIUM CHLORIDE 40 MEQ: 750 TABLET, FILM COATED, EXTENDED RELEASE ORAL at 08:56

## 2020-05-20 RX ADMIN — LISINOPRIL 5 MG: 5 TABLET ORAL at 08:56

## 2020-05-20 RX ADMIN — ASPIRIN 81 MG 81 MG: 81 TABLET ORAL at 08:56

## 2020-05-20 RX ADMIN — TICAGRELOR 90 MG: 90 TABLET ORAL at 08:57

## 2020-05-20 NOTE — PROGRESS NOTES
Problem: Diabetes Self-Management  Goal: *Disease process and treatment process  Description: Define diabetes and identify own type of diabetes; list 3 options for treating diabetes. Outcome: Progressing Towards Goal     Problem: Falls - Risk of  Goal: *Absence of Falls  Description: Document Gary Rivera Fall Risk and appropriate interventions in the flowsheet.   Outcome: Progressing Towards Goal  Note: Fall Risk Interventions:            Medication Interventions: Evaluate medications/consider consulting pharmacy, Patient to call before getting OOB, Teach patient to arise slowly

## 2020-05-20 NOTE — DISCHARGE INSTRUCTIONS
[address] [facility], [address]   [phone]  [address]    [email or web address]    Patient Discharge Instructions    Tere Valles / [id number] : 1960    Admitted 2020 Discharged: 2020       · It is important that you take the medication exactly as they are prescribed. · Keep your medication in the bottles provided by the pharmacist and keep a list of the medication names, dosages, and times to be taken in your wallet. · Do not take other medications without consulting your doctor. BRING ALL OF YOUR MEDICINES TO YOUR OFFICE VISIT with Luis Antonio Tejeda DO or my nurse Rick miranda Done. .    Follow-up with Luis Antonio Tejeda DO or my nurse practitionerRick Done in 2 weeks. Cardiac Catheterization  Discharge Instructions    Transradial Catheterization Discharge Instructions (WRIST)    Discharge instructions: Your radial artery in your wrist was used for your cardiac catheterization. This site may be slightly bruised and sore following your procedure. Expect mild tingling or the hand and tenderness at the puncture site for up to 3 days. Excess movement of the wrist used should be avoided for the next 24-48 hours. 1. No lifting over 2 pounds (approximately a ½ gallon of milk) with this arm for 24 hours. 2. Keep the site of the procedure covered with a bandage for 24 hours. 3. You may shower the day after your procedure. Do not take a tub bath or submerge the puncture site in water for 48 hours. 4. No heavy impact activity/lifting > 30 pounds for 1 week. If bleeding of the wrist occurs at home:   If the site on your wrist where you had the catheterization procedure begins to bleed, do not panic. 1. Place 1 or 2 fingers over the puncture site and hold pressure to stop the bleeding. You may be able to feel your pulse as you hold pressure. 2. Lift your fingers after 5 minutes to see if the bleeding has stopped.    3. Once the bleeding has stopped, gently wipe the wrist area clean and cover with a bandage. If the bleeding from your wrist does not stop after 15 minutes, or if there is a large amount of bleeding or spurting, call 911 immediately (do not drive yourself to the hospital). Other concerns: The site may be slightly bruised and sore following your procedure. Should any of the following occur, contact your physician immediately:   1. Any cool or coldness of the arm, discoloration over a large area, ongoing numbness or any abnormal sensations , moderate to severe pain or swelling in the arm. 2. Redness, soreness, swelling, chills or fever, or colored drainage at the procedure site within 3-7 days after your procedure. If you have any further questions or concerns regarding your procedure please call the Cardiac Cath Lab office at [phone]. During regular business hours ask to speak to Dr. Aleena Michelle. During non-business hours the answering service will answer. Ask to speak to the physician on call for [facility].     Transfemoral Catheterization Discharge Instructions Arnol Chakraborty)     Do not drive, operate any machinery, or sign any legal documents for 24 hours after your procedure. You must have someone to drive you home.  You may take a shower 24 hours after your cardiac catheterization. Be sure to get the dressing wet and then remove it; gently wash the area with warm soapy water. Pat dry and leave open to air. To help prevent infections, be sure to keep the cath site clean and dry. No lotions, creams, powders, ointments, etc. in the cath site for approximately 1 week.  Do not take a tub bath, get in a hot tub or swimming pool for approximately 5 days or until the cath site is completely healed.  No strenuous activity or heavy lifting over 10 lbs. for 7 days.  Drink plenty of fluids for 24-48 hours after your cath to flush the contrast dye from your kidneys.  No alcoholic beverages for 24 hours. You may resume your previous diet (low fat, low cholesterol) after your cath.  After your cath, some bruising or discomfort is common during the healing process. Tylenol, 1-2 tablets every 6 hours as needed, is recommended if you experience any discomfort. If you experience any signs or symptoms of infection such as fever, chills, or poorly healing incision, persistent tenderness or swelling in the groin, redness and/or warmth to the touch, numbness, significant tingling or pain at the groin site or affected extremity, rash, drainage from the cath site, or if the leg feels tight or swollen, call your physician right away.  If bleeding at the cath site occurs, take a clean gauze pad and apply direct pressure to the groin just above the puncture site. Call 911 immediately, and continue to apply direct pressure until an ambulance gets to your location.  You may return to work  2  days after your cardiac cath if no groin bleeding. Information obtained by :  I understand that if any problems occur once I am at home I am to contact my physician. I understand and acknowledge receipt of the instructions indicated above. R.N.'s Signature                                                                  Date/Time                                                                                                                                              Patient or Representative Signature                                                          Date/Time      [facility] III, DO             [address] [facility], [address] [facility]    [phone]  [place], [facility] [address]    [email or web address]                [address]   [facility], [address]    [phone]  [place], [address]    [email or web address]    Patient Discharge Instructions    Daisy Valenzuela Harry Hernandez / [id number] : 1960    Admitted 2020 Discharged: 2020       · It is important that you take the medication exactly as they are prescribed. · Keep your medication in the bottles provided by the pharmacist and keep a list of the medication names, dosages, and times to be taken in your wallet. · Do not take other medications without consulting your doctor. BRING ALL OF YOUR MEDICINES TO YOUR OFFICE VISIT. Follow-up with Allie Rojo DO or my nurse practitioner, Bjorn Bland in 2 weeks. Follow-up with your primary care physician in one week. Heart Attack: After Your Hospitalization     Your Care Instructions    A heart attack (myocardial infarction, or MI) occurs when one or more of the coronary arteries, which supply the heart with oxygen-rich blood, is blocked. A blockage usually occurs when plaque inside the artery breaks open and a blood clot forms in the artery. After a heart attack, you may be worried about your future. Over the next several weeks, your heart will start to heal. Although it is sometimes hard to break old habits, you can prevent another heart attack by making some lifestyle changes and by taking medicines. You may use the following information for ideas about what to do at home to speed your recovery. Follow-up care is a key part of your treatment and safety. Be sure to make and go to all appointments, and call your doctor if you are having problems. It is also a good idea to keep a list of the medicines you take, including dose. How can you care for yourself at home? Activity  Increase your activities slowly. Take short rest breaks when you get tired. As you are able, get more exercise. Walking is a good choice. Bit by bit, increase the amount you walk every day. Try for at least 30 minutes on most days of the week. You also may want to swim, bike, or do other activities. Cardiac rehabilitation (rehab) program is strongly recommended. Cardiac rehab includes supervised exercise, help with diet and lifestyle changes, and emotional support. It may reduce your risk of future heart problems. Do not drive until your doctor says you can. You can have sex when you are strong enough. This usually means when you can easily walk around or climb stairs. Talk with your doctor if you have any concerns. Do not take sildenafil citrate (Viagra), tadalafil (Cialis), or vardenafil (Levitra) if you are taking nitroglycerin. Lifestyle changes  Do not smoke. Smoking increases your risk of another heart attack. If you need help quitting, talk to your doctor about stop-smoking programs and medicines. These can increase your chances of quitting for good. Eat a heart-healthy diet that is low in cholesterol, saturated fat, and salt, and is full of fruits, vegetables and whole-grains. Eat at least two servings of fish each week. You may get more details about how to eat healthy, but these tips can help you get started. Our website has more information:  [email or web address]  Avoid colds and flu. Get a pneumococcal vaccine shot. If you have had one before, ask your primary care doctor whether you need a second dose. Get a flu shot every fall. If you must be around people with colds or flu, wash your hands often. Medicines  Take your medicines exactly as prescribed. Call your doctor if you think you are having a problem with your medicine. You may need several medicines. Angiotensin-converting enzyme (ACE) inhibitors, beta-blockers, and statins can help prevent another heart attack. ACE inhibitors control your blood pressure, and statins help lower cholesterol. Aspirin and other blood thinners help prevent blood clots. Blood clots can cause a stroke or heart attack. If Plavix is prescribed, you must take it to prevent your stent from clotting. You will likely need the plavix for at least 1 to 2 years.   If your doctor has given you nitroglycerin, keep it with you at all times. If you have chest pain, sit down and rest, and take the first dose of nitroglycerin if the discomfort does not resolve. If chest pain gets worse or is not getting better within 5 minutes, call 911 immediately. Stay on the phone with the emergency ; he or she will give you further instructions. Be sure to tell your doctor about any chest pain you have had, even if it went away. Do not take any over-the-counter medicines, vitamins, or herbal products without talking to your doctor first.    Mental health  Talk to your family, friends, or a counselor about your feelings. It is normal to feel frightened, angry, hopeless, helpless, and even guilty. Talking openly about bad feelings can help you cope. If the blues last, talk to your primary care doctor. When should you call for help? Call 911 anytime you think you may need emergency care. For example, call if:  You have signs of a heart attack. These may include:  Chest pain or pressure. Sweating. Shortness of breath. Nausea or vomiting. Pain that spreads from the chest to the neck, jaw, or one or both shoulders or arms. Dizziness or lightheadedness. A fast or irregular pulse. After calling 911, chew 1 adult-strength aspirin. Wait for an ambulance. Do not try to drive yourself. You passed out (lost consciousness). You feel like you are having another heart attack. Call your doctor now or seek immediate medical care if:  You have had any chest pain, even if it has gone away. You have new or increased shortness of breath. You are dizzy or lightheaded, or you feel like you may faint. Watch closely for changes in your health, and be sure to contact your doctor if you have any problems, including gaining 5 pounds or more. Cardiac Catheterization  Discharge Instructions     You may take a shower. Be sure to get the dressing wet and then remove it; gently wash the area with warm soapy water. Pat dry and leave open to air.   To help prevent infections, be sure to keep the cath site clean and dry. No lotions, creams, powders, ointments, etc. in the cath site for approximately 1 week.  Do not take a tub bath, get in a hot tub or swimming pool for approximately 5 days or until the cath site is completely healed.  No strenuous activity or heavy lifting over 10 lbs. for 7 days.  After your cath, some bruising or discomfort is common during the healing process. Tylenol, 1-2 tablets every 6 hours as needed, is recommended if you experience any discomfort. If you experience any signs or symptoms of infection such as fever, chills, or poorly healing incision, persistent tenderness or swelling in the groin, redness and/or warmth to the touch, numbness, significant tingling or pain at the groin site or affected extremity, rash, drainage from the cath site, or if the leg feels tight or swollen, call your physician right away.  If bleeding at the cath site occurs, take a clean gauze pad and apply direct pressure to the groin just above the puncture site. Call 911 immediately, and continue to apply direct pressure until an ambulance gets to your location. Information obtained by :  I understand that if any problems occur once I am at home I am to contact my physician. I understand and acknowledge receipt of the instructions indicated above. R.N.'s Signature                                                                  Date/Time                                                                                                                                              Patient or Representative Signature                                                          Date/Time      Ivy Monroe III, DO      Where can you learn more? Go to [email or web address]            [address] [facility], [address]   [phone]  [place], [address] [facility]    [email or web address]          Smoking Cessation Program:   This is a free, phone/text/email based, smoking cessation program. The program is individualized to meet each patient's needs. To enroll use this link [email or web address]

## 2020-05-20 NOTE — PROGRESS NOTES
Progress Note      5/20/2020 9:04 AM  NAME: Darin Aguayo   MRN:  [id number]   Admit Diagnosis: NSTEMI (non-ST elevated myocardial infarction) ([facility] [address] [I21.4]      Problem List:     1. Indeterminate troponin elevation  2. Coronary artery disease. NSTEMI 3/20 w/ cath @ [facility]. RCA  attempt (wire would cross only); OM2 lesion and a diagonal lesion. 3. Diabetes  4. Hypertension  5. Hypokalemia  6. EM on CPAPP  7. GERD  8. Hyperlipidemia  9. Tobacco abuse  10. Cardiologist:  [place]       Assessment/Plan:   TnI peak @ 0.2    Cath 5/19 w/ PCI of RCA  w/ 2 JES and OM2 w/ 1 JES.    11. Continue ASA   12. Continue ticagrelor  13. Continue metoprolol  14. Continue ISMN  15. Continue lisinopril  16. Continue statin  17. See me in 2 weeks  18. OK w/ discharge         [x]       High complexity decision making was performed in this patient at high risk for decompensation with multiple organ involvement. Subjective: Darin Aguayo denies chest pain, dyspnea. Discussed with RN events overnight. Review of Systems:    Symptom Y/N Comments  Symptom Y/N Comments   Fever/Chills N   Chest Pain N    Poor Appetite N   Edema N    Cough N   Abdominal Pain N    Sputum N   Joint Pain N    SOB/ALVAREZ N   Pruritis/Rash N    Nausea/vomit N   Tolerating PT/OT Y    Diarrhea N   Tolerating Diet Y    Constipation N   Other       Could NOT obtain due to:      Objective:      Physical Exam:    Last 24hrs VS reviewed since prior progress note. Most recent are:    Visit Vitals  /49   Pulse (!) 57   Temp 98.3 °F (36.8 °C)   Resp 16   Ht 5' 5\" (1.651 m)   Wt 104.7 kg (230 lb 13.2 oz)   LMP  (LMP Unknown)   SpO2 93%   BMI 38.41 kg/m²       Intake/Output Summary (Last 24 hours) at 5/20/2020 0700  Last data filed at 5/19/2020 2352  Gross per 24 hour   Intake --   Output 400 ml   Net -400 ml        General Appearance: Well developed, well nourished, alert & oriented x 3,    no acute distress.   Ears/Nose/Mouth/Throat: Hearing grossly normal.  Neck: Supple. Chest: Lungs clear to auscultation bilaterally. Cardiovascular: Regular rate and rhythm, S1S2 normal, no murmur. Abdomen: Soft, non-tender, bowel sounds are active. Extremities: No edema bilaterally. Skin: Warm and dry. [x]         Post-cath site without hematoma, bruit, tenderness, or thrill. Distal pulses intact. PMH/ reviewed - no change compared to H&P    Data Review    Telemetry: sinus rhythm     EKG:   [x]  No new EKG for review    Lab Data Personally Reviewed:    Recent Labs     05/20/20  0333 05/19/20 0330   WBC 7.2 8.0   HGB 13.4 13.4   HCT 41.3 41.5    255     No results for input(s): INR, PTP, APTT, INREXT, INREXT in the last 72 hours. Recent Labs     05/20/20  0333 05/19/20  0330 05/18/20  0336    141 140   K 3.6 3.3* 3.3*    107 109*   CO2 27 27 26   BUN 13 13 15   CREA 0.59 0.64 0.65   * 120* 122*   CA 8.7 8.2* 8.2*   MG  --  1.7  --      Recent Labs     05/18/20  0947 05/18/20  0336 05/17/20  2035   TROIQ 0.16* 0.15* 0.17*     Lab Results   Component Value Date/Time    Cholesterol, total 99 05/18/2020 03:36 AM    HDL Cholesterol 30 05/18/2020 03:36 AM    LDL, calculated 51.4 05/18/2020 03:36 AM    Triglyceride 88 05/18/2020 03:36 AM    CHOL/HDL Ratio 3.3 05/18/2020 03:36 AM       Recent Labs     05/17/20  1805   SGOT 17   AP 69   TP 7.6   ALB 3.5   GLOB 4.1*     No results for input(s): PH, PCO2, PO2 in the last 72 hours.     Medications Personally Reviewed:    Current Facility-Administered Medications   Medication Dose Route Frequency    potassium chloride SR (KLOR-CON 10) tablet 40 mEq  40 mEq Oral NOW    aspirin chewable tablet 81 mg  81 mg Oral DAILY    butalbital-acetaminophen-caffeine (FIORICET, ESGIC) -40 mg per tablet 1 Tab  1 Tab Oral Q4H PRN    [Held by provider] hydroCHLOROthiazide (HYDRODIURIL) tablet 25 mg  25 mg Oral DAILY    isosorbide mononitrate (ISMO, MONOKET) tablet 10 mg  10 mg Oral DAILY    metoprolol tartrate (LOPRESSOR) tablet 100 mg  100 mg Oral DAILY    ticagrelor (BRILINTA) tablet 90 mg  90 mg Oral BID    insulin lispro (HUMALOG) injection   SubCUTAneous AC&HS    glucose chewable tablet 16 g  4 Tab Oral PRN    dextrose (D50W) injection syrg 12.5-25 g  12.5-25 g IntraVENous PRN    glucagon (GLUCAGEN) injection 1 mg  1 mg IntraMUSCular PRN    sodium chloride (NS) flush 5-40 mL  5-40 mL IntraVENous Q8H    sodium chloride (NS) flush 5-40 mL  5-40 mL IntraVENous PRN    acetaminophen (TYLENOL) tablet 650 mg  650 mg Oral Q6H PRN    morphine 10 mg/ml injection 1 mg  1 mg IntraVENous Q4H PRN    nitroglycerin (NITROSTAT) tablet 0.4 mg  0.4 mg SubLINGual Q5MIN PRN    lisinopriL (PRINIVIL, ZESTRIL) tablet 5 mg  5 mg Oral DAILY    atorvastatin (LIPITOR) tablet 40 mg  40 mg Oral DAILY         Jim Meng III, DO

## 2020-05-20 NOTE — DISCHARGE SUMMARY
Discharge Summary     Patient:  Capri Hernandez       MRN: [id number]       YOB: 1960       Age: 62 y.o. Date of admission:  5/17/2020    Date of discharge:  5/20/2020    Primary care provider: Dr. Melvi Pulliam MD    Admitting provider:  Boyd Slaughter MD    Discharging provider:  Octavia Zaragoza MD - [phone]  If unavailable, call [phone] and ask the  to page the triage hospitalist.    Consultations  · IP CONSULT TO CARDIOLOGY    Procedures  · Procedure(s):  · LEFT HEART CATH / CORONARY ANGIOGRAPHY  · Percutaneous Coronary Intervention  · Ultrasound Guided Vascular Access  · Insert Stent Adrian Coronary  · Chronic Total Occlusion Coronary Intervention    Discharge destination: HOME. The patient is stable for discharge. Admission diagnosis  · NSTEMI (non-ST elevated myocardial infarction) [facility]) [I21.4]    Discharge Medication List as of 5/20/2020  8:50 AM      CONTINUE these medications which have NOT CHANGED    Details   ISOSORBIDE MONONITRATE PO Take 30 mg by mouth daily. , Historical Med      ticagrelor (BRILINTA) 90 mg tablet Take 90 mg by mouth two (2) times a day., Historical Med      metoprolol tartrate (LOPRESSOR) 100 mg IR tablet Take 100 mg by mouth daily. , Historical Med      atorvastatin (LIPITOR) 80 mg tablet Take 80 mg by mouth nightly., Historical Med      metFORMIN ER (GLUCOPHAGE XR) 750 mg tablet Take 750 mg by mouth two (2) times a day., Historical Med      pantoprazole (PROTONIX) 40 mg tablet Take 40 mg by mouth daily. , Historical Med      amLODIPine (NORVASC) 10 mg tablet Take 10 mg by mouth daily. , Historical Med      aspirin delayed-release 81 mg tablet Take  by mouth daily. , Historical Med      lisinopriL (PRINIVIL, ZESTRIL) 40 mg tablet Take 40 mg by mouth daily. , Historical Med      spironolactone (ALDACTONE) 25 mg tablet Take 25 mg by mouth daily. , Historical Med metoprolol succinate (TOPROL-XL) 100 mg tablet Take 100 mg by mouth two (2) times a day., Historical Med      glipiZIDE (GLUCOTROL) 5 mg tablet Take 5 mg by mouth daily. , Historical Med         STOP taking these medications       hydroCHLOROthiazide (HYDRODIURIL) 25 mg tablet Comments:   Reason for Stopping: Follow-up Information     Follow up With Specialties Details Why Contact Info    Jose Angel Hooper III, DO Cardiology Schedule an appointment as soon as possible for a visit in 2 weeks Office will call you to schedule follow up appt. [address]  [phone] [facility]      Tiffanie Robles MD Internal Medicine Schedule an appointment as soon as possible for a visit in 1 week for PCP post hospital follow up appt. [address]. [phone]            Final discharge diagnoses and brief hospital course  Please also refer to the admission H&P for details on the presenting problem. 61 yo female with CAD, DM,  HTN, HLD, tobacco abuse admitted for chest pain. NSTEMI  -Patient presents with tightness and numbness across the chest radiating to arms bilaterally  -troponin mildly elevated with flat trend  -S/P recent unsuccessful PCI 3/20 at [address]  -continue DAPT, BB statin  -cardiology input appreciated. -cath on 5/19: JES to RCA and OM      Type 2 DM (A1c 7.1)  -Metformin and glipizide      Hypertension  -Continue metoprolol, added lisinopril 5 mg daily. Adjust prn  -d/c hydrochlorothiazide      Hypokalemia:  -replaced      Headache  -frontal pressure.   Seems like muscle tension type from stress  -try fioricet prn     Dyslipidemia:  -continue lipitor     Tobacco abuse:  -Patient reports he has cut down from 1 pack a day to 3 cigarettes a day       Physical examination at discharge  Visit Vitals  /50   Pulse 64   Temp 97.9 °F (36.6 °C)   Resp 18   Ht 5' 5\" (1.651 m)   Wt 104.7 kg (230 lb 13.2 oz)   SpO2 94%   BMI 38.41 kg/m²     Pt not examined as discharged prior to my visit     Pertinent imaging studies:    Cath:    Cath 5/19 w/ PCI of RCA  w/ 2 JES and OM2 w/ 1 JES. Recent Labs     05/20/20  0333 05/19/20  0330 05/18/20  0336   WBC 7.2 8.0 8.8   HGB 13.4 13.4 13.0   HCT 41.3 41.5 39.1    255 252     Recent Labs     05/20/20  0333 05/19/20  0330 05/18/20  0336    141 140   K 3.6 3.3* 3.3*    107 109*   CO2 27 27 26   BUN 13 13 15   CREA 0.59 0.64 0.65   * 120* 122*   CA 8.7 8.2* 8.2*   MG  --  1.7  --      Recent Labs     05/17/20  1805   SGOT 17   AP 69   TP 7.6   ALB 3.5   GLOB 4.1*     No results for input(s): INR, PTP, APTT, INREXT in the last 72 hours. No results for input(s): FE, TIBC, PSAT, FERR in the last 72 hours. No results for input(s): PH, PCO2, PO2 in the last 72 hours. No results for input(s): CPK, CKMB in the last 72 hours. No lab exists for component: TROPONINI  No components found for: [place]    Chronic Diagnoses:    Problem List as of 5/20/2020 Date Reviewed: 4/30/2019          Codes Class Noted - Resolved    NSTEMI (non-ST elevated myocardial infarction) ([facility].) ICD-10-CM: I21.4  ICD-9-CM: 410.70  5/17/2020 - Present        Breast cyst, left ICD-10-CM: N60.02  ICD-9-CM: 610.0  3/13/2019 - Present        Severe obesity ([facility].) ICD-10-CM: E66.01  ICD-9-CM: 278.01  2/27/2019 - Present        UTI (urinary tract infection) ICD-10-CM: N39.0  ICD-9-CM: 599.0  5/24/2018 - Present        Liver mass ICD-10-CM: R16.0  ICD-9-CM: 573.8  5/24/2018 - Present        Type II diabetes mellitus ([facility] ICD-10-CM: E11.9  ICD-9-CM: 250.00  5/24/2018 - Present        Pyelonephritis ICD-10-CM: N12  ICD-9-CM: 590.80  5/20/2018 - Present              Time spent on discharge related activities today greater than 30 minutes.       Signed:  Ephraim Farooq MD                 Hospitalist, Internal Medicine      Cc: Michelle Savage MD

## 2020-05-20 NOTE — PROGRESS NOTES
SNOW Plan:     DC Home today. Pt did not want to wait for me to make PCP appt. . Pt will make virtual appt with PCP at home. Cardiology office will call Pt directly to schedule follow up appt. Son is here to transport Pt home now     No further CM needs.      Suzy Ruiz, [employer]  Ext [phone]

## 2020-05-20 NOTE — PROGRESS NOTES
Problem: Diabetes Self-Management  Goal: *Disease process and treatment process  Description: Define diabetes and identify own type of diabetes; list 3 options for treating diabetes. Outcome: Progressing Towards Goal     Problem: Falls - Risk of  Goal: *Absence of Falls  Description: Document Josue Alexis Fall Risk and appropriate interventions in the flowsheet.   5/20/2020 0513 by Fabiola Brown RN  Outcome: Progressing Towards Goal  Note: Fall Risk Interventions:            Medication Interventions: Evaluate medications/consider consulting pharmacy, Patient to call before getting OOB, Teach patient to arise slowly                5/19/2020 2047 by Fabiola Brown RN  Outcome: Progressing Towards Goal  Note: Fall Risk Interventions:            Medication Interventions: Evaluate medications/consider consulting pharmacy, Patient to call before getting OOB, Teach patient to arise slowly

## 2020-05-21 ENCOUNTER — PATIENT OUTREACH (OUTPATIENT)
Dept: FAMILY MEDICINE CLINIC | Age: 60
End: 2020-05-21

## 2020-08-13 ENCOUNTER — HOSPITAL ENCOUNTER (INPATIENT)
Age: 60
LOS: 2 days | Discharge: HOME | DRG: 174 | End: 2020-08-15
Attending: STUDENT IN AN ORGANIZED HEALTH CARE EDUCATION/TRAINING PROGRAM | Admitting: HOSPITALIST
Payer: MEDICAID

## 2020-08-13 ENCOUNTER — APPOINTMENT (OUTPATIENT)
Dept: GENERAL RADIOLOGY | Age: 60
DRG: 174 | End: 2020-08-13
Attending: EMERGENCY MEDICINE
Payer: MEDICAID

## 2020-08-13 DIAGNOSIS — I21.4 NSTEMI (NON-ST ELEVATED MYOCARDIAL INFARCTION) (HCC): ICD-10-CM

## 2020-08-13 DIAGNOSIS — R07.9 CHEST PAIN, UNSPECIFIED TYPE: Primary | ICD-10-CM

## 2020-08-13 LAB
ALBUMIN SERPL-MCNC: 3.3 G/DL (ref 3.5–5)
ALBUMIN/GLOB SERPL: 0.8 {RATIO} (ref 1.1–2.2)
ALP SERPL-CCNC: 61 U/L (ref 45–117)
ALT SERPL-CCNC: 30 U/L (ref 12–78)
ANION GAP SERPL CALC-SCNC: 5 MMOL/L (ref 5–15)
AST SERPL-CCNC: 19 U/L (ref 15–37)
BASOPHILS # BLD: 0 K/UL (ref 0–0.1)
BASOPHILS NFR BLD: 0 % (ref 0–1)
BILIRUB SERPL-MCNC: 0.4 MG/DL (ref 0.2–1)
BUN SERPL-MCNC: 12 MG/DL (ref 6–20)
BUN/CREAT SERPL: 18 (ref 12–20)
CALCIUM SERPL-MCNC: 7.9 MG/DL (ref 8.5–10.1)
CHLORIDE SERPL-SCNC: 106 MMOL/L (ref 97–108)
CK SERPL-CCNC: 79 U/L (ref 26–192)
CO2 SERPL-SCNC: 28 MMOL/L (ref 21–32)
CREAT SERPL-MCNC: 0.65 MG/DL (ref 0.55–1.02)
DIFFERENTIAL METHOD BLD: ABNORMAL
EOSINOPHIL # BLD: 0.2 K/UL (ref 0–0.4)
EOSINOPHIL NFR BLD: 2 % (ref 0–7)
ERYTHROCYTE [DISTWIDTH] IN BLOOD BY AUTOMATED COUNT: 14.8 % (ref 11.5–14.5)
GLOBULIN SER CALC-MCNC: 4 G/DL (ref 2–4)
GLUCOSE SERPL-MCNC: 108 MG/DL (ref 65–100)
HCT VFR BLD AUTO: 40.4 % (ref 35–47)
HGB BLD-MCNC: 13.2 G/DL (ref 11.5–16)
IMM GRANULOCYTES # BLD AUTO: 0 K/UL (ref 0–0.04)
IMM GRANULOCYTES NFR BLD AUTO: 0 % (ref 0–0.5)
LYMPHOCYTES # BLD: 3.2 K/UL (ref 0.8–3.5)
LYMPHOCYTES NFR BLD: 43 % (ref 12–49)
MCH RBC QN AUTO: 29.2 PG (ref 26–34)
MCHC RBC AUTO-ENTMCNC: 32.7 G/DL (ref 30–36.5)
MCV RBC AUTO: 89.4 FL (ref 80–99)
MONOCYTES # BLD: 0.4 K/UL (ref 0–1)
MONOCYTES NFR BLD: 6 % (ref 5–13)
NEUTS SEG # BLD: 3.7 K/UL (ref 1.8–8)
NEUTS SEG NFR BLD: 49 % (ref 32–75)
NRBC # BLD: 0 K/UL (ref 0–0.01)
NRBC BLD-RTO: 0 PER 100 WBC
PLATELET # BLD AUTO: 252 K/UL (ref 150–400)
PMV BLD AUTO: 10.6 FL (ref 8.9–12.9)
POTASSIUM SERPL-SCNC: 3 MMOL/L (ref 3.5–5.1)
PROT SERPL-MCNC: 7.3 G/DL (ref 6.4–8.2)
RBC # BLD AUTO: 4.52 M/UL (ref 3.8–5.2)
SODIUM SERPL-SCNC: 139 MMOL/L (ref 136–145)
TROPONIN I SERPL-MCNC: 0.11 NG/ML
WBC # BLD AUTO: 7.6 K/UL (ref 3.6–11)

## 2020-08-13 PROCEDURE — 85025 COMPLETE CBC W/AUTO DIFF WBC: CPT

## 2020-08-13 PROCEDURE — 74011250637 HC RX REV CODE- 250/637: Performed by: STUDENT IN AN ORGANIZED HEALTH CARE EDUCATION/TRAINING PROGRAM

## 2020-08-13 PROCEDURE — 84484 ASSAY OF TROPONIN QUANT: CPT

## 2020-08-13 PROCEDURE — 80053 COMPREHEN METABOLIC PANEL: CPT

## 2020-08-13 PROCEDURE — 65270000029 HC RM PRIVATE

## 2020-08-13 PROCEDURE — 93005 ELECTROCARDIOGRAM TRACING: CPT

## 2020-08-13 PROCEDURE — 36415 COLL VENOUS BLD VENIPUNCTURE: CPT

## 2020-08-13 PROCEDURE — 71045 X-RAY EXAM CHEST 1 VIEW: CPT

## 2020-08-13 PROCEDURE — 82550 ASSAY OF CK (CPK): CPT

## 2020-08-13 PROCEDURE — 83036 HEMOGLOBIN GLYCOSYLATED A1C: CPT

## 2020-08-13 PROCEDURE — 99285 EMERGENCY DEPT VISIT HI MDM: CPT

## 2020-08-13 RX ORDER — POTASSIUM CHLORIDE 20 MEQ/1
20 TABLET, EXTENDED RELEASE ORAL
Status: COMPLETED | OUTPATIENT
Start: 2020-08-13 | End: 2020-08-13

## 2020-08-13 RX ORDER — ACETAMINOPHEN 325 MG/1
650 TABLET ORAL
Status: DISCONTINUED | OUTPATIENT
Start: 2020-08-13 | End: 2020-08-14 | Stop reason: SDUPTHER

## 2020-08-13 RX ADMIN — POTASSIUM CHLORIDE 20 MEQ: 20 TABLET, EXTENDED RELEASE ORAL at 22:41

## 2020-08-13 NOTE — Clinical Note
Lesion: Located in the Distal RCA. Stent catheter removed. Multiple inflations used. Stent deployed. First inflation pressure = 12 corrie; inflation time: 20 sec. Second inflation pressure: 12 corrie; inflation time: 10 sec.

## 2020-08-13 NOTE — Clinical Note
Lesion located in the Distal RCA. Balloon inserted. Balloon inflated using multiple inflations inflation technique. Lesion #1: Pressure = 8 corrie; Duration = 15 sec. Inflation 2: Pressure = 8 corrie; Duration = 10 sec.

## 2020-08-13 NOTE — ED NOTES
ED visit d/t Chest pain / headache / intermittent \" sob since last heart attack \" - onset sxs, 1530 08/13/20, chest pain and arm numbness bilat    Presents with central and (L) sided chest pain, intermittent / achy / not constant    Headache, frontal aspect, 4/10 pain scale, after CP and arm numbness presented    Also with bilat hand numbness, reports similar sxs when \" I last had a heart attack\"     A/Ox4 - cardiac monitor x4 - pt with no resp distress noted at this time - NSR on cardiac monitor    Hx of DM / HTN / \"heart attacks in March and May, with 3 stents in place\" - medicated with ASA 81 and Brillenta (prior arrival, normal scheduled medication regimen)    significant other at bedside for eval    2046 - Min-Stephani SUGGS at bedside for eval

## 2020-08-13 NOTE — Clinical Note
TRANSFER - OUT REPORT:     Verbal report given to: Suki Clay. Report consisted of patient's Situation, Background, Assessment and   Recommendations(SBAR). Opportunity for questions and clarification was provided. Patient transported with a Registered Nurse. Patient transported to: [facility].

## 2020-08-14 PROBLEM — I21.4 NSTEMI (NON-ST ELEVATED MYOCARDIAL INFARCTION) (HCC): Status: RESOLVED | Noted: 2020-05-17 | Resolved: 2020-08-14

## 2020-08-14 LAB
ACT BLD: 334 SECS (ref 79–138)
ALBUMIN SERPL-MCNC: 3.1 G/DL (ref 3.5–5)
ALBUMIN/GLOB SERPL: 0.8 {RATIO} (ref 1.1–2.2)
ALP SERPL-CCNC: 49 U/L (ref 45–117)
ALT SERPL-CCNC: 26 U/L (ref 12–78)
ANION GAP SERPL CALC-SCNC: 6 MMOL/L (ref 5–15)
AST SERPL-CCNC: 16 U/L (ref 15–37)
ATRIAL RATE: 65 BPM
BILIRUB SERPL-MCNC: 0.5 MG/DL (ref 0.2–1)
BUN SERPL-MCNC: 10 MG/DL (ref 6–20)
BUN/CREAT SERPL: 15 (ref 12–20)
CALCIUM SERPL-MCNC: 8.1 MG/DL (ref 8.5–10.1)
CALCULATED P AXIS, ECG09: 47 DEGREES
CALCULATED R AXIS, ECG10: 0 DEGREES
CALCULATED T AXIS, ECG11: 18 DEGREES
CHLORIDE SERPL-SCNC: 108 MMOL/L (ref 97–108)
CHOLEST SERPL-MCNC: 102 MG/DL
CO2 SERPL-SCNC: 27 MMOL/L (ref 21–32)
CREAT SERPL-MCNC: 0.66 MG/DL (ref 0.55–1.02)
DIAGNOSIS, 93000: NORMAL
ERYTHROCYTE [DISTWIDTH] IN BLOOD BY AUTOMATED COUNT: 14.7 % (ref 11.5–14.5)
EST. AVERAGE GLUCOSE BLD GHB EST-MCNC: 151 MG/DL
GLOBULIN SER CALC-MCNC: 3.9 G/DL (ref 2–4)
GLUCOSE SERPL-MCNC: 125 MG/DL (ref 65–100)
HBA1C MFR BLD: 6.9 % (ref 4–5.6)
HCT VFR BLD AUTO: 39.8 % (ref 35–47)
HDLC SERPL-MCNC: 37 MG/DL
HDLC SERPL: 2.8 {RATIO} (ref 0–5)
HGB BLD-MCNC: 12.9 G/DL (ref 11.5–16)
LDLC SERPL CALC-MCNC: 46.6 MG/DL (ref 0–100)
LIPID PROFILE,FLP: NORMAL
MCH RBC QN AUTO: 29.2 PG (ref 26–34)
MCHC RBC AUTO-ENTMCNC: 32.4 G/DL (ref 30–36.5)
MCV RBC AUTO: 90 FL (ref 80–99)
NRBC # BLD: 0 K/UL (ref 0–0.01)
NRBC BLD-RTO: 0 PER 100 WBC
P-R INTERVAL, ECG05: 152 MS
PLATELET # BLD AUTO: 248 K/UL (ref 150–400)
PMV BLD AUTO: 11 FL (ref 8.9–12.9)
POTASSIUM SERPL-SCNC: 3.2 MMOL/L (ref 3.5–5.1)
PROT SERPL-MCNC: 7 G/DL (ref 6.4–8.2)
Q-T INTERVAL, ECG07: 442 MS
QRS DURATION, ECG06: 86 MS
QTC CALCULATION (BEZET), ECG08: 459 MS
RBC # BLD AUTO: 4.42 M/UL (ref 3.8–5.2)
SODIUM SERPL-SCNC: 141 MMOL/L (ref 136–145)
TRIGL SERPL-MCNC: 92 MG/DL (ref ?–150)
TROPONIN I SERPL-MCNC: 0.11 NG/ML
TROPONIN I SERPL-MCNC: 0.13 NG/ML
VENTRICULAR RATE, ECG03: 65 BPM
VLDLC SERPL CALC-MCNC: 18.4 MG/DL
WBC # BLD AUTO: 8.2 K/UL (ref 3.6–11)

## 2020-08-14 PROCEDURE — C1894 INTRO/SHEATH, NON-LASER: HCPCS | Performed by: INTERNAL MEDICINE

## 2020-08-14 PROCEDURE — B246ZZ3 ULTRASONOGRAPHY OF RIGHT AND LEFT HEART, INTRAVASCULAR: ICD-10-PCS | Performed by: INTERNAL MEDICINE

## 2020-08-14 PROCEDURE — 02703ZZ DILATION OF CORONARY ARTERY, ONE ARTERY, PERCUTANEOUS APPROACH: ICD-10-PCS | Performed by: INTERNAL MEDICINE

## 2020-08-14 PROCEDURE — 3E033PZ INTRODUCTION OF PLATELET INHIBITOR INTO PERIPHERAL VEIN, PERCUTANEOUS APPROACH: ICD-10-PCS | Performed by: INTERNAL MEDICINE

## 2020-08-14 PROCEDURE — 76937 US GUIDE VASCULAR ACCESS: CPT | Performed by: INTERNAL MEDICINE

## 2020-08-14 PROCEDURE — C1760 CLOSURE DEV, VASC: HCPCS | Performed by: INTERNAL MEDICINE

## 2020-08-14 PROCEDURE — 92928 PRQ TCAT PLMT NTRAC ST 1 LES: CPT | Performed by: INTERNAL MEDICINE

## 2020-08-14 PROCEDURE — 74011000258 HC RX REV CODE- 258: Performed by: INTERNAL MEDICINE

## 2020-08-14 PROCEDURE — 84484 ASSAY OF TROPONIN QUANT: CPT

## 2020-08-14 PROCEDURE — 93005 ELECTROCARDIOGRAM TRACING: CPT

## 2020-08-14 PROCEDURE — 77030002996 HC SUT SLK J&J -A: Performed by: INTERNAL MEDICINE

## 2020-08-14 PROCEDURE — 4A023N7 MEASUREMENT OF CARDIAC SAMPLING AND PRESSURE, LEFT HEART, PERCUTANEOUS APPROACH: ICD-10-PCS | Performed by: INTERNAL MEDICINE

## 2020-08-14 PROCEDURE — B2151ZZ FLUOROSCOPY OF LEFT HEART USING LOW OSMOLAR CONTRAST: ICD-10-PCS | Performed by: INTERNAL MEDICINE

## 2020-08-14 PROCEDURE — 99152 MOD SED SAME PHYS/QHP 5/>YRS: CPT | Performed by: INTERNAL MEDICINE

## 2020-08-14 PROCEDURE — 80061 LIPID PANEL: CPT

## 2020-08-14 PROCEDURE — 85027 COMPLETE CBC AUTOMATED: CPT

## 2020-08-14 PROCEDURE — 77030029065 HC DRSG HEMO QCLOT ZMED -B

## 2020-08-14 PROCEDURE — C1874 STENT, COATED/COV W/DEL SYS: HCPCS | Performed by: INTERNAL MEDICINE

## 2020-08-14 PROCEDURE — 77030013797 HC KT TRNSDUC PRSSR EDWD -A: Performed by: INTERNAL MEDICINE

## 2020-08-14 PROCEDURE — C1769 GUIDE WIRE: HCPCS | Performed by: INTERNAL MEDICINE

## 2020-08-14 PROCEDURE — 027034Z DILATION OF CORONARY ARTERY, ONE ARTERY WITH DRUG-ELUTING INTRALUMINAL DEVICE, PERCUTANEOUS APPROACH: ICD-10-PCS | Performed by: INTERNAL MEDICINE

## 2020-08-14 PROCEDURE — 77030038269 HC DRN EXT URIN PURWCK BARD -A

## 2020-08-14 PROCEDURE — 74011250636 HC RX REV CODE- 250/636: Performed by: HOSPITALIST

## 2020-08-14 PROCEDURE — C1887 CATHETER, GUIDING: HCPCS | Performed by: INTERNAL MEDICINE

## 2020-08-14 PROCEDURE — 99153 MOD SED SAME PHYS/QHP EA: CPT | Performed by: INTERNAL MEDICINE

## 2020-08-14 PROCEDURE — 74011250636 HC RX REV CODE- 250/636: Performed by: INTERNAL MEDICINE

## 2020-08-14 PROCEDURE — 77030019697 HC SYR ANGI INFL MRTM -B: Performed by: INTERNAL MEDICINE

## 2020-08-14 PROCEDURE — 65660000000 HC RM CCU STEPDOWN

## 2020-08-14 PROCEDURE — 85347 COAGULATION TIME ACTIVATED: CPT

## 2020-08-14 PROCEDURE — C1725 CATH, TRANSLUMIN NON-LASER: HCPCS | Performed by: INTERNAL MEDICINE

## 2020-08-14 PROCEDURE — 74011250637 HC RX REV CODE- 250/637: Performed by: INTERNAL MEDICINE

## 2020-08-14 PROCEDURE — 80053 COMPREHEN METABOLIC PANEL: CPT

## 2020-08-14 PROCEDURE — 77030028837 HC SYR ANGI PWR INJ COEU -A: Performed by: INTERNAL MEDICINE

## 2020-08-14 PROCEDURE — 74011636320 HC RX REV CODE- 636/320: Performed by: INTERNAL MEDICINE

## 2020-08-14 PROCEDURE — 74011250637 HC RX REV CODE- 250/637: Performed by: HOSPITALIST

## 2020-08-14 PROCEDURE — 93458 L HRT ARTERY/VENTRICLE ANGIO: CPT | Performed by: INTERNAL MEDICINE

## 2020-08-14 PROCEDURE — 74011000250 HC RX REV CODE- 250: Performed by: INTERNAL MEDICINE

## 2020-08-14 PROCEDURE — 36415 COLL VENOUS BLD VENIPUNCTURE: CPT

## 2020-08-14 PROCEDURE — B2111ZZ FLUOROSCOPY OF MULTIPLE CORONARY ARTERIES USING LOW OSMOLAR CONTRAST: ICD-10-PCS | Performed by: INTERNAL MEDICINE

## 2020-08-14 PROCEDURE — 77030018729 HC ELECTRD DEFIB PAD CARD -B: Performed by: INTERNAL MEDICINE

## 2020-08-14 DEVICE — STENT RONYX20015UX RESOLUTE ONYX 2.00X15
Type: IMPLANTABLE DEVICE | Status: FUNCTIONAL
Brand: RESOLUTE ONYX™

## 2020-08-14 RX ORDER — ENOXAPARIN SODIUM 100 MG/ML
40 INJECTION SUBCUTANEOUS EVERY 24 HOURS
Status: DISCONTINUED | OUTPATIENT
Start: 2020-08-14 | End: 2020-08-15 | Stop reason: HOSPADM

## 2020-08-14 RX ORDER — SODIUM CHLORIDE 0.9 % (FLUSH) 0.9 %
5-40 SYRINGE (ML) INJECTION AS NEEDED
Status: DISCONTINUED | OUTPATIENT
Start: 2020-08-14 | End: 2020-08-15 | Stop reason: HOSPADM

## 2020-08-14 RX ORDER — FENTANYL CITRATE 50 UG/ML
INJECTION, SOLUTION INTRAMUSCULAR; INTRAVENOUS AS NEEDED
Status: DISCONTINUED | OUTPATIENT
Start: 2020-08-14 | End: 2020-08-14 | Stop reason: HOSPADM

## 2020-08-14 RX ORDER — MIDAZOLAM HYDROCHLORIDE 1 MG/ML
INJECTION, SOLUTION INTRAMUSCULAR; INTRAVENOUS AS NEEDED
Status: DISCONTINUED | OUTPATIENT
Start: 2020-08-14 | End: 2020-08-14 | Stop reason: HOSPADM

## 2020-08-14 RX ORDER — HEPARIN SODIUM 200 [USP'U]/100ML
INJECTION, SOLUTION INTRAVENOUS
Status: COMPLETED | OUTPATIENT
Start: 2020-08-14 | End: 2020-08-14

## 2020-08-14 RX ORDER — PANTOPRAZOLE SODIUM 40 MG/1
40 TABLET, DELAYED RELEASE ORAL DAILY
Status: DISCONTINUED | OUTPATIENT
Start: 2020-08-14 | End: 2020-08-15 | Stop reason: HOSPADM

## 2020-08-14 RX ORDER — PROMETHAZINE HYDROCHLORIDE 25 MG/1
12.5 TABLET ORAL
Status: DISCONTINUED | OUTPATIENT
Start: 2020-08-14 | End: 2020-08-15 | Stop reason: HOSPADM

## 2020-08-14 RX ORDER — AMLODIPINE BESYLATE 5 MG/1
10 TABLET ORAL DAILY
Status: DISCONTINUED | OUTPATIENT
Start: 2020-08-14 | End: 2020-08-15 | Stop reason: HOSPADM

## 2020-08-14 RX ORDER — ACETAMINOPHEN 325 MG/1
650 TABLET ORAL
Status: DISCONTINUED | OUTPATIENT
Start: 2020-08-14 | End: 2020-08-15 | Stop reason: HOSPADM

## 2020-08-14 RX ORDER — ACETAMINOPHEN 650 MG/1
650 SUPPOSITORY RECTAL
Status: DISCONTINUED | OUTPATIENT
Start: 2020-08-14 | End: 2020-08-15 | Stop reason: HOSPADM

## 2020-08-14 RX ORDER — ENOXAPARIN SODIUM 150 MG/ML
105 INJECTION SUBCUTANEOUS ONCE
Status: COMPLETED | OUTPATIENT
Start: 2020-08-14 | End: 2020-08-14

## 2020-08-14 RX ORDER — ATORVASTATIN CALCIUM 40 MG/1
80 TABLET, FILM COATED ORAL
Status: DISCONTINUED | OUTPATIENT
Start: 2020-08-14 | End: 2020-08-15 | Stop reason: HOSPADM

## 2020-08-14 RX ORDER — ONDANSETRON 2 MG/ML
4 INJECTION INTRAMUSCULAR; INTRAVENOUS
Status: DISCONTINUED | OUTPATIENT
Start: 2020-08-14 | End: 2020-08-15 | Stop reason: HOSPADM

## 2020-08-14 RX ORDER — LIDOCAINE HYDROCHLORIDE 10 MG/ML
INJECTION INFILTRATION; PERINEURAL AS NEEDED
Status: DISCONTINUED | OUTPATIENT
Start: 2020-08-14 | End: 2020-08-14 | Stop reason: HOSPADM

## 2020-08-14 RX ORDER — FENTANYL CITRATE 50 UG/ML
25 INJECTION, SOLUTION INTRAMUSCULAR; INTRAVENOUS
Status: DISCONTINUED | OUTPATIENT
Start: 2020-08-14 | End: 2020-08-15 | Stop reason: HOSPADM

## 2020-08-14 RX ORDER — METOPROLOL SUCCINATE 50 MG/1
100 TABLET, EXTENDED RELEASE ORAL 2 TIMES DAILY
Status: DISCONTINUED | OUTPATIENT
Start: 2020-08-14 | End: 2020-08-15 | Stop reason: HOSPADM

## 2020-08-14 RX ORDER — SODIUM CHLORIDE 0.9 % (FLUSH) 0.9 %
5-40 SYRINGE (ML) INJECTION EVERY 8 HOURS
Status: DISCONTINUED | OUTPATIENT
Start: 2020-08-14 | End: 2020-08-15 | Stop reason: HOSPADM

## 2020-08-14 RX ORDER — POLYETHYLENE GLYCOL 3350 17 G/17G
17 POWDER, FOR SOLUTION ORAL DAILY PRN
Status: DISCONTINUED | OUTPATIENT
Start: 2020-08-14 | End: 2020-08-15 | Stop reason: HOSPADM

## 2020-08-14 RX ORDER — SPIRONOLACTONE 25 MG/1
25 TABLET ORAL DAILY
Status: DISCONTINUED | OUTPATIENT
Start: 2020-08-14 | End: 2020-08-15 | Stop reason: HOSPADM

## 2020-08-14 RX ORDER — LISINOPRIL 20 MG/1
40 TABLET ORAL DAILY
Status: DISCONTINUED | OUTPATIENT
Start: 2020-08-14 | End: 2020-08-15 | Stop reason: HOSPADM

## 2020-08-14 RX ORDER — ASPIRIN 81 MG/1
81 TABLET ORAL DAILY
Status: DISCONTINUED | OUTPATIENT
Start: 2020-08-14 | End: 2020-08-15 | Stop reason: HOSPADM

## 2020-08-14 RX ORDER — ISOSORBIDE MONONITRATE 30 MG/1
30 TABLET, EXTENDED RELEASE ORAL DAILY
Status: DISCONTINUED | OUTPATIENT
Start: 2020-08-14 | End: 2020-08-15 | Stop reason: HOSPADM

## 2020-08-14 RX ADMIN — Medication 10 ML: at 18:00

## 2020-08-14 RX ADMIN — FENTANYL CITRATE 25 MCG: 50 INJECTION, SOLUTION INTRAMUSCULAR; INTRAVENOUS at 20:38

## 2020-08-14 RX ADMIN — ENOXAPARIN SODIUM 40 MG: 40 INJECTION SUBCUTANEOUS at 22:34

## 2020-08-14 RX ADMIN — FENTANYL CITRATE 25 MCG: 50 INJECTION, SOLUTION INTRAMUSCULAR; INTRAVENOUS at 22:37

## 2020-08-14 RX ADMIN — METOPROLOL SUCCINATE 100 MG: 50 TABLET, EXTENDED RELEASE ORAL at 10:05

## 2020-08-14 RX ADMIN — Medication 10 ML: at 06:00

## 2020-08-14 RX ADMIN — Medication 10 ML: at 22:32

## 2020-08-14 RX ADMIN — ATORVASTATIN CALCIUM 80 MG: 40 TABLET, FILM COATED ORAL at 22:32

## 2020-08-14 RX ADMIN — FENTANYL CITRATE 25 MCG: 50 INJECTION, SOLUTION INTRAMUSCULAR; INTRAVENOUS at 17:56

## 2020-08-14 RX ADMIN — TICAGRELOR 90 MG: 90 TABLET ORAL at 10:05

## 2020-08-14 RX ADMIN — ENOXAPARIN SODIUM 105 MG: 120 INJECTION SUBCUTANEOUS at 01:28

## 2020-08-14 RX ADMIN — LISINOPRIL 40 MG: 20 TABLET ORAL at 10:05

## 2020-08-14 RX ADMIN — ASPIRIN 81 MG: 81 TABLET, COATED ORAL at 10:06

## 2020-08-14 RX ADMIN — PANTOPRAZOLE SODIUM 40 MG: 40 TABLET, DELAYED RELEASE ORAL at 10:06

## 2020-08-14 RX ADMIN — ISOSORBIDE MONONITRATE 30 MG: 30 TABLET, EXTENDED RELEASE ORAL at 10:06

## 2020-08-14 RX ADMIN — AMLODIPINE BESYLATE 10 MG: 5 TABLET ORAL at 10:05

## 2020-08-14 RX ADMIN — ATORVASTATIN CALCIUM 80 MG: 40 TABLET, FILM COATED ORAL at 01:27

## 2020-08-14 RX ADMIN — SPIRONOLACTONE 25 MG: 25 TABLET ORAL at 10:05

## 2020-08-14 RX ADMIN — BIVALIRUDIN 0.5 MG/KG/HR: 250 INJECTION, POWDER, LYOPHILIZED, FOR SOLUTION INTRAVENOUS at 17:00

## 2020-08-14 RX ADMIN — Medication 10 ML: at 00:41

## 2020-08-14 RX ADMIN — METOPROLOL SUCCINATE 100 MG: 50 TABLET, EXTENDED RELEASE ORAL at 17:56

## 2020-08-14 NOTE — DISCHARGE INSTRUCTIONS
[address], [address]   [phone]  [address]    [email or web address]    Patient Discharge Instructions    Kristine Ellis / [id number] : 1960    Admitted 2020 Discharged: 2020       · It is important that you take the medication exactly as they are prescribed. · Keep your medication in the bottles provided by the pharmacist and keep a list of the medication names, dosages, and times to be taken in your wallet. · Do not take other medications without consulting your doctor. BRING ALL OF YOUR MEDICINES TO YOUR OFFICE VISIT with Rayo Leal DO or my nurse practitioner, Pita Ritter. .    Follow-up with Rayo Leal DO or my nurse practitioner, Pita Ritter in 2 weeks. Cardiac Catheterization  Discharge Instructions    Transradial Catheterization Discharge Instructions (WRIST)    Discharge instructions: Your radial artery in your wrist was used for your cardiac catheterization. This site may be slightly bruised and sore following your procedure. Expect mild tingling or the hand and tenderness at the puncture site for up to 3 days. Excess movement of the wrist used should be avoided for the next 24-48 hours. 1. No lifting over 2 pounds (approximately a ½ gallon of milk) with this arm for 24 hours. 2. Keep the site of the procedure covered with a bandage for 24 hours. 3. You may shower the day after your procedure. Do not take a tub bath or submerge the puncture site in water for 48 hours. 4. No heavy impact activity/lifting > 30 pounds for 1 week. If bleeding of the wrist occurs at home:   If the site on your wrist where you had the catheterization procedure begins to bleed, do not panic. 1. Place 1 or 2 fingers over the puncture site and hold pressure to stop the bleeding. You may be able to feel your pulse as you hold pressure. 2. Lift your fingers after 5 minutes to see if the bleeding has stopped.    3. Once the bleeding has stopped, gently wipe the wrist area clean and cover with a bandage. If the bleeding from your wrist does not stop after 15 minutes, or if there is a large amount of bleeding or spurting, call 911 immediately (do not drive yourself to the hospital). Other concerns: The site may be slightly bruised and sore following your procedure. Should any of the following occur, contact your physician immediately:   1. Any cool or coldness of the arm, discoloration over a large area, ongoing numbness or any abnormal sensations , moderate to severe pain or swelling in the arm. 2. Redness, soreness, swelling, chills or fever, or colored drainage at the procedure site within 3-7 days after your procedure. If you have any further questions or concerns regarding your procedure please call the Cardiac Cath Lab office at [phone]. During regular business hours ask to speak to Dr. Abe Sandifer. During non-business hours the answering service will answer. Ask to speak to the physician on call for [place] [facility].     Transfemoral Catheterization Discharge Instructions Leon Kennedy)     Do not drive, operate any machinery, or sign any legal documents for 24 hours after your procedure. You must have someone to drive you home.  You may take a shower 24 hours after your cardiac catheterization. Be sure to get the dressing wet and then remove it; gently wash the area with warm soapy water. Pat dry and leave open to air. To help prevent infections, be sure to keep the cath site clean and dry. No lotions, creams, powders, ointments, etc. in the cath site for approximately 1 week.  Do not take a tub bath, get in a hot tub or swimming pool for approximately 5 days or until the cath site is completely healed.  No strenuous activity or heavy lifting over 10 lbs. for 7 days.  Drink plenty of fluids for 24-48 hours after your cath to flush the contrast dye from your kidneys.  No alcoholic beverages for 24 hours. You may resume your previous diet (low fat, low cholesterol) after your cath.  After your cath, some bruising or discomfort is common during the healing process. Tylenol, 1-2 tablets every 6 hours as needed, is recommended if you experience any discomfort. If you experience any signs or symptoms of infection such as fever, chills, or poorly healing incision, persistent tenderness or swelling in the groin, redness and/or warmth to the touch, numbness, significant tingling or pain at the groin site or affected extremity, rash, drainage from the cath site, or if the leg feels tight or swollen, call your physician right away.  If bleeding at the cath site occurs, take a clean gauze pad and apply direct pressure to the groin just above the puncture site. Call 911 immediately, and continue to apply direct pressure until an ambulance gets to your location.  You may return to work  2  days after your cardiac cath if no groin bleeding. Information obtained by :  I understand that if any problems occur once I am at home I am to contact my physician. I understand and acknowledge receipt of the instructions indicated above. R.N.'s Signature                                                                  Date/Time                                                                                                                                              Patient or Representative Signature                                                          Date/Time      Светлана Goldsmith III, DO             [address] [facility] [address] [facility]    [phone]  [place], [address]    [email or web address]

## 2020-08-14 NOTE — PROGRESS NOTES
Problem: Falls - Risk of  Goal: *Absence of Falls  Description: Document Tasha Pedro Fall Risk and appropriate interventions in the flowsheet.   Outcome: Progressing Towards Goal  Note: Fall Risk Interventions:            Medication Interventions: Teach patient to arise slowly, Patient to call before getting OOB

## 2020-08-14 NOTE — PROGRESS NOTES
TRANSFER - IN REPORT:    Verbal report received from Jose Weber (name) on Brooke Memory  being received from [facility] (unit) for routine progression of care      Report consisted of patients Situation, Background, Assessment and   Recommendations(SBAR). Information from the following report(s) SBAR, Kardex, ED Summary, Mar and Recent results was reviewed with the receiving nurse. Opportunity for questions and clarification was provided. Assessment completed upon patients arrival to unit and care assumed.

## 2020-08-14 NOTE — PROGRESS NOTES
Hospitalist Progress Note    NAME: Bill Griffiths   :  1960   MRN:  [id number]       Assessment / Plan:  Acute non-ST elevation MI coronary artery disease POA patient is scheduled for cardiac catheterization continue ACS protocol. Scheduled for cardiac cath Dr. Ricco Khan. Troponins trended. We will follow-up further recommendations from cardiology after cardiac cath possible discharge tomorrow. Diabetes mellitus type 2 patient was on metformin and glipizide which is being held. Sliding scale. Essential hypertension we will continue blood pressure medications as outpatient monitor blood pressure closely. Hypokalemia replaced. Dyslipidemia we will continue statin. Nicotine abuse disorder patient counseled    Sleep apnea patient is on CPAP at night will continue.      / Body mass index is 40.64 kg/m². Code status: Full  Prophylaxis Lovenox  Recommended Disposition: Home     Subjective:     Chief Complaint / Reason for Physician Visit  . Discussed with RN events overnight. Patient seen and examined at bedside comfortable no fever no chills no nausea no vomiting no chest pain. Patient scheduled for cardiac catheterization. Review of Systems:  Symptom Y/N Comments  Symptom Y/N Comments   Fever/Chills n   Chest Pain n    Poor Appetite n   Edema n    Cough n   Abdominal Pain     Sputum n   Joint Pain     SOB/ALVAREZ n   Pruritis/Rash     Nausea/vomit n   Tolerating PT/OT     Diarrhea n   Tolerating Diet     Constipation n   Other       Could NOT obtain due to:      Objective:     VITALS:   Last 24hrs VS reviewed since prior progress note.  Most recent are:  Patient Vitals for the past 24 hrs:   Temp Pulse Resp BP SpO2   20 0719 98.1 °F (36.7 °C) 62 19 137/70 99 %   20 0215 98.2 °F (36.8 °C) 68 -- 151/75 97 %   20 0030 -- 60 22 133/54 96 %   20 0015 -- 64 18 132/55 97 %   20 2345 -- 60 20 122/50 95 %   20 2330 -- (!) 59 18 121/50 97 %   20 2130 -- (!) 59 19 117/45 97 %   08/13/20 2100 -- 60 14 114/52 98 %   08/13/20 2030 -- 61 21 114/45 97 %   08/13/20 2000 -- 62 20 131/63 99 %   08/13/20 1945 98.4 °F (36.9 °C) 63 18 129/63 97 %   08/13/20 1930 -- 64 20 126/59 96 %   08/13/20 1834 98.4 °F (36.9 °C) 73 18 142/65 98 %     No intake or output data in the 24 hours ending 08/14/20 1308     PHYSICAL EXAM:  General: WD, WN. Alert, cooperative, no acute distress    EENT:  EOMI. Anicteric sclerae. MMM  Resp:  CTA bilaterally, no wheezing or rales. No accessory muscle use  CV:  Regular  rhythm,  No edema  GI:  Soft, Non distended, Non tender.  +Bowel sounds  Neurologic:  Alert and oriented X 3, normal speech,   Psych:   Good insight. Not anxious nor agitated      Reviewed most current lab test results and cultures  YES  Reviewed most current radiology test results   YES  Review and summation of old records today    NO  Reviewed patient's current orders and MAR    YES  PMH/ reviewed - no change compared to H&P  ________________________________________________________________________  Care Plan discussed with:    Comments   Patient     Family      RN     Care Manager     Consultant                        Multidiciplinary team rounds were held today with , nursing, pharmacist and clinical coordinator. Patient's plan of care was discussed; medications were reviewed and discharge planning was addressed. ________________________________________________________________________            Comments   >50% of visit spent in counseling and coordination of care     ________________________________________________________________________  Gaviota Waldron MD     Procedures: see electronic medical records for all procedures/Xrays and details which were not copied into this note but were reviewed prior to creation of Plan. LABS:  I reviewed today's most current labs and imaging studies.   Pertinent labs include:  Recent Labs     08/14/20  0626 08/13/20  1857   WBC 8.2 7.6   HGB 12.9 13.2   HCT 39.8 40.4    252     Recent Labs     08/14/20  0626 08/13/20  1857    139   K 3.2* 3.0*    106   CO2 27 28   * 108*   BUN 10 12   CREA 0.66 0.65   CA 8.1* 7.9*   ALB 3.1* 3.3*   TBILI 0.5 0.4   ALT 26 30       Signed: Willow Valdivia MD

## 2020-08-14 NOTE — PROGRESS NOTES
Called earlier about this patient with bilateral hand numbness which she says occurred with her old MI. 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 She had a cath in 5/2020 by Dr. Lokesh Echeverria with PCI of a chronically occluded RCA fed by LAD collaterals as well as a moderate-sized OM branch with mid-branch stenosis. This procedure was in the setting of chest and bilateral arm discomfort and faintly elevated cardiac markers and a nondiagnostic ECG. I think the reasonable thing to do is give her Lovenox tonight, trend her cardiac markers, and make NPO after MN in case Dr. Lokesh Echeverria decides on a cardiac cath.

## 2020-08-14 NOTE — ED NOTES
TRANSFER - OUT REPORT:    Verbal report given to DANIEL Maradiaga(name) on Gabriela Burns  being transferred to [facility] (unit) for routine progression of care       Report consisted of patients Situation, Background, Assessment and   Recommendations(SBAR). Information from the following report(s) SBAR, Kardex, ED Summary, [facility] and Recent Results was reviewed with the receiving nurse. Lines:   Peripheral IV 08/13/20 Left Antecubital (Active)   Site Assessment Clean, dry, & intact 08/13/20 2000   Phlebitis Assessment 0 08/13/20 2000   Infiltration Assessment 0 08/13/20 2000   Dressing Status Clean, dry, & intact 08/13/20 2000   Dressing Type Tape;Transparent 08/13/20 2000        Opportunity for questions and clarification was provided.       Patient transported with:   Registered Nurse

## 2020-08-14 NOTE — H&P
Hospitalist Admission Note    NAME: Lila Officer   :  1960   MRN:  [id number]     Date/Time:  2020 11:05 PM    Patient PCP: Jarod Denny MD  _____________________________________________________________________  Given the patient's current clinical presentation, I have a high level of concern for decompensation if discharged from the emergency department. Complex decision making was performed, which includes reviewing the patient's available past medical records, laboratory results, and x-ray films. My assessment of this patient's clinical condition and my plan of care is as follows. Assessment / Plan:    Non-ST elevation MI/coronary artery disease POA  -Per Card. Dr. Tompkins Labor  This patient with bilateral hand numbness which she says occurred with her old MI. 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 She had a cath in 2020 by Dr. Bobbi Villaseñor with PCI of a chronically occluded RCA fed by LAD collaterals as well as a moderate-sized OM branch with mid-branch stenosis. This procedure was in the setting of chest and bilateral arm discomfort and faintly elevated cardiac markers and a nondiagnostic ECG. I think the reasonable thing to do is give her Lovenox tonight, trend her cardiac markers, and make NPO after MN in case Dr. Bobbi Villaseñor decides on a cardiac cath. -Admit to telemetry  -Lovenox dose Now  -Continue beta-blocker and Brilinta and asa  And statin and nitrates  -Trend cardiac enzymes    B/L Hand tingling  CT head negative. Consider MRI once stable    Non-insulin-dependent diabetes:  - HOLD Metformin and glipizide for now 2/2 npo  -Use sliding scale insulin in anticipation for left heart catheterization  -Check hemoglobin A1c and check Lipid profile        Hypertension  -Continue metoprolol, norvasc Imdur acei       Hypokalemia:   will be replaced -Monitor        Dyslipidemia:  -Continue statin        Tobacco abuse:  -Extensive counseling provided     Sleep Apnea on cpap at night at home cont. that        Code Status: Full code  Surrogate Decision Maker:  Bijal Paulson and daughter Artis Park     DVT Prophylaxis: Patient received Lovenox  GI Prophylaxis: not indicated     Baseline: Independent          Subjective:   CHIEF COMPLAINT:  CP    HISTORY OF PRESENT ILLNESS:     Norberto Blackman, 62 y.o. female with a past medical history significant for coronary artery disease, diabetes, hypertension presents to the ED with cc of hand tingling. She reports left hand tingling and some slight right hand tingling as well. She reports some associated \"twinges\" in her chest.  She reports chronic shortness of breath, no change in this. This all started at 3:30 PM this afternoon. She states this feels the same as when she had her prior heart attack. She denies any cough or fevers, no nausea or diaphoresis.     There are no other complaints, changes, or physical findings at this time.     PCP: Mariola Aguayo MD     No current facility-administered medications on file prior to encounter. EKG  shows normal sinus rhythm at a rate of 65. WA is 152, QRS 86, QTc of 459, axis is upright, normal R wave progression. No ST elevations or depressions suggestive of any acute cardiac ischemia. Is interpreted as normal sinus rhythm    CXR negative      We were asked to admit for work up and evaluation of the above problems. Past Medical History:   Diagnosis Date    Calculus of kidney     Difficult intubation     per patient she was given 2 papers in regards to this, asked her to bring DOS.     DM [facility]diabetes mellitus) ([facility] [address]     GERD (gastroesophageal reflux disease)     Headache     Hypertension     Sleep apnea     no cpap- severe EM per patient        Past Surgical History:   Procedure Laterality Date    HX GYN      ovary removed    HX HEENT Bilateral 2016    BMT's    HX OTHER SURGICAL  2010    KIDNEY STONES REMOVED       Social History     Tobacco Use    Smoking status: Current Every Day Smoker     Packs/day: 0.25    Smokeless tobacco: Never Used   Substance Use Topics    Alcohol use: No        No family history on file. No Known Allergies     Prior to Admission medications    Medication Sig Start Date End Date Taking? Authorizing Provider   ISOSORBIDE MONONITRATE PO Take 30 mg by mouth daily. Yes Herminia, MD Yovani   ticagrelor (BRILINTA) 90 mg tablet Take 90 mg by mouth two (2) times a day. Yes Yovani Hope MD   atorvastatin (LIPITOR) 80 mg tablet Take 80 mg by mouth nightly. Yes Yovani Hope MD   metFORMIN ER (GLUCOPHAGE XR) 750 mg tablet Take 750 mg by mouth two (2) times a day. Yes Yovani Hope MD   pantoprazole (PROTONIX) 40 mg tablet Take 40 mg by mouth daily. Yes Yovani Hope MD   amLODIPine (NORVASC) 10 mg tablet Take 10 mg by mouth daily. Yes Yovani Hope MD   aspirin delayed-release 81 mg tablet Take  by mouth daily. Yes Yovani Hope MD   lisinopriL (PRINIVIL, ZESTRIL) 40 mg tablet Take 40 mg by mouth daily. Yes Yovani Hope MD   spironolactone (ALDACTONE) 25 mg tablet Take 25 mg by mouth daily. Yes Yovani Hope MD   metoprolol succinate (TOPROL-XL) 100 mg tablet Take 100 mg by mouth two (2) times a day. Yes Yovani Hope MD   glipiZIDE (GLUCOTROL) 5 mg tablet Take 5 mg by mouth daily. Yes Provider, Historical   metoprolol tartrate (LOPRESSOR) 100 mg IR tablet Take 100 mg by mouth daily. Yovani Hope MD       REVIEW OF SYSTEMS:     I am not able to complete the review of systems because:    The patient is intubated and sedated    The patient has altered mental status due to his acute medical problems    The patient has baseline aphasia from prior stroke(s)    The patient has baseline dementia and is not reliable historian    The patient is in acute medical distress and unable to provide information         Constitutional: Negative for fever. HENT: Negative for ear pain. Eyes: Negative for pain. Respiratory: Negative for cough. Cardiovascular: Positive for chest pain. Gastrointestinal: Negative for abdominal pain. Genitourinary: Negative for dysuria. Musculoskeletal: Negative for back pain. Skin: Negative for rash. Neurological: Negative for headaches. Psychiatric/Behavioral: Negative for confusion. Objective:   VITALS:    Visit Vitals  /45   Pulse (!) 59   Temp 98.4 °F (36.9 °C)   Resp 19   Ht 5' 4\" (1.626 m)   Wt 107.7 kg (237 lb 7 oz)   SpO2 97%   BMI 40.76 kg/m²       PHYSICAL EXAM:    Constitutional:       Appearance: She is well-developed. HENT:      Head: Normocephalic and atraumatic. Mouth/Throat:      Mouth: Mucous membranes are moist.   Eyes:      Pupils: Pupils are equal, round, and reactive to light. Neck:      Musculoskeletal: Normal range of motion. Cardiovascular:      Rate and Rhythm: Normal rate and regular rhythm. Pulmonary:      Effort: Pulmonary effort is normal.   Abdominal:      Palpations: Abdomen is soft. Tenderness: There is no abdominal tenderness. Musculoskeletal: Normal range of motion. Skin:     General: Skin is warm and dry. Neurological:      General: No focal deficit present. Mental Status: She is alert.    Psychiatric:         Mood and Affect: Mood normal.       _______________________________________________________________________  Care Plan discussed with:    Comments   Patient     Family      RN     Care Manager                    Consultant:      _______________________________________________________________________  Expected  Disposition:   Home with Family    HH/PT/OT/RN    SNF/LTC    HAYDEE    ________________________________________________________________________  TOTAL TIME:    Minutes    Critical Care Provided     Minutes non procedure based      Comments     Reviewed previous records   >50% of visit spent in counseling and coordination of care  Discussion with patient and/or family and questions answered       Given the patient's current clinical presentation, I have a high level of concern for decompensation if discharged from the ED. Complex decision making was performed which includes reviewing the patient's available past medical records, laboratory results, and Xray films. I have also directly communicated my plan and discussed this case with the involved ED physician.     ____________________________________________________________________  Светлана Wallis MD    Procedures: see electronic medical records for all procedures/Xrays and details which were not copied into this note but were reviewed prior to creation of Plan. LAB DATA REVIEWED:    Recent Results (from the past 24 hour(s))   EKG, 12 LEAD, INITIAL    Collection Time: 08/13/20  6:35 PM   Result Value Ref Range    Ventricular Rate 65 BPM    Atrial Rate 65 BPM    P-R Interval 152 ms    QRS Duration 86 ms    Q-T Interval 442 ms    QTC Calculation (Bezet) 459 ms    Calculated P Axis 47 degrees    Calculated R Axis 0 degrees    Calculated T Axis 18 degrees    Diagnosis       Normal sinus rhythm  Anterior infarct (cited on or before 19-MAY-2020)  When compared with ECG of 19-MAY-2020 15:24,  Questionable change in QRS axis     CBC WITH AUTOMATED DIFF    Collection Time: 08/13/20  6:57 PM   Result Value Ref Range    WBC 7.6 3.6 - 11.0 K/uL    RBC 4.52 3.80 - 5.20 M/uL    HGB 13.2 11.5 - 16.0 g/dL    HCT 40.4 35.0 - 47.0 %    MCV 89.4 80.0 - 99.0 FL    MCH 29.2 26.0 - 34.0 PG    MCHC 32.7 30.0 - 36.5 g/dL    RDW 14.8 (H) 11.5 - 14.5 %    PLATELET 560 772 - 866 K/uL    MPV 10.6 8.9 - 12.9 FL    NRBC 0.0 0  WBC    ABSOLUTE NRBC 0.00 0.00 - 0.01 K/uL    NEUTROPHILS 49 32 - 75 %    LYMPHOCYTES 43 12 - 49 %    MONOCYTES 6 5 - 13 %    EOSINOPHILS 2 0 - 7 %    BASOPHILS 0 0 - 1 %    IMMATURE GRANULOCYTES 0 0.0 - 0.5 %    ABS. NEUTROPHILS 3.7 1.8 - 8.0 K/UL    ABS.  LYMPHOCYTES 3.2 0.8 - 3.5 K/UL    ABS. MONOCYTES 0.4 0.0 - 1.0 K/UL    ABS. EOSINOPHILS 0.2 0.0 - 0.4 K/UL    ABS. BASOPHILS 0.0 0.0 - 0.1 K/UL    ABS. IMM. GRANS. 0.0 0.00 - 0.04 K/UL    DF AUTOMATED     METABOLIC PANEL, COMPREHENSIVE    Collection Time: 08/13/20  6:57 PM   Result Value Ref Range    Sodium 139 136 - 145 mmol/L    Potassium 3.0 (L) 3.5 - 5.1 mmol/L    Chloride 106 97 - 108 mmol/L    CO2 28 21 - 32 mmol/L    Anion gap 5 5 - 15 mmol/L    Glucose 108 (H) 65 - 100 mg/dL    BUN 12 6 - 20 MG/DL    Creatinine 0.65 0.55 - 1.02 MG/DL    BUN/Creatinine ratio 18 12 - 20      GFR est AA >60 >60 ml/min/1.73m2    GFR est non-AA >60 >60 ml/min/1.73m2    Calcium 7.9 (L) 8.5 - 10.1 MG/DL    Bilirubin, total 0.4 0.2 - 1.0 MG/DL    ALT (SGPT) 30 12 - 78 U/L    AST (SGOT) 19 15 - 37 U/L    Alk.  phosphatase 61 45 - 117 U/L    Protein, total 7.3 6.4 - 8.2 g/dL    Albumin 3.3 (L) 3.5 - 5.0 g/dL    Globulin 4.0 2.0 - 4.0 g/dL    A-G Ratio 0.8 (L) 1.1 - 2.2     CK W/ REFLX CKMB    Collection Time: 08/13/20  6:57 PM   Result Value Ref Range    CK 79 26 - 192 U/L   TROPONIN I    Collection Time: 08/13/20  6:57 PM   Result Value Ref Range    Troponin-I, Qt. 0.11 (H) <0.05 ng/mL

## 2020-08-14 NOTE — ED NOTES
Report received from [address]. Advised of patient's chief complaint, orders that are completed, any outstanding orders that still need to be completed, and the current treatment plan. All questions addressed prior to assumption of patient care at this time.

## 2020-08-14 NOTE — PROGRESS NOTES
SN[facility]  Home  Follow up appointments  Called to Care Card provided to patient. Reason for Admission:   Chest pain, Cardiac cath this admission                   RUR Score:     14             PCP: First and Last name:   Lorri Griffin MD   Name of Practice:   [facility]   Are you a current patient: Yes/No:   yes   Approximate date of last visit:    Can you participate in a virtual visit if needed:   Not sure    Do you (patient/family) have any concerns for transition/discharge? No concerns identified. Medicaid insurance.  will provide transportation at time of discharge. Plan for utilizing home health:   n/a    Current Advanced Directive/Advance Care Plan:  Not on file.  is LNOK    CM met with patient,  present. Pt name and  confirmed as pt identifiers. Demographics confirmed. ADL's/IADL's - independent pta to include steps and driving  Preferred Rx - [address] [facility]  PCP Verified by CM: Yamel Samayoa MD)  Mode of Transport at Discharge: Other (see comment)(family)  Discharge Durable Medical Equipment: No(uses cpap)  Physical Therapy Consult: No  Occupational Therapy Consult: No  Speech Therapy Consult: No  Current Support Network: Lives with Spouse(1 story home, 4 steps entry. Reports being able to navigate steps safely )  Confirm Follow Up Transport: Family  Discharge Location  Discharge Placement: Home     CM will continue to follow for discharge planning.      Galina Lobato RN CM  Ext [phone]

## 2020-08-14 NOTE — PROGRESS NOTES
0215: Pt arrived to IVCU. VSS, NSR/Sinus Sarai Godfrey RA. Pt reports no SOB/CP. Pt ambulated in the room and to the bathroom to void x 0 assistance. Admission complete.     0700: Bedside and Verbal shift change report given to day shift nurse Pallavi Cohen (oncoming nurse) by Eva Samayoa RN (offgoing nurse). Report included the following information: SBAR, Kardex, Intake/Output, MAR, Procedural information, and Recent Results.

## 2020-08-14 NOTE — PROGRESS NOTES
Brief Procedure Note    Patient: Joe Vela MRN: [id number]  SSN: [id number]    YOB: 1960  Age: 62 y.o. Sex: female      Date of Procedure: 8/14/2020     Pre-procedure Diagnosis: [place]    Post-procedure Diagnosis: 1v CAD, normal LVEF    Procedure: Ultrasound-guided vascular access, LHC, cors, LVg, PTCA/PCI of RPLB w/ 1 JES    Performed By: Pat Armstrong III, DO     Anesthesia: Moderate Sedation    Estimated Blood Loss: Less than 10 mL      Specimens:  None    Findings: as above    Complications: None    Implants: 1 [place] JES    Recommendations: Continue medical therapy.     Signed By: Pat Armstrong III, DO     August 14, 2020

## 2020-08-14 NOTE — ED NOTES
Spoke with MD Jazmine Banks and made him aware of elevated troponin. No new orders obtained at this time.

## 2020-08-14 NOTE — CONSULTS
Consult    NAME: Nixon Valle   :  1960   MRN:  [id number]     Date/Time:  2020 8:48 AM    Patient PCP: Dusty Juarez MD  ________________________________________________________________________     Assessment:     1. Indeterminate troponin elevation  2. Coronary artery disease. NSTEMI 3/20 w/ cath @ [facility] EM[facility]. RCA  attempt (wire would cross only); recurrent USA/NSETMI  w/ PCI of RCA w/ 2 JES and OM1 w/  JES. 3. Hypokalemia  4. Diabetes  5. Hypertension  6. EM on CPAPP  7. GERD  8. Hyperlipidemia  9. Tobacco abuse        Plan: TnI 0.1  K 3.2    Old films reviewed    1. NPO  2. I have recommended diagnostic cath for definitive evaluation of the patient's coronary anatomy and PCI if appropriate. All risks, benefits, and alternatives were discussed and the patient wishes to proceed. 3. Replete K  4. Continue ASA  5. Continue ticagrelor  6. Continue statin  7. Continue ISMN 30mg  8. Continue aldactone 25mg  9. Continue Toprol XL 100mg  10. Continue lisinopril 40mg    Thank you for this consult and allowing me to take part in this patients care. Please call with questions. [x]        High complexity decision making was performed        Subjective:   CHIEF COMPLAINT: Hand tingling    HISTORY OF PRESENT ILLNESS:     Fernanda Gongora is a 62 y.o.  female who \"presents to the ED with cc of hand tingling.  She reports left hand tingling and some slight right hand tingling as well.  She reports some associated \"twinges\" in her chest.  She reports chronic shortness of breath, no change in this.  This all started at 3:30 PM this afternoon.  She states this feels the same as when she had her prior heart attack.  She denies any cough or fevers, no nausea or diaphoresis. \"      We were asked to consult for work up and evaluation of the above problems.      Past Medical History:   Diagnosis Date    Calculus of kidney     Difficult intubation     per patient she was given 2 papers in regards to this, asked her to bring DOS.  DM (diabetes mellitus) ([facility] [address]     GERD (gastroesophageal reflux disease)     Headache     Hypertension     NSTEMI (non-ST elevated myocardial infarction) ([facility] 5/17/2020    Sleep apnea     no cpap- severe EM per patient      Past Surgical History:   Procedure Laterality Date    HX GYN      ovary removed    HX HEENT Bilateral 2016    BMT's    HX OTHER SURGICAL  2010    KIDNEY STONES REMOVED     No Known Allergies   Meds:  See below  Social History     Tobacco Use    Smoking status: Current Every Day Smoker     Packs/day: 0.25    Smokeless tobacco: Never Used   Substance Use Topics    Alcohol use: No      No family history on file. REVIEW OF SYSTEMS:     []         Unable to obtain  ROS due to ---   [x]         Total of 12 systems reviewed as follows:    Constitutional: negative fever, negative chills, negative weight loss  Eyes:   negative visual changes  ENT:   negative sore throat, tongue or lip swelling  Respiratory:  negative cough, negative dyspnea  Cards:  negative for chest pain, palpitations, lower extremity edema  GI:   negative for nausea, vomiting, diarrhea, and abdominal pain  Genitourinary: negative for frequency, dysuria  Integument:  negative for rash   Hematologic:  negative for easy bruising and gum/nose bleeding  Musculoskel: negative for myalgias,  back pain  Neurological:  negative for headaches, dizziness, vertigo, weakness  Behavl/Psych: negative for feelings of anxiety, depression     Pertinent Positives include :    Objective:      Physical Exam:    Last 24hrs VS reviewed since prior progress note.  Most recent are:    Visit Vitals  /70   Pulse 62   Temp 98.1 °F (36.7 °C)   Resp 19   Ht 5' 4\" (1.626 m)   Wt 107.4 kg (236 lb 12.4 oz)   LMP  (LMP Unknown)   SpO2 99%   BMI 40.64 kg/m²     No intake or output data in the 24 hours ending 08/14/20 0848     General Appearance: Well developed, well nourished, alert & oriented x 3,    no acute distress. Ears/Nose/Mouth/Throat: Pupils equal and round, Hearing grossly normal.  Neck: Supple. JVP within normal limits. Carotids good upstrokes, with no bruit. Chest: Lungs clear to auscultation bilaterally. Cardiovascular: Regular rate and rhythm, S1S2 normal, no murmur, rubs, gallops. Abdomen: Soft, non-tender, bowel sounds are active. No organomegaly. Extremities: No edema bilaterally. Femoral pulses +2, Distal Pulses +1. Skin: Warm and dry. Neuro: CN II-XII grossly intact, Strength and sensation grossly intact. []         Post-cath site without hematoma, bruit, tenderness, or thrill. Distal pulses intact. Data:      Telemetry:  SR    EKG:  SR  []  No new EKG for review. Prior to Admission medications    Medication Sig Start Date End Date Taking? Authorizing Provider   ISOSORBIDE MONONITRATE PO Take 30 mg by mouth daily. Yes Herminia, MD Yovani   ticagrelor (BRILINTA) 90 mg tablet Take 90 mg by mouth two (2) times a day. Yes Herminia, MD Yovani   metoprolol tartrate (LOPRESSOR) 100 mg IR tablet Take 100 mg by mouth daily. Yes Yovani Hope MD   atorvastatin (LIPITOR) 80 mg tablet Take 80 mg by mouth nightly. Yes Yovani Hope MD   metFORMIN ER (GLUCOPHAGE XR) 750 mg tablet Take 750 mg by mouth two (2) times a day. Yes Herminia, MD Yovani   pantoprazole (PROTONIX) 40 mg tablet Take 40 mg by mouth daily. Yes Yovani Hope MD   amLODIPine (NORVASC) 10 mg tablet Take 10 mg by mouth daily. Yes Herminia, MD Yovani   aspirin delayed-release 81 mg tablet Take  by mouth daily. Yes Herminia, MD Yovani   lisinopriL (PRINIVIL, ZESTRIL) 40 mg tablet Take 40 mg by mouth daily. Yes Yovani Hope MD   spironolactone (ALDACTONE) 25 mg tablet Take 25 mg by mouth daily. Yes Yovani Hope MD   glipiZIDE (GLUCOTROL) 5 mg tablet Take 5 mg by mouth daily. Yes Provider, Historical   metoprolol succinate (TOPROL-XL) 100 mg tablet Take 100 mg by mouth two (2) times a day.     Other, MD Yovani       Recent Results (from the past 24 hour(s))   EKG, 12 LEAD, INITIAL    Collection Time: 08/13/20  6:35 PM   Result Value Ref Range    Ventricular Rate 65 BPM    Atrial Rate 65 BPM    P-R Interval 152 ms    QRS Duration 86 ms    Q-T Interval 442 ms    QTC Calculation (Bezet) 459 ms    Calculated P Axis 47 degrees    Calculated R Axis 0 degrees    Calculated T Axis 18 degrees    Diagnosis       Normal sinus rhythm  Nonspecific ST-T wave changes  Confirmed by Olivia Fernando ([id number]) on 8/14/2020 8:24:57 AM     CBC WITH AUTOMATED DIFF    Collection Time: 08/13/20  6:57 PM   Result Value Ref Range    WBC 7.6 3.6 - 11.0 K/uL    RBC 4.52 3.80 - 5.20 M/uL    HGB 13.2 11.5 - 16.0 g/dL    HCT 40.4 35.0 - 47.0 %    MCV 89.4 80.0 - 99.0 FL    MCH 29.2 26.0 - 34.0 PG    MCHC 32.7 30.0 - 36.5 g/dL    RDW 14.8 (H) 11.5 - 14.5 %    PLATELET 240 555 - 543 K/uL    MPV 10.6 8.9 - 12.9 FL    NRBC 0.0 0  WBC    ABSOLUTE NRBC 0.00 0.00 - 0.01 K/uL    NEUTROPHILS 49 32 - 75 %    LYMPHOCYTES 43 12 - 49 %    MONOCYTES 6 5 - 13 %    EOSINOPHILS 2 0 - 7 %    BASOPHILS 0 0 - 1 %    IMMATURE GRANULOCYTES 0 0.0 - 0.5 %    ABS. NEUTROPHILS 3.7 1.8 - 8.0 K/UL    ABS. LYMPHOCYTES 3.2 0.8 - 3.5 K/UL    ABS. MONOCYTES 0.4 0.0 - 1.0 K/UL    ABS. EOSINOPHILS 0.2 0.0 - 0.4 K/UL    ABS. BASOPHILS 0.0 0.0 - 0.1 K/UL    ABS. IMM. GRANS. 0.0 0.00 - 0.04 K/UL    DF AUTOMATED     METABOLIC PANEL, COMPREHENSIVE    Collection Time: 08/13/20  6:57 PM   Result Value Ref Range    Sodium 139 136 - 145 mmol/L    Potassium 3.0 (L) 3.5 - 5.1 mmol/L    Chloride 106 97 - 108 mmol/L    CO2 28 21 - 32 mmol/L    Anion gap 5 5 - 15 mmol/L    Glucose 108 (H) 65 - 100 mg/dL    BUN 12 6 - 20 MG/DL    Creatinine 0.65 0.55 - 1.02 MG/DL    BUN/Creatinine ratio 18 12 - 20      GFR est AA >60 >60 ml/min/1.73m2    GFR est non-AA >60 >60 ml/min/1.73m2    Calcium 7.9 (L) 8.5 - 10.1 MG/DL    Bilirubin, total 0.4 0.2 - 1.0 MG/DL    ALT (SGPT) 30 12 - 78 U/L    AST (SGOT) 19 15 - 37 U/L    Alk. phosphatase 61 45 - 117 U/L    Protein, total 7.3 6.4 - 8.2 g/dL    Albumin 3.3 (L) 3.5 - 5.0 g/dL    Globulin 4.0 2.0 - 4.0 g/dL    A-G Ratio 0.8 (L) 1.1 - 2.2     CK W/ REFLX CKMB    Collection Time: 08/13/20  6:57 PM   Result Value Ref Range    CK 79 26 - 192 U/L   TROPONIN I    Collection Time: 08/13/20  6:57 PM   Result Value Ref Range    Troponin-I, Qt. 0.11 (H) <0.05 ng/mL   HEMOGLOBIN A1C WITH EAG    Collection Time: 08/13/20  6:57 PM   Result Value Ref Range    Hemoglobin A1c 6.9 (H) 4.0 - 5.6 %    Est. average glucose 151 mg/dL   TROPONIN I    Collection Time: 08/14/20  1:00 AM   Result Value Ref Range    Troponin-I, Qt. 0.11 (H) <0.11 ng/mL   METABOLIC PANEL, COMPREHENSIVE    Collection Time: 08/14/20  6:26 AM   Result Value Ref Range    Sodium 141 136 - 145 mmol/L    Potassium 3.2 (L) 3.5 - 5.1 mmol/L    Chloride 108 97 - 108 mmol/L    CO2 27 21 - 32 mmol/L    Anion gap 6 5 - 15 mmol/L    Glucose 125 (H) 65 - 100 mg/dL    BUN 10 6 - 20 MG/DL    Creatinine 0.66 0.55 - 1.02 MG/DL    BUN/Creatinine ratio 15 12 - 20      GFR est AA >60 >60 ml/min/1.73m2    GFR est non-AA >60 >60 ml/min/1.73m2    Calcium 8.1 (L) 8.5 - 10.1 MG/DL    Bilirubin, total 0.5 0.2 - 1.0 MG/DL    ALT (SGPT) 26 12 - 78 U/L    AST (SGOT) 16 15 - 37 U/L    Alk.  phosphatase 49 45 - 117 U/L    Protein, total 7.0 6.4 - 8.2 g/dL    Albumin 3.1 (L) 3.5 - 5.0 g/dL    Globulin 3.9 2.0 - 4.0 g/dL    A-G Ratio 0.8 (L) 1.1 - 2.2     CBC W/O DIFF    Collection Time: 08/14/20  6:26 AM   Result Value Ref Range    WBC 8.2 3.6 - 11.0 K/uL    RBC 4.42 3.80 - 5.20 M/uL    HGB 12.9 11.5 - 16.0 g/dL    HCT 39.8 35.0 - 47.0 %    MCV 90.0 80.0 - 99.0 FL    MCH 29.2 26.0 - 34.0 PG    MCHC 32.4 30.0 - 36.5 g/dL    RDW 14.7 (H) 11.5 - 14.5 %    PLATELET 429 550 - 759 K/uL    MPV 11.0 8.9 - 12.9 FL    NRBC 0.0 0  WBC    ABSOLUTE NRBC 0.00 0.00 - 0.01 K/uL   TROPONIN I    Collection Time: 08/14/20  6:26 AM   Result Value Ref Range    Troponin-I, Qt. 0.13 (H) <0.05 ng/mL        Jagdeep Waldron III, DO

## 2020-08-14 NOTE — ED PROVIDER NOTES
EMERGENCY DEPARTMENT HISTORY AND PHYSICAL EXAM      Date: 8/13/2020  Patient Name: Rebel Calhoun    History of Presenting Illness     Chief Complaint   Patient presents with    Numbness     pt states she has numbing in both hands, radiates up arms, pt states this is same symptom she had with previous MI back in May    Shortness of Breath     chronic, since first MI, worse this morning       History Provided By: Patient    HPI: Rebel Calhoun, 62 y.o. female with a past medical history significant for coronary artery disease, diabetes, hypertension presents to the ED with cc of hand tingling. She reports left hand tingling and some slight right hand tingling as well. She reports some associated \"twinges\" in her chest.  She reports chronic shortness of breath, no change in this. This all started at 3:30 PM this afternoon. She states this feels the same as when she had her prior heart attack. She denies any cough or fevers, no nausea or diaphoresis. There are no other complaints, changes, or physical findings at this time. PCP: Alicia Coats MD    No current facility-administered medications on file prior to encounter. Current Outpatient Medications on File Prior to Encounter   Medication Sig Dispense Refill    ISOSORBIDE MONONITRATE PO Take 30 mg by mouth daily.  ticagrelor (BRILINTA) 90 mg tablet Take 90 mg by mouth two (2) times a day.  atorvastatin (LIPITOR) 80 mg tablet Take 80 mg by mouth nightly.  metFORMIN ER (GLUCOPHAGE XR) 750 mg tablet Take 750 mg by mouth two (2) times a day.  pantoprazole (PROTONIX) 40 mg tablet Take 40 mg by mouth daily.  amLODIPine (NORVASC) 10 mg tablet Take 10 mg by mouth daily.  aspirin delayed-release 81 mg tablet Take  by mouth daily.  lisinopriL (PRINIVIL, ZESTRIL) 40 mg tablet Take 40 mg by mouth daily.  spironolactone (ALDACTONE) 25 mg tablet Take 25 mg by mouth daily.       metoprolol succinate (TOPROL-XL) 100 mg tablet Take 100 mg by mouth two (2) times a day.  glipiZIDE (GLUCOTROL) 5 mg tablet Take 5 mg by mouth daily.  metoprolol tartrate (LOPRESSOR) 100 mg IR tablet Take 100 mg by mouth daily. Past History     Past Medical History:  Past Medical History:   Diagnosis Date    Calculus of kidney     Difficult intubation     per patient she was given 2 papers in regards to this, asked her to bring DOS.  DM (diabetes mellitus) ([address]     GERD (gastroesophageal reflux disease)     Headache     Hypertension     Sleep apnea     no cpap- severe EM per patient       Past Surgical History:  Past Surgical History:   Procedure Laterality Date    HX GYN      ovary removed    HX HEENT Bilateral 2016    BMT's    HX OTHER SURGICAL  2010    KIDNEY STONES REMOVED       Family History:  No family history on file. Social History:  Social History     Tobacco Use    Smoking status: Current Every Day Smoker     Packs/day: 0.25    Smokeless tobacco: Never Used   Substance Use Topics    Alcohol use: No    Drug use: No       Allergies:  No Known Allergies      Review of Systems   Review of Systems   Constitutional: Negative for fever. HENT: Negative for ear pain. Eyes: Negative for pain. Respiratory: Negative for cough. Cardiovascular: Positive for chest pain. Gastrointestinal: Negative for abdominal pain. Genitourinary: Negative for dysuria. Musculoskeletal: Negative for back pain. Skin: Negative for rash. Neurological: Negative for headaches. Psychiatric/Behavioral: Negative for confusion. Physical Exam   Physical Exam  Constitutional:       Appearance: She is well-developed. HENT:      Head: Normocephalic and atraumatic. Mouth/Throat:      Mouth: Mucous membranes are moist.   Eyes:      Pupils: Pupils are equal, round, and reactive to light. Neck:      Musculoskeletal: Normal range of motion. Cardiovascular:      Rate and Rhythm: Normal rate and regular rhythm. Pulmonary:      Effort: Pulmonary effort is normal.   Abdominal:      Palpations: Abdomen is soft. Tenderness: There is no abdominal tenderness. Musculoskeletal: Normal range of motion. Skin:     General: Skin is warm and dry. Neurological:      General: No focal deficit present. Mental Status: She is alert. Psychiatric:         Mood and Affect: Mood normal.         Diagnostic Study Results     Labs -     Recent Results (from the past 24 hour(s))   EKG, 12 LEAD, INITIAL    Collection Time: 08/13/20  6:35 PM   Result Value Ref Range    Ventricular Rate 65 BPM    Atrial Rate 65 BPM    P-R Interval 152 ms    QRS Duration 86 ms    Q-T Interval 442 ms    QTC Calculation (Bezet) 459 ms    Calculated P Axis 47 degrees    Calculated R Axis 0 degrees    Calculated T Axis 18 degrees    Diagnosis       Normal sinus rhythm  Anterior infarct (cited on or before 19-MAY-2020)  When compared with ECG of 19-MAY-2020 15:24,  Questionable change in QRS axis     CBC WITH AUTOMATED DIFF    Collection Time: 08/13/20  6:57 PM   Result Value Ref Range    WBC 7.6 3.6 - 11.0 K/uL    RBC 4.52 3.80 - 5.20 M/uL    HGB 13.2 11.5 - 16.0 g/dL    HCT 40.4 35.0 - 47.0 %    MCV 89.4 80.0 - 99.0 FL    MCH 29.2 26.0 - 34.0 PG    MCHC 32.7 30.0 - 36.5 g/dL    RDW 14.8 (H) 11.5 - 14.5 %    PLATELET 376 400 - 371 K/uL    MPV 10.6 8.9 - 12.9 FL    NRBC 0.0 0  WBC    ABSOLUTE NRBC 0.00 0.00 - 0.01 K/uL    NEUTROPHILS 49 32 - 75 %    LYMPHOCYTES 43 12 - 49 %    MONOCYTES 6 5 - 13 %    EOSINOPHILS 2 0 - 7 %    BASOPHILS 0 0 - 1 %    IMMATURE GRANULOCYTES 0 0.0 - 0.5 %    ABS. NEUTROPHILS 3.7 1.8 - 8.0 K/UL    ABS. LYMPHOCYTES 3.2 0.8 - 3.5 K/UL    ABS. MONOCYTES 0.4 0.0 - 1.0 K/UL    ABS. EOSINOPHILS 0.2 0.0 - 0.4 K/UL    ABS. BASOPHILS 0.0 0.0 - 0.1 K/UL    ABS. IMM.  GRANS. 0.0 0.00 - 0.04 K/UL    DF AUTOMATED     METABOLIC PANEL, COMPREHENSIVE    Collection Time: 08/13/20  6:57 PM   Result Value Ref Range    Sodium 139 136 - 145 mmol/L    Potassium 3.0 (L) 3.5 - 5.1 mmol/L    Chloride 106 97 - 108 mmol/L    CO2 28 21 - 32 mmol/L    Anion gap 5 5 - 15 mmol/L    Glucose 108 (H) 65 - 100 mg/dL    BUN 12 6 - 20 MG/DL    Creatinine 0.65 0.55 - 1.02 MG/DL    BUN/Creatinine ratio 18 12 - 20      GFR est AA >60 >60 ml/min/1.73m2    GFR est non-AA >60 >60 ml/min/1.73m2    Calcium 7.9 (L) 8.5 - 10.1 MG/DL    Bilirubin, total 0.4 0.2 - 1.0 MG/DL    ALT (SGPT) 30 12 - 78 U/L    AST (SGOT) 19 15 - 37 U/L    Alk. phosphatase 61 45 - 117 U/L    Protein, total 7.3 6.4 - 8.2 g/dL    Albumin 3.3 (L) 3.5 - 5.0 g/dL    Globulin 4.0 2.0 - 4.0 g/dL    A-G Ratio 0.8 (L) 1.1 - 2.2     CK W/ REFLX CKMB    Collection Time: 08/13/20  6:57 PM   Result Value Ref Range    CK 79 26 - 192 U/L   TROPONIN I    Collection Time: 08/13/20  6:57 PM   Result Value Ref Range    Troponin-I, Qt. 0.11 (H) <0.05 ng/mL       Radiologic Studies -   XR CHEST PORT   Final Result   IMPRESSION: No acute findings. CT Results  (Last 48 hours)    None        CXR Results  (Last 48 hours)               08/13/20 1915  XR CHEST PORT Final result    Impression:  IMPRESSION: No acute findings. Narrative:  EXAM: XR CHEST PORT       INDICATION: chest pain       COMPARISON: 5/17/2020       FINDINGS: A portable AP radiograph of the chest was obtained at 1900 hours. There is no pneumothorax or pleural effusion. The lungs are clear. Cardiac,   mediastinal and hilar contours appear normal, unchanged. The bones and soft   tissues are grossly within normal limits. Medical Decision Making   I am the first provider for this patient. I reviewed the vital signs, available nursing notes, past medical history, past surgical history, family history and social history. Vital Signs-Reviewed the patient's vital signs.   Patient Vitals for the past 12 hrs:   Temp Pulse Resp BP SpO2   08/13/20 1945 98.4 °F (36.9 °C) 63 18 129/63 97 %   08/13/20 1834 98.4 °F (36.9 °C) 73 18 142/65 98 %       Records Reviewed: Nursing records and medical records reviewed    MDM:  64-year-old female presenting with tingling in her left hand as well as some slight chest discomfort. .  She does have a very significant cardiac history, I have concern for this especially as she states that this feels similar to her prior cardiac ischemia. She does not have any fevers or cough, low concern for any pneumonia, no overt signs of any CHF or any other cardiopulmonary emergency. Blood work is obtained. Her troponin is elevated, however does have a chronically elevated troponin per chart review. EKG was performed at 18: 35, shows normal sinus rhythm at a rate of 65. OK is 152, QRS 86, QTc of 459, axis is upright, normal R wave progression. No ST elevations or depressions suggestive of any acute cardiac ischemia. Is interpreted as normal sinus rhythm    Provider Notes (Medical Decision Making): I spoken with cardiologist on call, they agree with admission especially given her significant past medical history, patient and  are in agreement with the plan. ED Course:   Initial assessment performed. The patients presenting problems have been discussed, and they are in agreement with the care plan formulated and outlined with them. I have encouraged them to ask questions as they arise throughout their visit. Critical Care:  Delete      Disposition:  Admit to medicine    DISCHARGE PLAN:  1. Current Discharge Medication List        2. Follow-up Information    None       3. Return to ED if worse     Diagnosis     Clinical Impression: Acute chest discomfort and hand tingling, history of coronary artery disease  Attestations:    Diamond Stallworth MD    Please note that this dictation was completed with exsulin, the computer voice recognition software.   Quite often unanticipated grammatical, syntax, homophones, and other interpretive errors are inadvertently transcribed by the computer software. Please disregard these errors. Please excuse any errors that have escaped final proofreading. Thank you.

## 2020-08-15 ENCOUNTER — HOSPITAL ENCOUNTER (EMERGENCY)
Age: 60
Discharge: HOME | End: 2020-08-16
Attending: EMERGENCY MEDICINE
Payer: MEDICAID

## 2020-08-15 ENCOUNTER — APPOINTMENT (OUTPATIENT)
Dept: ULTRASOUND IMAGING | Age: 60
End: 2020-08-15
Attending: EMERGENCY MEDICINE
Payer: MEDICAID

## 2020-08-15 ENCOUNTER — APPOINTMENT (OUTPATIENT)
Dept: VASCULAR SURGERY | Age: 60
End: 2020-08-15
Attending: EMERGENCY MEDICINE
Payer: MEDICAID

## 2020-08-15 VITALS
WEIGHT: 236.77 LBS | HEIGHT: 64 IN | SYSTOLIC BLOOD PRESSURE: 109 MMHG | BODY MASS INDEX: 40.42 KG/M2 | TEMPERATURE: 97.9 F | RESPIRATION RATE: 17 BRPM | OXYGEN SATURATION: 96 % | DIASTOLIC BLOOD PRESSURE: 71 MMHG | HEART RATE: 59 BPM

## 2020-08-15 DIAGNOSIS — M25.562 ARTHRALGIA OF LEFT LOWER LEG: Primary | ICD-10-CM

## 2020-08-15 LAB
ALBUMIN SERPL-MCNC: 3.3 G/DL (ref 3.5–5)
ALBUMIN/GLOB SERPL: 0.8 {RATIO} (ref 1.1–2.2)
ALP SERPL-CCNC: 60 U/L (ref 45–117)
ALT SERPL-CCNC: 29 U/L (ref 12–78)
ANION GAP SERPL CALC-SCNC: 5 MMOL/L (ref 5–15)
AST SERPL-CCNC: 20 U/L (ref 15–37)
ATRIAL RATE: 51 BPM
BASOPHILS # BLD: 0 K/UL (ref 0–0.1)
BASOPHILS NFR BLD: 0 % (ref 0–1)
BILIRUB SERPL-MCNC: 0.3 MG/DL (ref 0.2–1)
BUN SERPL-MCNC: 14 MG/DL (ref 6–20)
BUN/CREAT SERPL: 19 (ref 12–20)
CALCIUM SERPL-MCNC: 8.3 MG/DL (ref 8.5–10.1)
CALCULATED P AXIS, ECG09: 54 DEGREES
CALCULATED R AXIS, ECG10: -4 DEGREES
CALCULATED T AXIS, ECG11: -50 DEGREES
CHLORIDE SERPL-SCNC: 106 MMOL/L (ref 97–108)
CK SERPL-CCNC: 75 U/L (ref 26–192)
CO2 SERPL-SCNC: 29 MMOL/L (ref 21–32)
CREAT SERPL-MCNC: 0.73 MG/DL (ref 0.55–1.02)
DIAGNOSIS, 93000: NORMAL
DIFFERENTIAL METHOD BLD: ABNORMAL
EOSINOPHIL # BLD: 0.1 K/UL (ref 0–0.4)
EOSINOPHIL NFR BLD: 1 % (ref 0–7)
ERYTHROCYTE [DISTWIDTH] IN BLOOD BY AUTOMATED COUNT: 14.6 % (ref 11.5–14.5)
GLOBULIN SER CALC-MCNC: 4.1 G/DL (ref 2–4)
GLUCOSE SERPL-MCNC: 203 MG/DL (ref 65–100)
HCT VFR BLD AUTO: 38 % (ref 35–47)
HGB BLD-MCNC: 12.4 G/DL (ref 11.5–16)
IMM GRANULOCYTES # BLD AUTO: 0 K/UL (ref 0–0.04)
IMM GRANULOCYTES NFR BLD AUTO: 1 % (ref 0–0.5)
INR PPP: 1 (ref 0.9–1.1)
LYMPHOCYTES # BLD: 2.3 K/UL (ref 0.8–3.5)
LYMPHOCYTES NFR BLD: 26 % (ref 12–49)
MCH RBC QN AUTO: 29.2 PG (ref 26–34)
MCHC RBC AUTO-ENTMCNC: 32.6 G/DL (ref 30–36.5)
MCV RBC AUTO: 89.6 FL (ref 80–99)
MONOCYTES # BLD: 0.5 K/UL (ref 0–1)
MONOCYTES NFR BLD: 6 % (ref 5–13)
NEUTS SEG # BLD: 5.7 K/UL (ref 1.8–8)
NEUTS SEG NFR BLD: 66 % (ref 32–75)
NRBC # BLD: 0 K/UL (ref 0–0.01)
NRBC BLD-RTO: 0 PER 100 WBC
P-R INTERVAL, ECG05: 162 MS
PLATELET # BLD AUTO: 250 K/UL (ref 150–400)
PMV BLD AUTO: 11.3 FL (ref 8.9–12.9)
POTASSIUM SERPL-SCNC: 3.1 MMOL/L (ref 3.5–5.1)
PROT SERPL-MCNC: 7.4 G/DL (ref 6.4–8.2)
PROTHROMBIN TIME: 10.8 SEC (ref 9–11.1)
Q-T INTERVAL, ECG07: 504 MS
QRS DURATION, ECG06: 90 MS
QTC CALCULATION (BEZET), ECG08: 464 MS
RBC # BLD AUTO: 4.24 M/UL (ref 3.8–5.2)
SODIUM SERPL-SCNC: 140 MMOL/L (ref 136–145)
VENTRICULAR RATE, ECG03: 51 BPM
WBC # BLD AUTO: 8.7 K/UL (ref 3.6–11)

## 2020-08-15 PROCEDURE — 94760 N-INVAS EAR/PLS OXIMETRY 1: CPT

## 2020-08-15 PROCEDURE — 93005 ELECTROCARDIOGRAM TRACING: CPT

## 2020-08-15 PROCEDURE — 74011250636 HC RX REV CODE- 250/636: Performed by: EMERGENCY MEDICINE

## 2020-08-15 PROCEDURE — 36415 COLL VENOUS BLD VENIPUNCTURE: CPT

## 2020-08-15 PROCEDURE — 74011250636 HC RX REV CODE- 250/636: Performed by: INTERNAL MEDICINE

## 2020-08-15 PROCEDURE — 93971 EXTREMITY STUDY: CPT

## 2020-08-15 PROCEDURE — 85610 PROTHROMBIN TIME: CPT

## 2020-08-15 PROCEDURE — 96374 THER/PROPH/DIAG INJ IV PUSH: CPT

## 2020-08-15 PROCEDURE — 77010033678 HC OXYGEN DAILY

## 2020-08-15 PROCEDURE — 93922 UPR/L XTREMITY ART 2 LEVELS: CPT

## 2020-08-15 PROCEDURE — 99285 EMERGENCY DEPT VISIT HI MDM: CPT

## 2020-08-15 PROCEDURE — 74011250637 HC RX REV CODE- 250/637: Performed by: EMERGENCY MEDICINE

## 2020-08-15 PROCEDURE — 82550 ASSAY OF CK (CPK): CPT

## 2020-08-15 PROCEDURE — 85025 COMPLETE CBC W/AUTO DIFF WBC: CPT

## 2020-08-15 PROCEDURE — 74011250637 HC RX REV CODE- 250/637: Performed by: HOSPITALIST

## 2020-08-15 PROCEDURE — 80053 COMPREHEN METABOLIC PANEL: CPT

## 2020-08-15 PROCEDURE — 96375 TX/PRO/DX INJ NEW DRUG ADDON: CPT

## 2020-08-15 RX ORDER — CYCLOBENZAPRINE HCL 10 MG
5 TABLET ORAL
Qty: 12 TAB | Refills: 0 | Status: SHIPPED | OUTPATIENT
Start: 2020-08-15 | End: 2022-02-23

## 2020-08-15 RX ORDER — OXYCODONE HYDROCHLORIDE 5 MG/1
5 TABLET ORAL
Qty: 12 TAB | Refills: 0 | Status: SHIPPED | OUTPATIENT
Start: 2020-08-15 | End: 2020-08-18

## 2020-08-15 RX ORDER — POTASSIUM CHLORIDE 20 MEQ/1
20 TABLET, EXTENDED RELEASE ORAL
Status: COMPLETED | OUTPATIENT
Start: 2020-08-15 | End: 2020-08-15

## 2020-08-15 RX ORDER — MORPHINE SULFATE 4 MG/ML
4 INJECTION INTRAVENOUS
Status: DISCONTINUED | OUTPATIENT
Start: 2020-08-15 | End: 2020-08-16 | Stop reason: HOSPADM

## 2020-08-15 RX ORDER — KETOROLAC TROMETHAMINE 30 MG/ML
15 INJECTION, SOLUTION INTRAMUSCULAR; INTRAVENOUS
Status: COMPLETED | OUTPATIENT
Start: 2020-08-15 | End: 2020-08-15

## 2020-08-15 RX ORDER — OXYCODONE AND ACETAMINOPHEN 5; 325 MG/1; MG/1
1 TABLET ORAL
Status: COMPLETED | OUTPATIENT
Start: 2020-08-15 | End: 2020-08-15

## 2020-08-15 RX ADMIN — FENTANYL CITRATE 25 MCG: 50 INJECTION, SOLUTION INTRAMUSCULAR; INTRAVENOUS at 01:12

## 2020-08-15 RX ADMIN — PANTOPRAZOLE SODIUM 40 MG: 40 TABLET, DELAYED RELEASE ORAL at 08:56

## 2020-08-15 RX ADMIN — POTASSIUM CHLORIDE 20 MEQ: 20 TABLET, EXTENDED RELEASE ORAL at 21:11

## 2020-08-15 RX ADMIN — FENTANYL CITRATE 25 MCG: 50 INJECTION, SOLUTION INTRAMUSCULAR; INTRAVENOUS at 05:19

## 2020-08-15 RX ADMIN — KETOROLAC TROMETHAMINE 15 MG: 30 INJECTION, SOLUTION INTRAMUSCULAR at 21:11

## 2020-08-15 RX ADMIN — FENTANYL CITRATE 25 MCG: 50 INJECTION, SOLUTION INTRAMUSCULAR; INTRAVENOUS at 07:57

## 2020-08-15 RX ADMIN — FENTANYL CITRATE 25 MCG: 50 INJECTION, SOLUTION INTRAMUSCULAR; INTRAVENOUS at 10:30

## 2020-08-15 RX ADMIN — ISOSORBIDE MONONITRATE 30 MG: 30 TABLET, EXTENDED RELEASE ORAL at 08:56

## 2020-08-15 RX ADMIN — AMLODIPINE BESYLATE 10 MG: 5 TABLET ORAL at 08:56

## 2020-08-15 RX ADMIN — ASPIRIN 81 MG: 81 TABLET, COATED ORAL at 08:56

## 2020-08-15 RX ADMIN — MORPHINE SULFATE 4 MG: 4 INJECTION, SOLUTION INTRAMUSCULAR; INTRAVENOUS at 19:30

## 2020-08-15 RX ADMIN — TICAGRELOR 90 MG: 90 TABLET ORAL at 08:56

## 2020-08-15 RX ADMIN — OXYCODONE HYDROCHLORIDE AND ACETAMINOPHEN 1 TABLET: 5; 325 TABLET ORAL at 22:24

## 2020-08-15 RX ADMIN — LISINOPRIL 40 MG: 20 TABLET ORAL at 08:56

## 2020-08-15 RX ADMIN — SPIRONOLACTONE 25 MG: 25 TABLET ORAL at 08:56

## 2020-08-15 RX ADMIN — Medication 10 ML: at 05:32

## 2020-08-15 RX ADMIN — METOPROLOL SUCCINATE 100 MG: 50 TABLET, EXTENDED RELEASE ORAL at 08:56

## 2020-08-15 NOTE — DISCHARGE SUMMARY
Hospitalist Discharge Summary     Patient ID:  Lucía Jasso  [id number]  70 y.o.  1960 8/13/2020    PCP on record: Harriet Hope MD    Admit date: 8/13/2020  Discharge date and time: 8/15/2020    DISCHARGE DIAGNOSIS:  Acute non-ST elevation MI coronary artery disease POA   Diabetes mellitus type 2   Essential hypertension   Hypokalemia   Dyslipidemia   Nicotine abuse disorder patient counseled  Sleep apnea patient is on CPAP at night will continue. Morbid Obesity due to excess caloric intake, Body mass index is 40.64 kg/m². CONSULTATIONS:  IP CONSULT TO CARDIOLOGY  IP CONSULT TO HOSPITALIST    Excerpted HPI from H&P of Kim Martinez MD:  Chris[age] Boeck y.o. female with a past medical history significant for coronary artery disease, diabetes, hypertension presents to the ED with cc of hand tingling.  She reports left hand tingling and some slight right hand tingling as well.  She reports some associated \"twinges\" in her chest.  She reports chronic shortness of breath, no change in this.  This all started at 3:30 PM this afternoon.  She states this feels the same as when she had her prior heart attack.  She denies any cough or fevers, no nausea or diaphoresis.     There are no other complaints, changes, or physical findings at this time.       ______________________________________________________________________  DISCHARGE SUMMARY/HOSPITAL COURSE:  for full details see H&P, daily progress notes, labs, consult notes. Acute non-ST elevation MI coronary artery disease POA   S/p: LHC   Findings/PostOp Diagnosis:   1. Single vessel CAD  2. Elevated LVEDP  3. Normal LVEF  4. Patent RCA stents  5. Patents LCx stents  6.  Successful PTCA and PCI of the RPLB w/ 1 JES    Diabetes mellitus type 2 , resume metformin and glipizide HbA1c is 6.9  Caloric watching and weight loss discussed with patient     Essential hypertension :  C/w Metoprolol, IMDUR,AMLODIPINE, LISINOPRIL AND ALDACTONE     Hypokalemia replaced.     Dyslipidemia we will continue statin. LDL is 46     Nicotine abuse disorder patient counseled     Sleep apnea patient is on CPAP at night will continue.        / Body mass index is 40.64 kg/m².      _______________________________________________________________________  Patient seen and examined by me on discharge day. Pertinent Findings:  Gen:    Not in distress  Chest: Clear lungs  CVS:   Regular rhythm. No edema  Abd:  Soft, not distended, not tender  Neuro:  Alert, oriented x4  _______________________________________________________________________  DISCHARGE MEDICATIONS:   Current Discharge Medication List      CONTINUE these medications which have NOT CHANGED    Details   ISOSORBIDE MONONITRATE PO Take 30 mg by mouth daily. ticagrelor (BRILINTA) 90 mg tablet Take 90 mg by mouth two (2) times a day. metoprolol tartrate (LOPRESSOR) 100 mg IR tablet Take 100 mg by mouth daily. atorvastatin (LIPITOR) 80 mg tablet Take 80 mg by mouth nightly. metFORMIN ER (GLUCOPHAGE XR) 750 mg tablet Take 750 mg by mouth two (2) times a day. pantoprazole (PROTONIX) 40 mg tablet Take 40 mg by mouth daily. amLODIPine (NORVASC) 10 mg tablet Take 10 mg by mouth daily. aspirin delayed-release 81 mg tablet Take  by mouth daily. lisinopriL (PRINIVIL, ZESTRIL) 40 mg tablet Take 40 mg by mouth daily. spironolactone (ALDACTONE) 25 mg tablet Take 25 mg by mouth daily. glipiZIDE (GLUCOTROL) 5 mg tablet Take 5 mg by mouth daily. metoprolol succinate (TOPROL-XL) 100 mg tablet Take 100 mg by mouth two (2) times a day. Patient Follow Up Instructions:    Activity: Activity as tolerated  Diet: Diabetic Diet  Wound Care: None needed    Follow-up with PCP and Cardiology in 1-2 weeks    Follow-up tests/labs none  Follow-up Information     Follow up With Specialties Details Why Contact Info    Juan Jose Campos, DO Cardiology Schedule an appointment as soon as possible for a visit in 2 weeks  [address]  [address]      Jarod Denny MD Internal Medicine In 1 week  [address]  [facility]  [phone]      Justyna Ramsey DO Cardiology In 2 weeks  [address]  [address]  [address] [address] [phone]          ________________________________________________________________    Risk of deterioration: High    Condition at Discharge:  Stable  __________________________________________________________________    Disposition  Home with family, no needs    ____________________________________________________________________    Code Status: Full Code  ___________________________________________________________________      Total time in minutes spent coordinating this discharge (includes going over instructions, follow-up, prescriptions, and preparing report for sign off to her PCP) :  40 minutes    Signed:  Ugo Dickinson MD

## 2020-08-15 NOTE — ED NOTES
Bedside and Verbal shift change report given to Alexus Aguila RN (oncoming nurse) by Ishaan Harding RN (offgoing nurse). Report included the following information SBAR, Kardex and ED Summary.

## 2020-08-15 NOTE — PROGRESS NOTES
2330 - A quarter size hematoma to right groin cath site, RN apply hand held pressure. 2338 - no bleeding and hematoma post 8 minutes hand held pressure.

## 2020-08-15 NOTE — PROGRESS NOTES
SNOW:  1) Home with FU appts  2)  transport at d/c    8:38 AM: Discharge order noted. No further needs identified and pt cleared to discharge from a care management standpoint. Care Management Interventions  PCP Verified by CM: Yes  Mode of Transport at Discharge: ()  Transition of Care Consult (CM Consult): Other(Home with FU OP appts)  Discharge Durable Medical Equipment: No  Health Maintenance Reviewed: Yes  Physical Therapy Consult: No  Occupational Therapy Consult: No  Speech Therapy Consult: No  Current Support Network: Lives with Spouse(1 story home, 4 steps entry.  Reports being able to navigate steps safely )  Confirm Follow Up Transport: Family  Discharge Location  Discharge Placement: Home with outpatient services    Lamine Gibbs RN ,[facility]   [phone]

## 2020-08-15 NOTE — PROGRESS NOTES
Bedside shift change report given to Mandy Denny RN (oncoming nurse) by Kasia Reza RN (offgoing nurse). Report included the following information SBAR, Kardex, Procedure Summary, Intake/Output, MAR, Accordion, Recent Results, Med Rec Status, Cardiac Rhythm NSR, Sinus Louvenia Nip, Alarm Parameters , Pre Procedure Checklist, Procedure Verification, Quality Measures and Dual Neuro Assessment.

## 2020-08-15 NOTE — PROGRESS NOTES
Bedside shift change report given to Milla Ruiz RN (oncoming nurse) by Andrew Barnett RN (offgoing nurse). Report included the following information SBAR, Kardex, Procedure Summary, Intake/Output, MAR, Accordion, Recent Results, Med Rec Status, Cardiac Rhythm NSR, Sinus Tiny Letters, Alarm Parameters , Pre Procedure Checklist, Procedure Verification, Quality Measures and Dual Neuro Assessment.

## 2020-08-15 NOTE — ED NOTES
Patient states yesterday she had a cardiac cath performed by MD Melyssa Vargas, with one stent placed. Patient states yesterday post cath, there were complications with her site of entry where the procedure was performed in R groin area. Pt states there was troubles with bleeding and or clotting in area of cardiac cath entry. Pt states she went home today, before leaving hospital she began to have L below the knee pain and weakness. Pt states when she arrived home she was completely unable to move the L lower leg on her own. Pt states prior to procedure she also was having R shoulder pain at baseline, however, since arriving home her R shoulder has been hurting her more than normal. Pt denies any numbness or tingling in leg or arm. Pt denies cp, sob, cough, or fevers.

## 2020-08-15 NOTE — PROGRESS NOTES
Cardiology Progress Note      8/15/2020 9:28 AM    Admit Date: 8/13/2020          Subjective: Stable overnight s c/o          Visit Vitals  /71   Pulse (!) 59   Temp 97.9 °F (36.6 °C)   Resp 17   Ht 5' 4\" (1.626 m)   Wt 107.4 kg (236 lb 12.4 oz)   LMP  (LMP Unknown)   SpO2 96%   BMI 40.64 kg/m²     08/13 1901 - 08/15 0700  In: 300 [P.O.:300]  Out: 800 [Urine:800]        Objective:      Physical Exam:  VS as above  Chest CTA  Card RRR no gallop      Data Review:   Labs:        Telemetry: SR       Assessment:     1. [place] with PCI of RCA in stent restenosis  2. Coronary artery disease.  NSTEMI 3/20 w/ cath @ [address]  RCA  attempt (wire would cross only); recurrent USA/NSETMI 5/20 w/ PCI of RCA w/ 2 JES and OM1 w/ 1 JES. 3. Hypokalemia  4. Diabetes  5. Hypertension  6. EM on CPAPP  7. GERD  8. Hyperlipidemia  9.  Tobacco abuse    Plan: OK to d/c on prior meds (ASA, Brilinta) and f/u in our office 2 weeks

## 2020-08-15 NOTE — PROGRESS NOTES
SHEATH PULL NOTE:    Patient informed of procedure with questions answered with review. Sheath site prepped with Chloraprep swab. 6 fr sheath in rfa pulled by MARY Rodrigez RN. Hand hold and quick clot, with manual compression to site. No bleeding, no hematoma, no pain at site. Hemostasis obtained with hand hold/manual compression at site. Patient tolerated well. No change in status. Handhold for 15 minutes. No change at site. Occlusive dressing applied to site. No bleeding, no hematoma, no pain/discomfort at site. Groin instructions provided with review. Continue to monitor procedure site and patient status. *Advised patient to keep head flat and extremity flat to decrease risk of bleeding. *Recommended that patient not drink for ONE HOUR post sheath pull completion. *Recommended that patient not eat for TWO HOURS post sheath pull completion. *Instructed patient on rationale for delay of PO products to decrease risk for aspiration and if additional treatment to procedure site is required. Patient verbalized understanding of instructions with review.

## 2020-08-15 NOTE — ED PROVIDER NOTES
EMERGENCY DEPARTMENT HISTORY AND PHYSICAL EXAM      Date: 8/15/2020  Patient Name: Jarrod Gonzalez    History of Presenting Illness     Chief Complaint   Patient presents with    Extremity Weakness     s/p cardiac cath; d/c today. back now for extremity weakness and states that she \"cant walk\" d/t pain. History Provided By: Patient and Patient's     HPI: Jarrod Gonzalez, 62 y.o. female presents to the ED with cc of left leg pain. Patient triage notes state patient complaining of right arm weakness. Patient reports this has been present for some time. Right shoulder pain, worse with movement. Improves with rest.    Patient was recently admitted to the hospital for an STEMI. She underwent cardiac catheterization and a drug-eluting stent was placed. Patient reports her right groin was accessed, and she did have some postprocedural issues with bleeding requiring multiple rounds of pressure dressings. Patient reports prior to discharge, she felt pain in her left leg and thought it had \"fallen asleep. \"  Patient states that upon returning home, she has had worsening pain. Pain is located in her left popliteal fossa. Worse with bearing weight. Patient versus associated weakness due to the pain. Patient denies any paresthesia or trauma. Patient otherwise in her normal state of health. There are no other complaints, changes, or physical findings at this time.     PCP: Ba Jacobson MD    Current Facility-Administered Medications on File Prior to Encounter   Medication Dose Route Frequency Provider Last Rate Last Dose    [DISCONTINUED] amLODIPine (NORVASC) tablet 10 mg  10 mg Oral DAILY Glenny Berrios MD   10 mg at 08/15/20 0856    [DISCONTINUED] aspirin delayed-release tablet 81 mg  81 mg Oral DAILY Everett Saint, MD   81 mg at 08/15/20 0856    [DISCONTINUED] atorvastatin (LIPITOR) tablet 80 mg  80 mg Oral QHS Everett Saint, MD   80 mg at 08/14/20 2232    [DISCONTINUED] isosorbide mononitrate ER (IMDUR) tablet 30 mg  30 mg Oral DAILY Agustina, Aida Martino MD   30 mg at 08/15/20 0856    [DISCONTINUED] lisinopriL (PRINIVIL, ZESTRIL) tablet 40 mg  40 mg Oral DAILY Din, Aida Martino MD   40 mg at 08/15/20 0856    [DISCONTINUED] metoprolol succinate (TOPROL-XL) XL tablet 100 mg  100 mg Oral BID Boy Mcclain MD   100 mg at 08/15/20 0856    [DISCONTINUED] pantoprazole (PROTONIX) tablet 40 mg  40 mg Oral DAILY Boy Mcclain MD   40 mg at 08/15/20 0856    [DISCONTINUED] spironolactone (ALDACTONE) tablet 25 mg  25 mg Oral DAILY Boy Mcclain MD   25 mg at 08/15/20 0856    [DISCONTINUED] ticagrelor (BRILINTA) tablet 90 mg  90 mg Oral BID Boy Mcclain MD   90 mg at 08/15/20 0856    [DISCONTINUED] sodium chloride (NS) flush 5-40 mL  5-40 mL IntraVENous Q8H Din, Liudmila CORONA MD   10 mL at 08/15/20 0532    [DISCONTINUED] sodium chloride (NS) flush 5-40 mL  5-40 mL IntraVENous PRN Din, Aida Martino MD        [DISCONTINUED] acetaminophen (TYLENOL) tablet 650 mg  650 mg Oral Q6H PRN Din, Aida Martino MD        [DISCONTINUED] acetaminophen (TYLENOL) suppository 650 mg  650 mg Rectal Q6H PRN Din, Aida Martino MD        [DISCONTINUED] polyethylene glycol (MIRALAX) packet 17 g  17 g Oral DAILY PRN Din, Aida Martino MD        [DISCONTINUED] promethazine (PHENERGAN) tablet 12.5 mg  12.5 mg Oral Q6H PRN Din, Aida Martino MD        [DISCONTINUED] ondansetron (ZOFRAN) injection 4 mg  4 mg IntraVENous Q6H PRN Din, Aida Martino MD        [DISCONTINUED] enoxaparin (LOVENOX) injection 40 mg  40 mg SubCUTAneous Q24H Boy Mcclain MD   40 mg at 08/14/20 2234    [DISCONTINUED] fentaNYL citrate (PF) injection 25 mcg  25 mcg IntraVENous Q2.5H PRN Dozier Artist III, DO   25 mcg at 08/15/20 1030     Current Outpatient Medications on File Prior to Encounter   Medication Sig Dispense Refill    ISOSORBIDE MONONITRATE PO Take 30 mg by mouth daily.  ticagrelor (BRILINTA) 90 mg tablet Take 90 mg by mouth two (2) times a day.       metoprolol tartrate (LOPRESSOR) 100 mg IR tablet Take 100 mg by mouth daily.  atorvastatin (LIPITOR) 80 mg tablet Take 80 mg by mouth nightly.  metFORMIN ER (GLUCOPHAGE XR) 750 mg tablet Take 750 mg by mouth two (2) times a day.  pantoprazole (PROTONIX) 40 mg tablet Take 40 mg by mouth daily.  amLODIPine (NORVASC) 10 mg tablet Take 10 mg by mouth daily.  aspirin delayed-release 81 mg tablet Take  by mouth daily.  lisinopriL (PRINIVIL, ZESTRIL) 40 mg tablet Take 40 mg by mouth daily.  spironolactone (ALDACTONE) 25 mg tablet Take 25 mg by mouth daily.  metoprolol succinate (TOPROL-XL) 100 mg tablet Take 100 mg by mouth two (2) times a day.  glipiZIDE (GLUCOTROL) 5 mg tablet Take 5 mg by mouth daily. Past History     Past Medical History:  Past Medical History:   Diagnosis Date    Calculus of kidney     Difficult intubation     per patient she was given 2 papers in regards to this, asked her to bring DOS.  DM (diabetes mellitus) ([facility] [facility]     GERD (gastroesophageal reflux disease)     Headache     Hypertension     NSTEMI (non-ST elevated myocardial infarction) ([facility] [address] 5/17/2020    Sleep apnea     no cpap- severe EM per patient       Past Surgical History:  Past Surgical History:   Procedure Laterality Date    HX GYN      ovary removed    HX HEENT Bilateral 2016    BMT's    HX OTHER SURGICAL  2010    KIDNEY STONES REMOVED       Family History:  History reviewed. No pertinent family history. Social History:  Social History     Tobacco Use    Smoking status: Current Every Day Smoker     Packs/day: 0.25    Smokeless tobacco: Never Used   Substance Use Topics    Alcohol use: No    Drug use: No       Allergies:  No Known Allergies      Review of Systems   Review of Systems   Constitutional: Negative for activity change, chills and fever. HENT: Negative for facial swelling and voice change. Eyes: Negative for redness.    Respiratory: Negative for cough, shortness of breath and wheezing. Cardiovascular: Negative for chest pain and leg swelling. Gastrointestinal: Negative for abdominal pain, diarrhea, nausea and vomiting. Genitourinary: Negative for decreased urine volume. Musculoskeletal: Positive for arthralgias and gait problem. Skin: Negative for pallor and rash. Neurological: Negative for tremors and facial asymmetry. Psychiatric/Behavioral: Negative for agitation. All other systems reviewed and are negative. Physical Exam   Physical Exam  Vitals signs and nursing note reviewed. Constitutional:       Comments: 81-year-old female, resting in bed, appears mildly uncomfortable   HENT:      Head: Normocephalic and atraumatic. Neck:      Musculoskeletal: Normal range of motion. Cardiovascular:      Rate and Rhythm: Normal rate and regular rhythm. Heart sounds: No murmur. No friction rub. No gallop. Comments: 2+ DP and PT pulses in the left lower extremity. Pulmonary:      Effort: Pulmonary effort is normal.      Breath sounds: Normal breath sounds. Abdominal:      Palpations: Abdomen is soft. Tenderness: There is no abdominal tenderness. Genitourinary:     Comments: Right inguinal dressing clean dry and intact. Minimal bleeding. No surrounding ecchymosis. Musculoskeletal: Normal range of motion. Comments: Tenderness palpation in the left popliteal fossa. Compartments are soft bilaterally. Skin:     General: Skin is warm. Capillary Refill: Capillary refill takes less than 2 seconds. Comments: No petechial rash. Neurological:      General: No focal deficit present. Mental Status: She is alert. Motor: No weakness. Comments: Flexion at the left knee limited by pain. No weakness distally.    Psychiatric:         Mood and Affect: Mood normal.         Diagnostic Study Results     Labs -     Recent Results (from the past 12 hour(s))   CBC WITH AUTOMATED DIFF    Collection Time: 08/15/20  7:19 PM   Result Value Ref Range    WBC 8.7 3.6 - 11.0 K/uL    RBC 4.24 3.80 - 5.20 M/uL    HGB 12.4 11.5 - 16.0 g/dL    HCT 38.0 35.0 - 47.0 %    MCV 89.6 80.0 - 99.0 FL    MCH 29.2 26.0 - 34.0 PG    MCHC 32.6 30.0 - 36.5 g/dL    RDW 14.6 (H) 11.5 - 14.5 %    PLATELET 796 231 - 820 K/uL    MPV 11.3 8.9 - 12.9 FL    NRBC 0.0 0  WBC    ABSOLUTE NRBC 0.00 0.00 - 0.01 K/uL    NEUTROPHILS 66 32 - 75 %    LYMPHOCYTES 26 12 - 49 %    MONOCYTES 6 5 - 13 %    EOSINOPHILS 1 0 - 7 %    BASOPHILS 0 0 - 1 %    IMMATURE GRANULOCYTES 1 (H) 0.0 - 0.5 %    ABS. NEUTROPHILS 5.7 1.8 - 8.0 K/UL    ABS. LYMPHOCYTES 2.3 0.8 - 3.5 K/UL    ABS. MONOCYTES 0.5 0.0 - 1.0 K/UL    ABS. EOSINOPHILS 0.1 0.0 - 0.4 K/UL    ABS. BASOPHILS 0.0 0.0 - 0.1 K/UL    ABS. IMM. GRANS. 0.0 0.00 - 0.04 K/UL    DF AUTOMATED     PROTHROMBIN TIME + INR    Collection Time: 08/15/20  7:19 PM   Result Value Ref Range    INR 1.0 0.9 - 1.1      Prothrombin time 10.8 9.0 - 93.1 sec   METABOLIC PANEL, COMPREHENSIVE    Collection Time: 08/15/20  7:19 PM   Result Value Ref Range    Sodium 140 136 - 145 mmol/L    Potassium 3.1 (L) 3.5 - 5.1 mmol/L    Chloride 106 97 - 108 mmol/L    CO2 29 21 - 32 mmol/L    Anion gap 5 5 - 15 mmol/L    Glucose 203 (H) 65 - 100 mg/dL    BUN 14 6 - 20 MG/DL    Creatinine 0.73 0.55 - 1.02 MG/DL    BUN/Creatinine ratio 19 12 - 20      GFR est AA >60 >60 ml/min/1.73m2    GFR est non-AA >60 >60 ml/min/1.73m2    Calcium 8.3 (L) 8.5 - 10.1 MG/DL    Bilirubin, total 0.3 0.2 - 1.0 MG/DL    ALT (SGPT) 29 12 - 78 U/L    AST (SGOT) 20 15 - 37 U/L    Alk.  phosphatase 60 45 - 117 U/L    Protein, total 7.4 6.4 - 8.2 g/dL    Albumin 3.3 (L) 3.5 - 5.0 g/dL    Globulin 4.1 (H) 2.0 - 4.0 g/dL    A-G Ratio 0.8 (L) 1.1 - 2.2     CK    Collection Time: 08/15/20  7:19 PM   Result Value Ref Range    CK 75 26 - 192 U/L   EKG, 12 LEAD, INITIAL    Collection Time: 08/15/20  7:30 PM   Result Value Ref Range    Ventricular Rate 63 BPM    Atrial Rate 63 BPM    P-R Interval 130 ms    QRS Duration 108 ms    Q-T Interval 478 ms    QTC Calculation (Bezet) 489 ms    Calculated P Axis 37 degrees    Calculated R Axis 1 degrees    Calculated T Axis -8 degrees    Diagnosis       Normal sinus rhythm  Cannot rule out Anterior infarct (cited on or before 14-AUG-2020)  When compared with ECG of 14-AUG-2020 17:15,  No significant change was found     ANKLE BRACHIAL INDEX    Collection Time: 08/15/20 10:17 PM   Result Value Ref Range    Left arm  mmHg    Right arm  mmHg    Left posterior tibial 179 mmHg    Right posterior tibial 170 mmHg    Left anterior tibial 154 mmHg    Right anterior tibial 156 mmHg    Left ANGELITO 1.28     Right ANGELITO 1.21     Left toe pressure 138 mmHg    Right toe pressure 127 mmHg    Left TBI 0.99     Right TBI 0.91     Left NYDIA level 138        Radiologic Studies -   No orders to display     CT Results  (Last 48 hours)    None        CXR Results  (Last 48 hours)    None          Medical Decision Making   I am the first provider for this patient. I reviewed the vital signs, available nursing notes, past medical history, past surgical history, family history and social history. Vital Signs-Reviewed the patient's vital signs.   Patient Vitals for the past 12 hrs:   Temp Pulse Resp BP SpO2   08/15/20 2230 -- 61 19 143/62 95 %   08/15/20 2215 -- (!) 58 19 135/60 94 %   08/15/20 2115 -- 76 22 142/62 97 %   08/15/20 2100 -- (!) 59 20 135/61 93 %   08/15/20 2045 -- (!) 58 22 144/74 93 %   08/15/20 2030 -- (!) 58 18 147/67 96 %   08/15/20 2015 -- (!) 56 20 134/59 92 %   08/15/20 2000 -- 62 19 142/63 95 %   08/15/20 1945 -- 62 20 121/57 93 %   08/15/20 1930 -- 69 18 138/59 97 %   08/15/20 1929 -- 66 18 138/59 96 %   08/15/20 1900 -- -- -- -- 96 %   08/15/20 1834 98.5 °F (36.9 °C) 71 16 144/69 96 %         Records Reviewed: Nursing Notes and Old Medical Records    Provider Notes (Medical Decision Making):     81-year-old female with a past medical history as above presents emergency department with left leg pain. Vital signs unremarkable. Patient does endorse left extremity weakness, but appears as though her range of motion is limited by pain. Neurovascular she is intact distally. Compartments are soft. Denies trauma    Differential diagnosis includes embolic phenomenon, no history of A. fib. Compartments are soft, doubt compartment syndrome. Ruptured Baker's cyst, less likely DVT, no evidence of infection. Doubt retroperitoneal hematoma. Check basic labs. EKG. ANGELITO. Pain control. ED Course:   Initial assessment performed. The patients presenting problems have been discussed, and they are in agreement with the care plan formulated and outlined with them. I have encouraged them to ask questions as they arise throughout their visit. ED Course as of Aug 15 2307   Sat Aug 15, 2020   2121 Labs reviewed with no leukocytosis. Coags normal.  Potassium is mildly low, I will replete. CK normal.  Awaiting vascular studies, if these are negative, anticipate patient will be able to be discharged. [MB]   2207 Discussed with vascular technician. ABIs were normal.  Awaiting duplex ultrasound. [MB]   2302 Preliminary duplex ultrasound negative for DVT. Patient reassessed, still some pain. Ultimately I am unable to elucidate the source of her pain. May be spasm or musculoskeletal in setting of prolonged bedrest.  Given pain, doubt central cause such as CVA. No fever to suggest infection. Compartments are soft. Consider ruptured Baker's cyst.    Discussed with patient, will give short course of oxycodone to minimize NSAIDs given she is on dual antiplatelet therapy. Patient also given Flexeril. Strict return precautions were provided. [MB]      ED Course User Index  [MB] MD Bhavik Lazo MD      Disposition:    Reassessments as above. Labs and ED course reviewed. Patient was discharged home and was provided strict return precautions. Patient to follow-up with PCP or specialist per discharge instructions or return to ED for worsening symptoms. DISCHARGE PLAN:  1. Current Discharge Medication List      START taking these medications    Details   oxyCODONE IR (Roxicodone) 5 mg immediate release tablet Take 1 Tab by mouth every six (6) hours as needed for Pain for up to 3 days. Max Daily Amount: 20 mg.  Qty: 12 Tab, Refills: 0    Associated Diagnoses: Arthralgia of left lower leg      cyclobenzaprine (FLEXERIL) 10 mg tablet Take 0.5 Tabs by mouth three (3) times daily as needed for Muscle Spasm(s). Qty: 12 Tab, Refills: 0           2. Follow-up Information     Follow up With Specialties Details Why Contact Info    Kenya Geronimo MD Family Medicine In 2 days  [address] [place] [address]  [phone]          3. Return to ED if worse     Diagnosis     Clinical Impression:   1. Arthralgia of left lower leg        Attestations:    Blanche Khan MD    Please note that this dictation was completed with [facility], the computer voice recognition software. Quite often unanticipated grammatical, syntax, homophones, and other interpretive errors are inadvertently transcribed by the computer software. Please disregard these errors. Please excuse any errors that have escaped final proofreading. Thank you.

## 2020-08-15 NOTE — PROGRESS NOTES
Problem: Falls - Risk of  Goal: *Absence of Falls  Description: Document Kaci Sebastian Fall Risk and appropriate interventions in the flowsheet. 8/15/2020 0804 by Orly Ashton  Outcome: Progressing Towards Goal  Note: Fall Risk Interventions:            Medication Interventions: Assess postural VS orthostatic hypotension, Bed/chair exit alarm, Evaluate medications/consider consulting pharmacy, Patient to call before getting OOB, Teach patient to arise slowly                8/15/2020 0801 by Orly Ashton  Outcome: Progressing Towards Goal  Note: Fall Risk Interventions:            Medication Interventions: Assess postural VS orthostatic hypotension, Bed/chair exit alarm, Evaluate medications/consider consulting pharmacy, Patient to call before getting OOB, Teach patient to arise slowly                   Problem: Patient Education: Go to Patient Education Activity  Goal: Patient/Family Education  8/15/2020 0804 by Orly Ashton  Outcome: Progressing Towards Goal  8/15/2020 0801 by Orly Ashton  Outcome: Progressing Towards Goal     Problem: Diabetes Self-Management  Goal: *Disease process and treatment process  Description: Define diabetes and identify own type of diabetes; list 3 options for treating diabetes. 8/15/2020 0804 by Orly Ashton  Outcome: Progressing Towards Goal  8/15/2020 0801 by Orly Ashton  Outcome: Progressing Towards Goal  Goal: *Incorporating nutritional management into lifestyle  Description: Describe effect of type, amount and timing of food on blood glucose; list 3 methods for planning meals. 8/15/2020 0804 by Orly Ashton  Outcome: Progressing Towards Goal  8/15/2020 0801 by Orly Ashton  Outcome: Progressing Towards Goal  Goal: *Incorporating physical activity into lifestyle  Description: State effect of exercise on blood glucose levels.   8/15/2020 0804 by Orly Ashton  Outcome: Progressing Towards Goal  8/15/2020 0801 by Orly Ashton  Outcome: Progressing Towards Goal  Goal: *Developing strategies to promote health/change behavior  Description: Define the ABC's of diabetes; identify appropriate screenings, schedule and personal plan for screenings. 8/15/2020 0804 by Juan Licea  Outcome: Progressing Towards Goal  8/15/2020 0801 by Juan Licea  Outcome: Progressing Towards Goal  Goal: *Using medications safely  Description: State effect of diabetes medications on diabetes; name diabetes medication taking, action and side effects. 8/15/2020 0804 by Juan Licea  Outcome: Progressing Towards Goal  8/15/2020 0801 by Juan Licea  Outcome: Progressing Towards Goal  Goal: *Monitoring blood glucose, interpreting and using results  Description: Identify recommended blood glucose targets  and personal targets. 8/15/2020 0804 by Juan Licea  Outcome: Progressing Towards Goal  8/15/2020 0801 by Juan Licea  Outcome: Progressing Towards Goal  Goal: *Prevention, detection, treatment of acute complications  Description: List symptoms of hyper- and hypoglycemia; describe how to treat low blood sugar and actions for lowering  high blood glucose level. 8/15/2020 0804 by Juan Licea  Outcome: Progressing Towards Goal  8/15/2020 0801 by Juan Licea  Outcome: Progressing Towards Goal  Goal: *Prevention, detection and treatment of chronic complications  Description: Define the natural course of diabetes and describe the relationship of blood glucose levels to long term complications of diabetes.   8/15/2020 0804 by Juan Licea  Outcome: Progressing Towards Goal  8/15/2020 0801 by Juan Licea  Outcome: Progressing Towards Goal  Goal: *Developing strategies to address psychosocial issues  Description: Describe feelings about living with diabetes; identify support needed and support network  8/15/2020 0804 by Juan Licea  Outcome: Progressing Towards Goal  8/15/2020 0801 by Juan Licea  Outcome: Progressing Towards Goal  Goal: *Insulin pump training  8/15/2020 0804 by Milagros Marcus  Outcome: Progressing Towards Goal  8/15/2020 0801 by Milagros Marcus  Outcome: Progressing Towards Goal  Goal: *Sick day guidelines  8/15/2020 0804 by Milagros Marcus  Outcome: Progressing Towards Goal  8/15/2020 0801 by Milagros Marcus  Outcome: Progressing Towards Goal  Goal: *Patient Specific Goal (EDIT GOAL, INSERT TEXT)  8/15/2020 0804 by Milagros Marcus  Outcome: Progressing Towards Goal  8/15/2020 0801 by Milagros Marcus  Outcome: Progressing Towards Goal     Problem: Patient Education: Go to Patient Education Activity  Goal: Patient/Family Education  8/15/2020 0804 by Milagros Marcus  Outcome: Progressing Towards Goal  8/15/2020 0801 by Milagros Marcus  Outcome: Progressing Towards Goal     Problem: Pain  Goal: *Control of Pain  Outcome: Progressing Towards Goal  Goal: *PALLIATIVE CARE:  Alleviation of Pain  Outcome: Progressing Towards Goal     Problem: Patient Education: Go to Patient Education Activity  Goal: Patient/Family Education  Outcome: Progressing Towards Goal     Problem: Pressure Injury - Risk of  Goal: *Prevention of pressure injury  Description: Document Leonardo Scale and appropriate interventions in the flowsheet.   Outcome: Progressing Towards Goal  Note: Pressure Injury Interventions:                                            Problem: Patient Education: Go to Patient Education Activity  Goal: Patient/Family Education  Outcome: Progressing Towards Goal     Problem: Diabetes Maintenance:Admission  Goal: Activity/Safety  Outcome: Progressing Towards Goal  Goal: Diagnostic Tests/Procedures  Outcome: Progressing Towards Goal  Goal: Nutrition  Outcome: Progressing Towards Goal  Goal: Medications  Outcome: Progressing Towards Goal  Goal: Treatments/Interventions/Procedures  Outcome: Progressing Towards Goal     Problem: Diabetes Maintenance:Ongoing  Goal: Activity/Safety  Outcome: Progressing Towards Goal  Goal: Nutrition  Outcome: Progressing Towards Goal  Goal: Medications  Outcome: Progressing Towards Goal  Goal: Treatments/Interventsions/Procedures  Outcome: Progressing Towards Goal  Goal: *Blood Glucose 80 to 180 md/dl  Outcome: Progressing Towards Goal     Problem: Diabetes Maintenance:Discharge Outcomes  Goal: *Describes follow-up/return visits to physicians  Outcome: Progressing Towards Goal  Goal: *Blood glucose at patient's target range or approaching  Outcome: Progressing Towards Goal  Goal: *Aware of nutrition guidelines  Outcome: Progressing Towards Goal  Goal: *Verbalizes information about medication  Description: Verbalizes name, dosage, time, side effects, and number of days to  continue medications.   Outcome: Progressing Towards Goal  Goal: *Describes goals, rules, symptoms, and treatments  Description: Describes blood glucose goals, monitoring, sick day rules,  hypo/hyperglycemia prevention, symptoms, and treatment  Outcome: Progressing Towards Goal  Goal: *Describes available outpatient diabetes resources and support systems  Outcome: Progressing Towards Goal

## 2020-08-16 VITALS
SYSTOLIC BLOOD PRESSURE: 146 MMHG | HEIGHT: 64 IN | DIASTOLIC BLOOD PRESSURE: 71 MMHG | HEART RATE: 60 BPM | RESPIRATION RATE: 20 BRPM | WEIGHT: 236.77 LBS | OXYGEN SATURATION: 93 % | TEMPERATURE: 98.5 F | BODY MASS INDEX: 40.42 KG/M2

## 2020-08-16 LAB
ATRIAL RATE: 63 BPM
CALCULATED P AXIS, ECG09: 37 DEGREES
CALCULATED R AXIS, ECG10: 1 DEGREES
CALCULATED T AXIS, ECG11: -8 DEGREES
DIAGNOSIS, 93000: NORMAL
P-R INTERVAL, ECG05: 130 MS
Q-T INTERVAL, ECG07: 478 MS
QRS DURATION, ECG06: 108 MS
QTC CALCULATION (BEZET), ECG08: 489 MS
VENTRICULAR RATE, ECG03: 63 BPM

## 2020-08-16 NOTE — DISCHARGE INSTRUCTIONS
Please take motrin and tylenol as needed for pain. You can also use the oxycodone for severe pain. You were also given a muscle relaxer. There were sent to your pharmacy. Please do not take the pain medication and the muscle relaxer together as these can make you sleepy. Return to the ER for worsening pain, we are happy to reevaluate you at any time.

## 2020-08-16 NOTE — PROGRESS NOTES
PAT [facility]  Preliminary Report: Ankle Brachial Index    Pressure (mmHg) Right    Left    Brachial:  140   140   Ankle PTA:  170   179  Ankle DPA:  156   154  Great Toe:  127   138    Waveform:  Right   Left  Thigh:        Calf:   normal   normal  Ankle:   normal   normal  Metatarsal:  normal   normal  Great Toe:  Pulsatile  Pulsatile    Right ANGELITO:   1.21  Left ANGELITO: 1.28  Right TBI: 0.91  Left TBI: 0.99    Thigh pressures were not obtained due to patient in pain and limited mobility, unable to lift leg. (left popliteal area)    Final report to follow.     CFA = Common Femoral Artery  PTA = Posterior Tibial Artery  DPA = Dorsalis Pedis Artery  ANGELITO = Ankle Brachial Index  TBI = Toe Brachial Index  NC = Non-compressible

## 2020-08-16 NOTE — ED NOTES
Patient was given verbal and written discharge instructions. All questions were answered and the patient verbalized understanding.  Patient left the department via wheelchair

## 2020-08-17 ENCOUNTER — TELEPHONE (OUTPATIENT)
Dept: CARDIAC REHAB | Age: 60
End: 2020-08-17

## 2020-08-17 ENCOUNTER — TELEPHONE (OUTPATIENT)
Dept: CASE MANAGEMENT | Age: 60
End: 2020-08-17

## 2020-08-17 LAB
LEFT ABI: 1.28
LEFT ANTERIOR TIBIAL: 154 MMHG
LEFT ARM BP: 140 MMHG
LEFT ATA BP LEVEL: 138
LEFT POSTERIOR TIBIAL: 179 MMHG
LEFT TBI: 0.99
LEFT TOE PRESSURE: 138 MMHG
RIGHT ABI: 1.21
RIGHT ANTERIOR TIBIAL: 156 MMHG
RIGHT ARM BP: 140 MMHG
RIGHT POSTERIOR TIBIAL: 170 MMHG
RIGHT TBI: 0.91
RIGHT TOE PRESSURE: 127 MMHG

## 2020-08-17 NOTE — TELEPHONE ENCOUNTER
20 3:15 PM     Patient contacted regarding recent discharge and COVID-19 risk. Discussed COVID-19 related testing which was not done at this time. Test results were not done. Patient informed of results, if available? N/A    Care Transition Nurse/ Ambulatory Care Manager/ LPN Care Coordinator contacted the family by telephone to perform post discharge assessment. Verified name and  with family as identifiers. Patient has following risk factors of: diabetes. CTN/ACM/LPN reviewed discharge instructions, medical action plan and red flags related to discharge diagnosis. Reviewed and educated them on any new and changed medications related to discharge diagnosis. Pt's  confirms that they obtained the D/C prescriptions from her ED visit and that she appears to have a good understanding of her usual medications, which have not changed, and does not seem to need a med review with our pharmacist by phone. Reviewed recommended F/U appts from hospital stay and  agrees to help her make these, including with her non-BS PCP. He states that they were told her tests of her leg weakness were negative but she is only slowly improving. I advised him to let the PCP know if not improving well so he will be aware and can decide if further testing is required.  agrees with this plan. Advance Care Planning:   Does patient have an Advance Directive: not on file; reviewed what this document is and why it's important, and advised that both pt and her  discuss this with PCP and ask for blank copy to review at home. Education not provided regarding infection prevention, and signs and symptoms of COVID-19 and when to seek medical attention because family verbalized he has no questions today. From CDC: Are you at higher risk for severe illness?  Wash your hands often.  Avoid close contact (6 feet, which is about two arm lengths) with people who are sick.    Put distance between yourself and other people if COVID-19 is spreading in your community.  Clean and disinfect frequently touched surfaces.  Avoid all cruise travel and non-essential air travel.  Call your healthcare professional if you have concerns about COVID-19 and your underlying condition or if you are sick. For more information on steps you can take to protect yourself, see CDC's How to Protect Yourself      Encouraged pt's  to help pt activate MyChart and with his permission, I've sent an email invitation. Advised that if they activate MyChart in the next week or so, I'll send our COVID-19 information and phone resources to them via the portal.    Reviewed and updated health care decision makers while on phone with pt's . Patient/family/caregiver given information for [employer] and agrees to enroll yes  Patient's preferred e-mail:  [email or web address]  Patient's preferred phone number: N/A  Based on Loop alert triggers, patient will be contacted by nurse care manager for worsening symptoms. Pt will be further monitored by COVID Loop Team based on severity of symptoms and risk factors.

## 2020-08-17 NOTE — TELEPHONE ENCOUNTER
Cardiopulmonary Rehab Nursing Entry:    Phone call to check on status of enrollment in out-patient Cardiac Rehab Program.  Left message on voicemail. Pt will receive educational materials via mail to her home address.

## 2021-03-05 ENCOUNTER — TELEPHONE (OUTPATIENT)
Dept: CARDIAC REHAB | Age: 61
End: 2021-03-05

## 2021-03-05 NOTE — TELEPHONE ENCOUNTER
Cardiac Rehab: Eva Ahr on 3/5/21 to discuss possible participation in the Cardiac Rehab Program following her stent on 8/14/20. Left message on answering machine.

## 2021-03-11 ENCOUNTER — TELEPHONE (OUTPATIENT)
Dept: CARDIAC REHAB | Age: 61
End: 2021-03-11

## 2021-03-18 NOTE — Clinical Note
Catheter removed. [address] Transitions Follow Up Call    3/18/2021    Patient: Adithya Haile  Patient : 1934   MRN: [id number]  Reason for Admission: PNA  Discharge Date: 3/12/21 RARS: Readmission Risk Score: 25         Spoke with: daughter \"Maxine\", \"Keith\" [facility] [address] Transitions Subsequent and Final Call    Subsequent and Final Calls  Do you have any ongoing symptoms?: No  Have your medications changed?: No  Do you have any questions related to your medications?: No  Do you currently have any active services?: Yes  Are you currently active with any services?: Home Health  Do you have any needs or concerns that I can assist you with?: No  Identified Barriers: None  Care Transitions Interventions  Other Interventions: Follow Up: Daughter reports that patient is doing well, feeling better since ablation. She is tired, but daughter thinks that may be related to their initial COVID vaccine. Patient is ready for [address] to be resumed for patient, she is getting stronger and may be able to work with the therapist.  CTN contacted \"Keith\" [phone] [place], she will confirm that they have everything they need and let CTN know if there is any further information required for resumption of care. CTN will remain available and continue with outreach follow up calls.     Future Appointments   Date Time Provider Alissa Bustillo   2021  3:15 PM Jesu Turner MD [facility]   2021  2:30 PM KATHY Mathew - CNP FF Cardio [facility]   2021  3:30 PM Denice Mckeon MD PULM & CC [facility]       Edel Ceja RN

## 2022-02-22 ENCOUNTER — APPOINTMENT (OUTPATIENT)
Dept: CT IMAGING | Age: 62
DRG: 191 | End: 2022-02-22
Attending: EMERGENCY MEDICINE
Payer: MEDICAID

## 2022-02-22 ENCOUNTER — HOSPITAL ENCOUNTER (INPATIENT)
Age: 62
LOS: 2 days | Discharge: HOME | DRG: 191 | End: 2022-02-24
Attending: EMERGENCY MEDICINE | Admitting: INTERNAL MEDICINE
Payer: MEDICAID

## 2022-02-22 ENCOUNTER — APPOINTMENT (OUTPATIENT)
Dept: GENERAL RADIOLOGY | Age: 62
DRG: 191 | End: 2022-02-22
Attending: EMERGENCY MEDICINE
Payer: MEDICAID

## 2022-02-22 DIAGNOSIS — I21.4 NSTEMI (NON-ST ELEVATED MYOCARDIAL INFARCTION) (HCC): ICD-10-CM

## 2022-02-22 DIAGNOSIS — R91.1 PULMONARY NODULE: ICD-10-CM

## 2022-02-22 DIAGNOSIS — R07.9 ACUTE CHEST PAIN: Primary | ICD-10-CM

## 2022-02-22 LAB
ALBUMIN SERPL-MCNC: 3.3 G/DL (ref 3.5–5)
ALBUMIN/GLOB SERPL: 0.9 {RATIO} (ref 1.1–2.2)
ALP SERPL-CCNC: 63 U/L (ref 45–117)
ALT SERPL-CCNC: 28 U/L (ref 12–78)
ANION GAP SERPL CALC-SCNC: 6 MMOL/L (ref 5–15)
APTT PPP: 24.5 SEC (ref 22.1–31)
AST SERPL-CCNC: 10 U/L (ref 15–37)
ATRIAL RATE: 68 BPM
BASOPHILS # BLD: 0.1 K/UL (ref 0–0.1)
BASOPHILS # BLD: 0.1 K/UL (ref 0–0.1)
BASOPHILS NFR BLD: 1 % (ref 0–1)
BASOPHILS NFR BLD: 1 % (ref 0–1)
BILIRUB SERPL-MCNC: 0.3 MG/DL (ref 0.2–1)
BNP SERPL-MCNC: 355 PG/ML
BUN SERPL-MCNC: 13 MG/DL (ref 6–20)
BUN/CREAT SERPL: 16 (ref 12–20)
CALCIUM SERPL-MCNC: 8.9 MG/DL (ref 8.5–10.1)
CALCULATED P AXIS, ECG09: 64 DEGREES
CALCULATED R AXIS, ECG10: -6 DEGREES
CALCULATED T AXIS, ECG11: 11 DEGREES
CHLORIDE SERPL-SCNC: 108 MMOL/L (ref 97–108)
CO2 SERPL-SCNC: 25 MMOL/L (ref 21–32)
CREAT SERPL-MCNC: 0.81 MG/DL (ref 0.55–1.02)
DIAGNOSIS, 93000: NORMAL
DIFFERENTIAL METHOD BLD: ABNORMAL
DIFFERENTIAL METHOD BLD: NORMAL
EOSINOPHIL # BLD: 0.4 K/UL (ref 0–0.4)
EOSINOPHIL # BLD: 0.6 K/UL (ref 0–0.4)
EOSINOPHIL NFR BLD: 5 % (ref 0–7)
EOSINOPHIL NFR BLD: 6 % (ref 0–7)
ERYTHROCYTE [DISTWIDTH] IN BLOOD BY AUTOMATED COUNT: 14.2 % (ref 11.5–14.5)
ERYTHROCYTE [DISTWIDTH] IN BLOOD BY AUTOMATED COUNT: 14.2 % (ref 11.5–14.5)
GLOBULIN SER CALC-MCNC: 3.7 G/DL (ref 2–4)
GLUCOSE BLD STRIP.AUTO-MCNC: 170 MG/DL (ref 65–117)
GLUCOSE SERPL-MCNC: 237 MG/DL (ref 65–100)
HCT VFR BLD AUTO: 38.7 % (ref 35–47)
HCT VFR BLD AUTO: 38.8 % (ref 35–47)
HGB BLD-MCNC: 12.6 G/DL (ref 11.5–16)
HGB BLD-MCNC: 12.8 G/DL (ref 11.5–16)
IMM GRANULOCYTES # BLD AUTO: 0 K/UL (ref 0–0.04)
IMM GRANULOCYTES # BLD AUTO: 0 K/UL (ref 0–0.04)
IMM GRANULOCYTES NFR BLD AUTO: 0 % (ref 0–0.5)
IMM GRANULOCYTES NFR BLD AUTO: 0 % (ref 0–0.5)
LYMPHOCYTES # BLD: 3.4 K/UL (ref 0.8–3.5)
LYMPHOCYTES # BLD: 4.3 K/UL (ref 0.8–3.5)
LYMPHOCYTES NFR BLD: 38 % (ref 12–49)
LYMPHOCYTES NFR BLD: 42 % (ref 12–49)
MCH RBC QN AUTO: 29 PG (ref 26–34)
MCH RBC QN AUTO: 29.4 PG (ref 26–34)
MCHC RBC AUTO-ENTMCNC: 32.5 G/DL (ref 30–36.5)
MCHC RBC AUTO-ENTMCNC: 33.1 G/DL (ref 30–36.5)
MCV RBC AUTO: 89 FL (ref 80–99)
MCV RBC AUTO: 89.4 FL (ref 80–99)
MONOCYTES # BLD: 0.5 K/UL (ref 0–1)
MONOCYTES # BLD: 0.5 K/UL (ref 0–1)
MONOCYTES NFR BLD: 5 % (ref 5–13)
MONOCYTES NFR BLD: 5 % (ref 5–13)
NEUTS SEG # BLD: 4.6 K/UL (ref 1.8–8)
NEUTS SEG # BLD: 4.8 K/UL (ref 1.8–8)
NEUTS SEG NFR BLD: 46 % (ref 32–75)
NEUTS SEG NFR BLD: 51 % (ref 32–75)
NRBC # BLD: 0 K/UL (ref 0–0.01)
NRBC # BLD: 0 K/UL (ref 0–0.01)
NRBC BLD-RTO: 0 PER 100 WBC
NRBC BLD-RTO: 0 PER 100 WBC
P-R INTERVAL, ECG05: 160 MS
PLATELET # BLD AUTO: 268 K/UL (ref 150–400)
PLATELET # BLD AUTO: 275 K/UL (ref 150–400)
PMV BLD AUTO: 10.6 FL (ref 8.9–12.9)
PMV BLD AUTO: 10.7 FL (ref 8.9–12.9)
POTASSIUM SERPL-SCNC: 4.2 MMOL/L (ref 3.5–5.1)
PROT SERPL-MCNC: 7 G/DL (ref 6.4–8.2)
Q-T INTERVAL, ECG07: 408 MS
QRS DURATION, ECG06: 86 MS
QTC CALCULATION (BEZET), ECG08: 433 MS
RBC # BLD AUTO: 4.34 M/UL (ref 3.8–5.2)
RBC # BLD AUTO: 4.35 M/UL (ref 3.8–5.2)
SERVICE CMNT-IMP: ABNORMAL
SODIUM SERPL-SCNC: 139 MMOL/L (ref 136–145)
THERAPEUTIC RANGE,PTTT: NORMAL SECS (ref 58–77)
TROPONIN-HIGH SENSITIVITY: 187 NG/L (ref 0–51)
TROPONIN-HIGH SENSITIVITY: 201 NG/L (ref 0–51)
VENTRICULAR RATE, ECG03: 68 BPM
WBC # BLD AUTO: 10.3 K/UL (ref 3.6–11)
WBC # BLD AUTO: 9 K/UL (ref 3.6–11)

## 2022-02-22 PROCEDURE — 65270000029 HC RM PRIVATE

## 2022-02-22 PROCEDURE — 36415 COLL VENOUS BLD VENIPUNCTURE: CPT

## 2022-02-22 PROCEDURE — 71275 CT ANGIOGRAPHY CHEST: CPT

## 2022-02-22 PROCEDURE — 83880 ASSAY OF NATRIURETIC PEPTIDE: CPT

## 2022-02-22 PROCEDURE — 84484 ASSAY OF TROPONIN QUANT: CPT

## 2022-02-22 PROCEDURE — 74011000636 HC RX REV CODE- 636: Performed by: EMERGENCY MEDICINE

## 2022-02-22 PROCEDURE — 85730 THROMBOPLASTIN TIME PARTIAL: CPT

## 2022-02-22 PROCEDURE — 96376 TX/PRO/DX INJ SAME DRUG ADON: CPT

## 2022-02-22 PROCEDURE — 80053 COMPREHEN METABOLIC PANEL: CPT

## 2022-02-22 PROCEDURE — 65660000000 HC RM CCU STEPDOWN

## 2022-02-22 PROCEDURE — 85025 COMPLETE CBC W/AUTO DIFF WBC: CPT

## 2022-02-22 PROCEDURE — 74011000250 HC RX REV CODE- 250: Performed by: INTERNAL MEDICINE

## 2022-02-22 PROCEDURE — 99285 EMERGENCY DEPT VISIT HI MDM: CPT

## 2022-02-22 PROCEDURE — 93005 ELECTROCARDIOGRAM TRACING: CPT

## 2022-02-22 PROCEDURE — 82962 GLUCOSE BLOOD TEST: CPT

## 2022-02-22 PROCEDURE — 71045 X-RAY EXAM CHEST 1 VIEW: CPT

## 2022-02-22 PROCEDURE — 74011250637 HC RX REV CODE- 250/637: Performed by: EMERGENCY MEDICINE

## 2022-02-22 PROCEDURE — 96374 THER/PROPH/DIAG INJ IV PUSH: CPT

## 2022-02-22 PROCEDURE — 74011250636 HC RX REV CODE- 250/636: Performed by: EMERGENCY MEDICINE

## 2022-02-22 PROCEDURE — 74011000258 HC RX REV CODE- 258: Performed by: EMERGENCY MEDICINE

## 2022-02-22 RX ORDER — ATORVASTATIN CALCIUM 40 MG/1
80 TABLET, FILM COATED ORAL
Status: DISCONTINUED | OUTPATIENT
Start: 2022-02-22 | End: 2022-02-24 | Stop reason: HOSPADM

## 2022-02-22 RX ORDER — AMLODIPINE BESYLATE 5 MG/1
10 TABLET ORAL DAILY
Status: DISCONTINUED | OUTPATIENT
Start: 2022-02-23 | End: 2022-02-24 | Stop reason: HOSPADM

## 2022-02-22 RX ORDER — ACETAMINOPHEN 325 MG/1
650 TABLET ORAL
Status: DISCONTINUED | OUTPATIENT
Start: 2022-02-22 | End: 2022-02-24 | Stop reason: HOSPADM

## 2022-02-22 RX ORDER — ISOSORBIDE MONONITRATE 30 MG/1
30 TABLET, EXTENDED RELEASE ORAL DAILY
Status: DISCONTINUED | OUTPATIENT
Start: 2022-02-23 | End: 2022-02-23

## 2022-02-22 RX ORDER — SODIUM CHLORIDE 0.9 % (FLUSH) 0.9 %
5-40 SYRINGE (ML) INJECTION AS NEEDED
Status: DISCONTINUED | OUTPATIENT
Start: 2022-02-22 | End: 2022-02-24 | Stop reason: HOSPADM

## 2022-02-22 RX ORDER — DEXTROSE MONOHYDRATE 100 MG/ML
0-250 INJECTION, SOLUTION INTRAVENOUS AS NEEDED
Status: DISCONTINUED | OUTPATIENT
Start: 2022-02-22 | End: 2022-02-24 | Stop reason: HOSPADM

## 2022-02-22 RX ORDER — CYCLOBENZAPRINE HCL 10 MG
5 TABLET ORAL
Status: DISCONTINUED | OUTPATIENT
Start: 2022-02-22 | End: 2022-02-24 | Stop reason: HOSPADM

## 2022-02-22 RX ORDER — ONDANSETRON 4 MG/1
4 TABLET, ORALLY DISINTEGRATING ORAL
Status: DISCONTINUED | OUTPATIENT
Start: 2022-02-22 | End: 2022-02-24 | Stop reason: HOSPADM

## 2022-02-22 RX ORDER — ASPIRIN 325 MG
325 TABLET ORAL ONCE
Status: COMPLETED | OUTPATIENT
Start: 2022-02-22 | End: 2022-02-22

## 2022-02-22 RX ORDER — PANTOPRAZOLE SODIUM 40 MG/1
40 TABLET, DELAYED RELEASE ORAL DAILY
Status: DISCONTINUED | OUTPATIENT
Start: 2022-02-23 | End: 2022-02-24 | Stop reason: HOSPADM

## 2022-02-22 RX ORDER — HEPARIN SODIUM 1000 [USP'U]/ML
2000 INJECTION, SOLUTION INTRAVENOUS; SUBCUTANEOUS AS NEEDED
Status: DISCONTINUED | OUTPATIENT
Start: 2022-02-23 | End: 2022-02-24 | Stop reason: ALTCHOICE

## 2022-02-22 RX ORDER — ASPIRIN 81 MG/1
81 TABLET ORAL DAILY
Status: DISCONTINUED | OUTPATIENT
Start: 2022-02-23 | End: 2022-02-24 | Stop reason: HOSPADM

## 2022-02-22 RX ORDER — LISINOPRIL 20 MG/1
40 TABLET ORAL DAILY
Status: DISCONTINUED | OUTPATIENT
Start: 2022-02-23 | End: 2022-02-24 | Stop reason: HOSPADM

## 2022-02-22 RX ORDER — SPIRONOLACTONE 25 MG/1
25 TABLET ORAL DAILY
Status: DISCONTINUED | OUTPATIENT
Start: 2022-02-23 | End: 2022-02-24 | Stop reason: HOSPADM

## 2022-02-22 RX ORDER — POLYETHYLENE GLYCOL 3350 17 G/17G
17 POWDER, FOR SOLUTION ORAL DAILY PRN
Status: DISCONTINUED | OUTPATIENT
Start: 2022-02-22 | End: 2022-02-24 | Stop reason: HOSPADM

## 2022-02-22 RX ORDER — HEPARIN SODIUM 10000 [USP'U]/100ML
9-25 INJECTION, SOLUTION INTRAVENOUS
Status: DISCONTINUED | OUTPATIENT
Start: 2022-02-22 | End: 2022-02-24

## 2022-02-22 RX ORDER — ONDANSETRON 2 MG/ML
4 INJECTION INTRAMUSCULAR; INTRAVENOUS
Status: DISCONTINUED | OUTPATIENT
Start: 2022-02-22 | End: 2022-02-24 | Stop reason: HOSPADM

## 2022-02-22 RX ORDER — HEPARIN SODIUM 1000 [USP'U]/ML
4000 INJECTION, SOLUTION INTRAVENOUS; SUBCUTANEOUS ONCE
Status: COMPLETED | OUTPATIENT
Start: 2022-02-22 | End: 2022-02-22

## 2022-02-22 RX ORDER — HEPARIN SODIUM 1000 [USP'U]/ML
4000 INJECTION, SOLUTION INTRAVENOUS; SUBCUTANEOUS AS NEEDED
Status: DISCONTINUED | OUTPATIENT
Start: 2022-02-23 | End: 2022-02-24 | Stop reason: ALTCHOICE

## 2022-02-22 RX ORDER — SODIUM CHLORIDE 0.9 % (FLUSH) 0.9 %
5-40 SYRINGE (ML) INJECTION EVERY 8 HOURS
Status: DISCONTINUED | OUTPATIENT
Start: 2022-02-22 | End: 2022-02-24 | Stop reason: HOSPADM

## 2022-02-22 RX ORDER — ACETAMINOPHEN 650 MG/1
650 SUPPOSITORY RECTAL
Status: DISCONTINUED | OUTPATIENT
Start: 2022-02-22 | End: 2022-02-24 | Stop reason: HOSPADM

## 2022-02-22 RX ORDER — INSULIN LISPRO 100 [IU]/ML
INJECTION, SOLUTION INTRAVENOUS; SUBCUTANEOUS
Status: DISCONTINUED | OUTPATIENT
Start: 2022-02-22 | End: 2022-02-24 | Stop reason: HOSPADM

## 2022-02-22 RX ORDER — MAGNESIUM SULFATE 100 %
4 CRYSTALS MISCELLANEOUS AS NEEDED
Status: DISCONTINUED | OUTPATIENT
Start: 2022-02-22 | End: 2022-02-24 | Stop reason: HOSPADM

## 2022-02-22 RX ADMIN — HEPARIN SODIUM 9 UNITS/KG/HR: 1000 INJECTION INTRAVENOUS; SUBCUTANEOUS at 21:27

## 2022-02-22 RX ADMIN — IOPAMIDOL 100 ML: 755 INJECTION, SOLUTION INTRAVENOUS at 17:55

## 2022-02-22 RX ADMIN — NITROGLYCERIN 0.5 INCH: 20 OINTMENT TOPICAL at 19:15

## 2022-02-22 RX ADMIN — HEPARIN SODIUM 4000 UNITS: 1000 INJECTION INTRAVENOUS; SUBCUTANEOUS at 20:38

## 2022-02-22 RX ADMIN — SODIUM CHLORIDE, PRESERVATIVE FREE 10 ML: 5 INJECTION INTRAVENOUS at 22:00

## 2022-02-22 RX ADMIN — ASPIRIN 325 MG ORAL TABLET 325 MG: 325 PILL ORAL at 19:15

## 2022-02-22 NOTE — ED PROVIDER NOTES
EMERGENCY DEPARTMENT HISTORY AND PHYSICAL EXAM      Date: 2/22/2022  Patient Name: Franklyn Marx    History of Presenting Illness     Chief Complaint   Patient presents with    Numbness     Pt ambulatory into triage with a cc of chest heaviness and hand numbness; pt woke up this morning with right hand numbness; radial pulse present, patient able to squeeze hands    Chest Pain     Pt complaining of chest heaviness x 2-3 weeks that comes and goes       History Provided By: Patient    HPI: Franklyn Marx, 65 y.o. female presents to the ED with cc of chest heaviness. Patient has a history of coronary artery disease. She has had chest heaviness off and on for 2 to 3 weeks. The longest duration of the pain is 1 minute. Patient states that sometimes it feels like a heaviness and at other times it becomes more severe. Pain is located midsternal region. There is no radiation to the neck, back, jaw or arms. The worst pain level was 9 out of 10 in severity. Patient denies any pain currently. She admits that she has not been taking her aspirin as she supposed to. She ignored her symptoms until today, when she had right hand numbness. It was present when she woke up this morning. Initially, she thought she had slept on it, but the numbness remained. At this point the only part of her hand that is numb, is the right second finger. There are no other complaints, changes, or physical findings at this time. PCP: Amberly Bates MD    No current facility-administered medications on file prior to encounter. Current Outpatient Medications on File Prior to Encounter   Medication Sig Dispense Refill    cyclobenzaprine (FLEXERIL) 10 mg tablet Take 0.5 Tabs by mouth three (3) times daily as needed for Muscle Spasm(s). 12 Tab 0    ISOSORBIDE MONONITRATE PO Take 30 mg by mouth daily.  ticagrelor (BRILINTA) 90 mg tablet Take 90 mg by mouth two (2) times a day.       atorvastatin (LIPITOR) 80 mg tablet Take 80 mg by mouth nightly.  metFORMIN ER (GLUCOPHAGE XR) 750 mg tablet Take 750 mg by mouth two (2) times a day.  pantoprazole (PROTONIX) 40 mg tablet Take 40 mg by mouth daily.  amLODIPine (NORVASC) 10 mg tablet Take 10 mg by mouth daily.  aspirin delayed-release 81 mg tablet Take  by mouth daily.  lisinopriL (PRINIVIL, ZESTRIL) 40 mg tablet Take 40 mg by mouth daily.  spironolactone (ALDACTONE) 25 mg tablet Take 25 mg by mouth daily.  metoprolol succinate (TOPROL-XL) 100 mg tablet Take 100 mg by mouth two (2) times a day.  glipiZIDE (GLUCOTROL) 5 mg tablet Take 5 mg by mouth daily. Past History     Past Medical History:  Past Medical History:   Diagnosis Date    Calculus of kidney     Difficult intubation     per patient she was given 2 papers in regards to this, asked her to bring DOS.  DM (diabetes mellitus) ([facility] [address]     GERD (gastroesophageal reflux disease)     Headache     Hypertension     [facility]    Sleep apnea     no cpap- severe EM per patient       Past Surgical History:  Past Surgical History:   Procedure Laterality Date    HX GYN      ovary removed    HX HEENT Bilateral 2016    BMT's    HX OTHER SURGICAL  2010    KIDNEY STONES REMOVED       Family History:  No family history on file. Social History:  Social History     Tobacco Use    Smoking status: Current Every Day Smoker     Packs/day: 0.25    Smokeless tobacco: Never Used   Substance Use Topics    Alcohol use: No    Drug use: No       Allergies:  No Known Allergies      Review of Systems   Review of Systems   Constitutional: Negative for fever. HENT: Negative for congestion. Eyes: Negative. Respiratory: Negative for shortness of breath. Cardiovascular: Positive for chest pain. Gastrointestinal: Negative for abdominal pain. Endocrine: Negative for heat intolerance. Genitourinary: Negative.     Musculoskeletal: Negative for back pain. Skin: Negative for rash. Allergic/Immunologic: Negative for immunocompromised state. Neurological: Positive for numbness. Hematological: Does not bruise/bleed easily. Psychiatric/Behavioral: Negative. All other systems reviewed and are negative. Physical Exam   Physical Exam  Vitals and nursing note reviewed. Constitutional:       General: She is not in acute distress. Appearance: She is well-developed. HENT:      Head: Normocephalic. Cardiovascular:      Rate and Rhythm: Normal rate and regular rhythm. Heart sounds: Normal heart sounds. Pulmonary:      Effort: Pulmonary effort is normal.      Breath sounds: Normal breath sounds. Chest:      Chest wall: No tenderness. Abdominal:      General: Bowel sounds are normal.      Palpations: Abdomen is soft. Tenderness: There is no abdominal tenderness. Musculoskeletal:         General: Normal range of motion. Cervical back: Normal range of motion and neck supple. Skin:     General: Skin is warm and dry. Neurological:      Mental Status: She is alert and oriented to person, place, and time.       Comments: motor intact, decreased sensation to right second finger   Psychiatric:         Mood and Affect: Mood normal.         Behavior: Behavior normal.         Diagnostic Study Results     Labs -     Recent Results (from the past 12 hour(s))   EKG, 12 LEAD, INITIAL    Collection Time: 02/22/22  3:10 PM   Result Value Ref Range    Ventricular Rate 68 BPM    Atrial Rate 68 BPM    P-R Interval 160 ms    QRS Duration 86 ms    Q-T Interval 408 ms    QTC Calculation (Bezet) 433 ms    Calculated P Axis 64 degrees    Calculated R Axis -6 degrees    Calculated T Axis 11 degrees    Diagnosis       Normal sinus rhythm  Poor R-wave Progression (consider lead placement or loss of anterior forces)  Confirmed by Julian Dhaliwal ([id number]) on 2/22/2022 5:20:04 PM     CBC WITH AUTOMATED DIFF    Collection Time: 02/22/22  3:18 PM Result Value Ref Range    WBC 9.0 3.6 - 11.0 K/uL    RBC 4.34 3.80 - 5.20 M/uL    HGB 12.6 11.5 - 16.0 g/dL    HCT 38.8 35.0 - 47.0 %    MCV 89.4 80.0 - 99.0 FL    MCH 29.0 26.0 - 34.0 PG    MCHC 32.5 30.0 - 36.5 g/dL    RDW 14.2 11.5 - 14.5 %    PLATELET 211 232 - 527 K/uL    MPV 10.7 8.9 - 12.9 FL    NRBC 0.0 0  WBC    ABSOLUTE NRBC 0.00 0.00 - 0.01 K/uL    NEUTROPHILS 51 32 - 75 %    LYMPHOCYTES 38 12 - 49 %    MONOCYTES 5 5 - 13 %    EOSINOPHILS 5 0 - 7 %    BASOPHILS 1 0 - 1 %    IMMATURE GRANULOCYTES 0 0.0 - 0.5 %    ABS. NEUTROPHILS 4.6 1.8 - 8.0 K/UL    ABS. LYMPHOCYTES 3.4 0.8 - 3.5 K/UL    ABS. MONOCYTES 0.5 0.0 - 1.0 K/UL    ABS. EOSINOPHILS 0.4 0.0 - 0.4 K/UL    ABS. BASOPHILS 0.1 0.0 - 0.1 K/UL    ABS. IMM. GRANS. 0.0 0.00 - 0.04 K/UL    DF AUTOMATED     METABOLIC PANEL, COMPREHENSIVE    Collection Time: 02/22/22  3:18 PM   Result Value Ref Range    Sodium 139 136 - 145 mmol/L    Potassium 4.2 3.5 - 5.1 mmol/L    Chloride 108 97 - 108 mmol/L    CO2 25 21 - 32 mmol/L    Anion gap 6 5 - 15 mmol/L    Glucose 237 (H) 65 - 100 mg/dL    BUN 13 6 - 20 MG/DL    Creatinine 0.81 0.55 - 1.02 MG/DL    BUN/Creatinine ratio 16 12 - 20      GFR est AA >60 >60 ml/min/1.73m2    GFR est non-AA >60 >60 ml/min/1.73m2    Calcium 8.9 8.5 - 10.1 MG/DL    Bilirubin, total 0.3 0.2 - 1.0 MG/DL    ALT (SGPT) 28 12 - 78 U/L    AST (SGOT) 10 (L) 15 - 37 U/L    Alk.  phosphatase 63 45 - 117 U/L    Protein, total 7.0 6.4 - 8.2 g/dL    Albumin 3.3 (L) 3.5 - 5.0 g/dL    Globulin 3.7 2.0 - 4.0 g/dL    A-G Ratio 0.9 (L) 1.1 - 2.2     NT-PRO BNP    Collection Time: 02/22/22  3:18 PM   Result Value Ref Range    NT pro- (H) <125 PG/ML   TROPONIN-HIGH SENSITIVITY    Collection Time: 02/22/22  3:18 PM   Result Value Ref Range    Troponin-High Sensitivity 187 (HH) 0 - 51 ng/L   TROPONIN-HIGH SENSITIVITY    Collection Time: 02/22/22  5:25 PM   Result Value Ref Range    Troponin-High Sensitivity 201 (HH) 0 - 51 ng/L Radiologic Studies -   CTA CHEST W OR W WO CONT   Final Result   1. No acute cardiopulmonary disease. No acute pulmonary embolus      2. 6 mm noncalcified pulmonary nodules right upper lobe      Guidelines by the Fleischner society (Radiology 2017 special report) suggest   that patients with low risk for lung cancer and solid nodule(s) less than or   equal to 6 mm in diameter require no follow-up. In patients with a higher risk,   such as smokers, follow-up noncontrast chest CT should be considered at 12   months. Patients with a known malignancy are at increased risk for metastasis   and should receive a three month follow-up. XR CHEST PORT   Final Result   Normal chest.         CT Results  (Last 48 hours)    None        CXR Results  (Last 48 hours)               02/22/22 1613  XR CHEST PORT Final result    Impression:  Normal chest.        Narrative:  EXAM: XR CHEST PORT       INDICATION: chest pain       COMPARISON: 8/13/2020       FINDINGS: A portable AP radiograph of the chest was obtained at 1612 hours. The   lungs are clear. The cardiac and mediastinal contours and pulmonary vascularity   are normal.  The bones and soft tissues are grossly within normal limits. Medical Decision Making   I am the first provider for this patient. I reviewed the vital signs, available nursing notes, past medical history, past surgical history, family history and social history. Vital Signs-Reviewed the patient's vital signs. Patient Vitals for the past 12 hrs:   Temp Pulse Resp BP SpO2   02/22/22 1507 98.1 °F (36.7 °C) 68 16 (!) 156/75 99 %       EKG interpretation: (Preliminary)  Rhythm: normal sinus rhythm; and regular . Rate (approx.): 68; Axis: normal; UT interval: normal; QRS interval: normal ; ST/T wave: non-specific changes;  Other findings: .    Records Reviewed: Nursing Notes, Old Medical Records, Previous electrocardiograms, Previous Radiology Studies and Previous Laboratory Studies    Provider Notes (Medical Decision Making):   ACS, atypical chest pain, reflux, gastritis, pulmonary embolism,    ED Course:   Initial assessment performed. The patients presenting problems have been discussed, and they are in agreement with the care plan formulated and outlined with them. I have encouraged them to ask questions as they arise throughout their visit. Consult note:    Spoke to Dr. Tato Obregon, cardiology. He recommends heparin and admission by the hospitalist.      Consult note: The patient is being admitted by the hospitalist, Dr. Miley Walden Time:   0    Disposition:  admit    DISCHARGE PLAN:  1. Current Discharge Medication List        2. Follow-up Information    None       3. Return to ED if worse     Diagnosis     Clinical Impression:   1. Acute chest pain    2. Pulmonary nodule        Attestations:    Beto Argueta MD    Please note that this dictation was completed with [employer], the computer voice recognition software. Quite often unanticipated grammatical, syntax, homophones, and other interpretive errors are inadvertently transcribed by the computer software. Please disregard these errors. Please excuse any errors that have escaped final proofreading. Thank you.

## 2022-02-22 NOTE — Clinical Note
TRANSFER - OUT REPORT:     Verbal report given to: Viji Damon RN. Report consisted of patient's Situation, Background, Assessment and   Recommendations(SBAR). Opportunity for questions and clarification was provided. Patient transported with a Registered Nurse. [facility]

## 2022-02-23 ENCOUNTER — APPOINTMENT (OUTPATIENT)
Dept: NON INVASIVE DIAGNOSTICS | Age: 62
DRG: 191 | End: 2022-02-23
Attending: INTERNAL MEDICINE
Payer: MEDICAID

## 2022-02-23 LAB
ANION GAP SERPL CALC-SCNC: 6 MMOL/L (ref 5–15)
APTT PPP: 27.1 SEC (ref 22.1–31)
APTT PPP: 29.1 SEC (ref 22.1–31)
APTT PPP: 31.3 SEC (ref 22.1–31)
APTT PPP: 34.3 SEC (ref 22.1–31)
BUN SERPL-MCNC: 14 MG/DL (ref 6–20)
BUN/CREAT SERPL: 20 (ref 12–20)
CALCIUM SERPL-MCNC: 9 MG/DL (ref 8.5–10.1)
CHLORIDE SERPL-SCNC: 110 MMOL/L (ref 97–108)
CO2 SERPL-SCNC: 24 MMOL/L (ref 21–32)
CREAT SERPL-MCNC: 0.71 MG/DL (ref 0.55–1.02)
ECHO AV AREA PEAK VELOCITY: 2.3 CM2
ECHO AV AREA PEAK VELOCITY: 2.4 CM2
ECHO AV AREA PEAK VELOCITY: 2.4 CM2
ECHO AV AREA VTI: 2.4 CM2
ECHO AV AREA VTI: 2.4 CM2
ECHO AV CUSP MM: 1.7 CM
ECHO AV MEAN GRADIENT: 5 MMHG
ECHO AV MEAN VELOCITY: 1 M/S
ECHO AV PEAK GRADIENT: 10 MMHG
ECHO AV PEAK GRADIENT: 10 MMHG
ECHO AV PEAK VELOCITY: 1.6 M/S
ECHO AV PEAK VELOCITY: 1.6 M/S
ECHO AV VTI: 33.4 CM
ECHO EST RA PRESSURE: 3 MMHG
ECHO LA DIAMETER INDEX: 1.87 CM/M2
ECHO LA DIAMETER: 4 CM
ECHO LA VOL 2C: 56 ML (ref 22–52)
ECHO LA VOL 4C: 62 ML (ref 22–52)
ECHO LA VOLUME AREA LENGTH: 71 ML
ECHO LA VOLUME INDEX A2C: 26 ML/M2 (ref 16–34)
ECHO LA VOLUME INDEX A4C: 29 ML/M2 (ref 16–34)
ECHO LA VOLUME INDEX AREA LENGTH: 33 ML/M2 (ref 16–34)
ECHO LV E' LATERAL VELOCITY: 9 CM/S
ECHO LV E' SEPTAL VELOCITY: 6 CM/S
ECHO LV FRACTIONAL SHORTENING: 25 % (ref 28–44)
ECHO LV INTERNAL DIMENSION DIASTOLE INDEX: 2.38 CM/M2
ECHO LV INTERNAL DIMENSION DIASTOLIC: 5.1 CM (ref 3.9–5.3)
ECHO LV INTERNAL DIMENSION SYSTOLIC INDEX: 1.78 CM/M2
ECHO LV INTERNAL DIMENSION SYSTOLIC: 3.8 CM
ECHO LV IVSD: 1.5 CM (ref 0.6–0.9)
ECHO LV IVSS: 1.8 CM
ECHO LV MASS 2D: 332.4 G (ref 67–162)
ECHO LV MASS INDEX 2D: 155.3 G/M2 (ref 43–95)
ECHO LV POSTERIOR WALL DIASTOLIC: 1.5 CM (ref 0.6–0.9)
ECHO LV POSTERIOR WALL SYSTOLIC: 1.8 CM
ECHO LV RELATIVE WALL THICKNESS RATIO: 0.59
ECHO LVOT AREA: 3.8 CM2
ECHO LVOT AV VTI INDEX: 0.65
ECHO LVOT DIAM: 2.2 CM
ECHO LVOT MEAN GRADIENT: 2 MMHG
ECHO LVOT PEAK GRADIENT: 4 MMHG
ECHO LVOT PEAK GRADIENT: 4 MMHG
ECHO LVOT PEAK VELOCITY: 1 M/S
ECHO LVOT PEAK VELOCITY: 1 M/S
ECHO LVOT STROKE VOLUME INDEX: 38.5 ML/M2
ECHO LVOT SV: 82.4 ML
ECHO LVOT VTI: 21.7 CM
ECHO MV A VELOCITY: 0.71 M/S
ECHO MV AREA PHT: 3.5 CM2
ECHO MV E DECELERATION TIME (DT): 250.6 MS
ECHO MV E VELOCITY: 0.58 M/S
ECHO MV E/A RATIO: 0.82
ECHO MV E/E' LATERAL: 6.44
ECHO MV E/E' RATIO (AVERAGED): 8.06
ECHO MV E/E' SEPTAL: 9.67
ECHO MV PRESSURE HALF TIME (PHT): 63.4 MS
ECHO PV MAX VELOCITY: 1 M/S
ECHO PV PEAK GRADIENT: 4 MMHG
ECHO RV FREE WALL PEAK S': 12 CM/S
ECHO RVOT PEAK GRADIENT: 3 MMHG
ECHO RVOT PEAK VELOCITY: 0.9 M/S
ERYTHROCYTE [DISTWIDTH] IN BLOOD BY AUTOMATED COUNT: 14.5 % (ref 11.5–14.5)
GLUCOSE BLD STRIP.AUTO-MCNC: 173 MG/DL (ref 65–117)
GLUCOSE BLD STRIP.AUTO-MCNC: 194 MG/DL (ref 65–117)
GLUCOSE BLD STRIP.AUTO-MCNC: 221 MG/DL (ref 65–117)
GLUCOSE BLD STRIP.AUTO-MCNC: 257 MG/DL (ref 65–117)
GLUCOSE SERPL-MCNC: 175 MG/DL (ref 65–100)
HCT VFR BLD AUTO: 37.3 % (ref 35–47)
HGB BLD-MCNC: 12.1 G/DL (ref 11.5–16)
MCH RBC QN AUTO: 28.9 PG (ref 26–34)
MCHC RBC AUTO-ENTMCNC: 32.4 G/DL (ref 30–36.5)
MCV RBC AUTO: 89.2 FL (ref 80–99)
NRBC # BLD: 0 K/UL (ref 0–0.01)
NRBC BLD-RTO: 0 PER 100 WBC
PLATELET # BLD AUTO: 249 K/UL (ref 150–400)
PMV BLD AUTO: 10.7 FL (ref 8.9–12.9)
POTASSIUM SERPL-SCNC: 3.4 MMOL/L (ref 3.5–5.1)
RBC # BLD AUTO: 4.18 M/UL (ref 3.8–5.2)
SERVICE CMNT-IMP: ABNORMAL
SODIUM SERPL-SCNC: 140 MMOL/L (ref 136–145)
THERAPEUTIC RANGE,PTTT: ABNORMAL SECS (ref 58–77)
THERAPEUTIC RANGE,PTTT: ABNORMAL SECS (ref 58–77)
THERAPEUTIC RANGE,PTTT: NORMAL SECS (ref 58–77)
THERAPEUTIC RANGE,PTTT: NORMAL SECS (ref 58–77)
WBC # BLD AUTO: 10 K/UL (ref 3.6–11)

## 2022-02-23 PROCEDURE — 74011250637 HC RX REV CODE- 250/637: Performed by: INTERNAL MEDICINE

## 2022-02-23 PROCEDURE — 74011636637 HC RX REV CODE- 636/637: Performed by: INTERNAL MEDICINE

## 2022-02-23 PROCEDURE — 74011250636 HC RX REV CODE- 250/636: Performed by: EMERGENCY MEDICINE

## 2022-02-23 PROCEDURE — 74011000250 HC RX REV CODE- 250: Performed by: INTERNAL MEDICINE

## 2022-02-23 PROCEDURE — 85730 THROMBOPLASTIN TIME PARTIAL: CPT

## 2022-02-23 PROCEDURE — 82962 GLUCOSE BLOOD TEST: CPT

## 2022-02-23 PROCEDURE — 65660000001 HC RM ICU INTERMED STEPDOWN

## 2022-02-23 PROCEDURE — 74011000258 HC RX REV CODE- 258: Performed by: EMERGENCY MEDICINE

## 2022-02-23 PROCEDURE — 96376 TX/PRO/DX INJ SAME DRUG ADON: CPT

## 2022-02-23 PROCEDURE — 36415 COLL VENOUS BLD VENIPUNCTURE: CPT

## 2022-02-23 PROCEDURE — 85027 COMPLETE CBC AUTOMATED: CPT

## 2022-02-23 PROCEDURE — 74011250637 HC RX REV CODE- 250/637: Performed by: STUDENT IN AN ORGANIZED HEALTH CARE EDUCATION/TRAINING PROGRAM

## 2022-02-23 PROCEDURE — 93306 TTE W/DOPPLER COMPLETE: CPT

## 2022-02-23 PROCEDURE — 80048 BASIC METABOLIC PNL TOTAL CA: CPT

## 2022-02-23 RX ORDER — METFORMIN HYDROCHLORIDE 500 MG/1
1000 TABLET, FILM COATED, EXTENDED RELEASE ORAL 2 TIMES DAILY
COMMUNITY

## 2022-02-23 RX ORDER — GLIPIZIDE 10 MG/1
10 TABLET, FILM COATED, EXTENDED RELEASE ORAL DAILY
COMMUNITY

## 2022-02-23 RX ADMIN — NITROGLYCERIN 1 INCH: 20 OINTMENT TOPICAL at 09:35

## 2022-02-23 RX ADMIN — AMLODIPINE BESYLATE 10 MG: 5 TABLET ORAL at 09:35

## 2022-02-23 RX ADMIN — HEPARIN SODIUM 4000 UNITS: 1000 INJECTION INTRAVENOUS; SUBCUTANEOUS at 12:10

## 2022-02-23 RX ADMIN — SPIRONOLACTONE 25 MG: 25 TABLET ORAL at 11:00

## 2022-02-23 RX ADMIN — Medication 5 UNITS: at 17:01

## 2022-02-23 RX ADMIN — TICAGRELOR 90 MG: 90 TABLET ORAL at 11:00

## 2022-02-23 RX ADMIN — LISINOPRIL 40 MG: 20 TABLET ORAL at 09:34

## 2022-02-23 RX ADMIN — SODIUM CHLORIDE, PRESERVATIVE FREE 10 ML: 5 INJECTION INTRAVENOUS at 21:40

## 2022-02-23 RX ADMIN — ASPIRIN 81 MG: 81 TABLET, COATED ORAL at 09:35

## 2022-02-23 RX ADMIN — NITROGLYCERIN 1 INCH: 20 OINTMENT TOPICAL at 18:06

## 2022-02-23 RX ADMIN — ATORVASTATIN CALCIUM 80 MG: 40 TABLET, FILM COATED ORAL at 21:39

## 2022-02-23 RX ADMIN — PANTOPRAZOLE SODIUM 40 MG: 40 TABLET, DELAYED RELEASE ORAL at 09:35

## 2022-02-23 RX ADMIN — POTASSIUM BICARBONATE 20 MEQ: 391 TABLET, EFFERVESCENT ORAL at 11:57

## 2022-02-23 RX ADMIN — HEPARIN SODIUM 17 UNITS/KG/HR: 1000 INJECTION INTRAVENOUS; SUBCUTANEOUS at 18:03

## 2022-02-23 RX ADMIN — TICAGRELOR 90 MG: 90 TABLET ORAL at 18:01

## 2022-02-23 RX ADMIN — HEPARIN SODIUM 4000 UNITS: 1000 INJECTION INTRAVENOUS; SUBCUTANEOUS at 03:17

## 2022-02-23 RX ADMIN — Medication 3 UNITS: at 11:57

## 2022-02-23 RX ADMIN — Medication 2 UNITS: at 09:51

## 2022-02-23 RX ADMIN — SODIUM CHLORIDE, PRESERVATIVE FREE 5 ML: 5 INJECTION INTRAVENOUS at 14:00

## 2022-02-23 RX ADMIN — SODIUM CHLORIDE, PRESERVATIVE FREE 10 ML: 5 INJECTION INTRAVENOUS at 04:34

## 2022-02-23 RX ADMIN — HEPARIN SODIUM 4000 UNITS: 1000 INJECTION INTRAVENOUS; SUBCUTANEOUS at 18:45

## 2022-02-23 NOTE — PROGRESS NOTES
2206: Bedside and Verbal shift change report given to Ben Baugh RN (oncoming nurse) by Casey Shah RN (offgoing nurse). Report included the following information SBAR, Kardex, ED Summary, Intake/Output, MAR, Accordion, Recent Results, Med Rec Status and Cardiac Rhythm NSR. Patient is currently on heparin gtt at this time     0303 02/23/2022: [facility] on system downtime. Called Lab and spoke to Bakari Resendez in regards to aPTT results. Patient's aPTT result is 27.1.     0309: Spoke to [address] [place] [address] Jade pharmacist in regards to aPTT results and was instructed to follow protocol as ordered. 9939: This RN gave heparin bolus as ordered and titrated drip up by 4 units/kg/hr. Rate is now at 13 units/kg/hr. 0725: Bedside and Verbal shift change report given to Addie Javier RN (oncoming nurse) by Ben Baugh RN (offgoing nurse). Report included the following information SBAR, ED Summary, Intake/Output, MAR, Accordion, Recent Results, Med Rec Status and Cardiac Rhythm NSR. Rate verification of heparin gtt completed at bedside. Acute changes documented above.

## 2022-02-23 NOTE — PROGRESS NOTES
Progress Note      2/23/2022 8:37 AM  NAME: Farhad Richardson   MRN:  [id number]   Admit Diagnosis: NSTEMI (non-ST elevated myocardial infarction) ([facility] [address] [I21.4]      Problem List:     1. NSTEMI  2. Coronary artery disease.  NSTEMI 3/20 w/ cath @ [facility].  RCA  attempt (wire would cross only); recurrent USA/NSETMI 5/20 w/ PCI of RCA w/ 2 JES and OM1 w/ 1 JES.  8/20 w/ JES to the RPLB. 3. Hypokalemia  4. Diabetes  5. Hypertension  6. EM on CPAPP  7. GERD  8. Hyperlipidemia  9. Tobacco abuse     Assessment/Plan: TnI 201    1. Can eat today  2. Will make NPO p MN and plan for cath tomorrow  3. I have recommended diagnostic cath for definitive evaluation of the patient's coronary anatomy and PCI if appropriate. All risks, benefits, and alternatives were discussed and the patient wishes to proceed. 4. Echo ordered  5. Continue heparin gtt  6. Continue norvasc  7. Continue ASA  8. Continue ticagrelor  9. Continue statin  10. Continue lisinopril  11. Continue NTGp prn  12. Continue aldactone  13. Hold ISMN whilst on NTGp         [x]       High complexity decision making was performed in this patient at high risk for decompensation with multiple organ involvement. Subjective: Farhad Richardson denies chest pain, dyspnea. Discussed with RN events overnight. Review of Systems:    Symptom Y/N Comments  Symptom Y/N Comments   Fever/Chills N   Chest Pain N    Poor Appetite N   Edema N    Cough N   Abdominal Pain N    Sputum N   Joint Pain N    SOB/ALVAREZ N   Pruritis/Rash N    Nausea/vomit N   Tolerating PT/OT Y    Diarrhea N   Tolerating Diet Y    Constipation N   Other       Could NOT obtain due to:      Objective:      Physical Exam:    Last 24hrs VS reviewed since prior progress note.  Most recent are:    Visit Vitals  BP (!) 118/43 (BP 1 Location: Right upper arm, BP Patient Position: At rest)   Pulse (!) 59   Temp 98.8 °F (37.1 °C)   Resp 20   Ht 5' 5\" (1.651 m)   Wt 108.9 kg (240 lb 1.3 oz)   LMP  (LMP Unknown)   SpO2 97%   BMI 39.95 kg/m²       Intake/Output Summary (Last 24 hours) at 2/23/2022 1768  Last data filed at 2/23/2022 0400  Gross per 24 hour   Intake 67.34 ml   Output    Net 67.34 ml        General Appearance: Well developed, well nourished, alert & oriented x 3,    no acute distress. Ears/Nose/Mouth/Throat: Hearing grossly normal.  Neck: Supple. Chest: Lungs clear to auscultation bilaterally. Cardiovascular: Regular rate and rhythm, S1S2 normal, no murmur. Abdomen: Soft, non-tender, bowel sounds are active. Extremities: No edema bilaterally. Skin: Warm and dry. []         Post-cath site without hematoma, bruit, tenderness, or thrill. Distal pulses intact. PMH/SH reviewed - no change compared to H&P    Data Review    Telemetry: sinus rhythm     EKG:   [x]  No new EKG for review    Lab Data Personally Reviewed:    Recent Labs     02/23/22  0200 02/22/22 1913   WBC 10.0 10.3   HGB 12.1 12.8   HCT 37.3 38.7    268     Recent Labs     02/23/22  0200 02/22/22 1913   APTT 27.1 24.5      Recent Labs     02/23/22  0200 02/22/22  1518    139   K 3.4* 4.2   * 108   CO2 24 25   BUN 14 13   CREA 0.71 0.81   * 237*   CA 9.0 8.9     No results for input(s): CPK, CKNDX, TROIQ in the last 72 hours. No lab exists for component: CPKMB  Lab Results   Component Value Date/Time    Cholesterol, total 102 08/14/2020 06:26 AM    HDL Cholesterol 37 08/14/2020 06:26 AM    LDL, calculated 46.6 08/14/2020 06:26 AM    Triglyceride 92 08/14/2020 06:26 AM    CHOL/HDL Ratio 2.8 08/14/2020 06:26 AM       Recent Labs     02/22/22  1518   AP 63   TP 7.0   ALB 3.3*   GLOB 3.7     No results for input(s): PH, PCO2, PO2 in the last 72 hours.     Medications Personally Reviewed:    Current Facility-Administered Medications   Medication Dose Route Frequency    heparin 25,000 units in D5W 250 ml infusion  9-25 Units/kg/hr IntraVENous TITRATE    heparin (porcine) 1,000 unit/mL injection 2,000 Units 2,000 Units IntraVENous PRN    Or    heparin (porcine) 1,000 unit/mL injection 4,000 Units  4,000 Units IntraVENous PRN    nitroglycerin (NITROBID) 2 % ointment 0.5 Inch  0.5 Inch Topical BID    sodium chloride (NS) flush 5-40 mL  5-40 mL IntraVENous Q8H    sodium chloride (NS) flush 5-40 mL  5-40 mL IntraVENous PRN    acetaminophen (TYLENOL) tablet 650 mg  650 mg Oral Q6H PRN    Or    acetaminophen (TYLENOL) suppository 650 mg  650 mg Rectal Q6H PRN    polyethylene glycol (MIRALAX) packet 17 g  17 g Oral DAILY PRN    ondansetron (ZOFRAN ODT) tablet 4 mg  4 mg Oral Q8H PRN    Or    ondansetron (ZOFRAN) injection 4 mg  4 mg IntraVENous Q6H PRN    glucose chewable tablet 16 g  4 Tablet Oral PRN    dextrose 10% infusion 0-250 mL  0-250 mL IntraVENous PRN    glucagon (GLUCAGEN) injection 1 mg  1 mg IntraMUSCular PRN    insulin lispro (HUMALOG) injection   SubCUTAneous AC&HS    lisinopriL (PRINIVIL, ZESTRIL) tablet 40 mg  40 mg Oral DAILY    atorvastatin (LIPITOR) tablet 80 mg  80 mg Oral QHS    aspirin delayed-release tablet 81 mg  81 mg Oral DAILY    ticagrelor (BRILINTA) tablet 90 mg  90 mg Oral BID    spironolactone (ALDACTONE) tablet 25 mg  25 mg Oral DAILY    pantoprazole (PROTONIX) tablet 40 mg  40 mg Oral DAILY    cyclobenzaprine (FLEXERIL) tablet 5 mg  5 mg Oral TID PRN    isosorbide mononitrate ER (IMDUR) tablet 30 mg  30 mg Oral DAILY    amLODIPine (NORVASC) tablet 10 mg  10 mg Oral DAILY     Current Outpatient Medications   Medication Sig    cyclobenzaprine (FLEXERIL) 10 mg tablet Take 0.5 Tabs by mouth three (3) times daily as needed for Muscle Spasm(s).  ISOSORBIDE MONONITRATE PO Take 30 mg by mouth daily.  ticagrelor (BRILINTA) 90 mg tablet Take 90 mg by mouth two (2) times a day.  atorvastatin (LIPITOR) 80 mg tablet Take 80 mg by mouth nightly.  metFORMIN ER (GLUCOPHAGE XR) 750 mg tablet Take 750 mg by mouth two (2) times a day.     pantoprazole (PROTONIX) 40 mg tablet Take 40 mg by mouth daily.  amLODIPine (NORVASC) 10 mg tablet Take 10 mg by mouth daily.  aspirin delayed-release 81 mg tablet Take  by mouth daily.  lisinopriL (PRINIVIL, ZESTRIL) 40 mg tablet Take 40 mg by mouth daily.  spironolactone (ALDACTONE) 25 mg tablet Take 25 mg by mouth daily.  metoprolol succinate (TOPROL-XL) 100 mg tablet Take 100 mg by mouth two (2) times a day.  glipiZIDE (GLUCOTROL) 5 mg tablet Take 5 mg by mouth daily.          Vishnu Curry III, DO

## 2022-02-23 NOTE — PROGRESS NOTES
Transition of Care Plan:    RUR: 6% low risk for readmission  Disposition: Home with spouse  Follow up appointments: PCP, Cardiology? DME needed: Pt owns a CPAP machine (at bedside)  Transportation at Discharge: Pt's spouse  Keys or means to access home: Pt has access       IM Medicare Letter: N/A; Medicaid coverage  Is patient a BCPI-A Bundle: N/A          If yes, was Bundle Letter given?:    Is patient a  and connected with the [place]? N/A              If yes, was [place] transfer form completed and [facility] notified? Caregiver Contact: Pt's spouse, José Luis sanchez [phone]  Discharge Caregiver contacted prior to discharge? Care Conference needed?:                     Reason for Admission:  NSTEMI                     RUR Score: 6% low risk for readmission                    Plan for utilizing home health: No home health needs identified at baseline         PCP: First and Last name:  Mandie Redman MD     Name of Practice: [facility]   Are you a current patient: Yes/No: Yes   Approximate date of last visit: Approximately June 2021; sees 2x/year   Can you participate in a virtual visit with your PCP: Yes                    Current Advanced Directive/Advance Care Plan: Full Code   Nate 13 (ACP) Conversation      Date of Conversation: 2/23/2022  Conducted with: Patient with Decision Making Capacity    Healthcare Decision Maker: Spouse is legal next of kin, no ACP documents on chart. Primary Decision MakerRoderic Gottron - [phone]    Secondary Decision Maker: [place] Velma Chula  - [phone]  Click here to complete [place] including selection of the Healthcare Decision Maker Relationship (ie \"Primary\")      Today we documented Decision Maker(s) consistent with Legal Next of Kin hierarchy.     Content/Action Overview:   DECLINED ACP conversation - will revisit periodically   Reviewed DNR/DNI and patient elects Full Code (Attempt Resuscitation)    Length of Voluntary ACP Conversation in minutes:  <16 minutes (Non-Billable)    Megan Ramirez [address] Decision Maker:       Primary Decision Maker: Husam Pretty. - [phone]    Secondary Decision Maker: Bridger Arzate - Sister - [phone]                  Transition of Care Plan:  Home with family, outpatient follow up appointments    CM reviewed chart. CM completed assessment with pt and pt's spouse at bedside. CM introduced self/role, verified demographics, and discussed discharge planning. Pt resides with spouse, reports being eager to return home. Pt's spouse will transport at d/c. Pt has her personal CPAP machine at bedside, denies owning/using other DME at home. Pt is independent at baseline to include driving, anticipates driving herself to outpatient follow up appts. Pharmacy preference is [address] [place] [address],[address] Denies concerns with transition of care plans. Pt follows with PCP, Dr. Dillan Scott, and cardiologist Dr. Eduardo Julian. Unit care management will continue to follow for transition of care planning needs. Care Management Interventions  PCP Verified by CM: Yes  Palliative Care Criteria Met (RRAT>21 & CHF Dx)?: No  Mode of Transport at Discharge:  Other (see comment) (Spouse)  Transition of Care Consult (CM Consult): Discharge Planning  Discharge Durable Medical Equipment: No (Pt owns a CPAP machine (at bedside))  Physical Therapy Consult: No  Occupational Therapy Consult: No  Speech Therapy Consult: No  Support Systems: Spouse/Significant Other,Other Family Member(s)  Confirm Follow Up Transport: Self (Self vs family)  Discharge Location  Patient Expects to be Discharged to[de-identified] Home with outpatient services (Home with outpatient follow up)    Jelly Vasquez [facility], [address]

## 2022-02-23 NOTE — H&P
Hospitalist Admission Note    NAME: Parth Valentin   :  1960   MRN:  [id number]     Date/Time:  2022 9:21 PM    Patient PCP: Tenzin Sotelo MD  ______________________________________________________________________  Given the patient's current clinical presentation, I have a high level of concern for decompensation if discharged from the emergency department. Complex decision making was performed, which includes reviewing the patient's available past medical records, laboratory results, and x-ray films. My assessment of this patient's clinical condition and my plan of care is as follows. Assessment / Plan:  NSTEMI  Admit patient to IVCU  Follow-up troponin  Cardiology consultationstart patient on heparin  Keep patient n.p.o. for possible cardiac cath tomorrow  Continue aspirin and Brilinta    DMhold oral hypoglycemic agentstart patient sliding scale    Hyperlipidemiacontinue Lipitor    Incidental finding 6 mm pulmonary nodule  Referral to pulmonary as outpatient    Hypertensioncontinue home antihypertensive medication    Code Status: Full   Surrogate Decision Maker:    DVT Prophylaxis: Heparin  GI Prophylaxis: not indicated          Subjective:   CHIEF COMPLAINT: Chest pain    HISTORY OF PRESENT ILLNESS:     65years old female from home with past medical history significant for GERD, hypertension, coronary artery disease, DM was referred by cardiology for evaluation of worsening chest heaviness that started about 2 to 3 weeks, denies any shortness of breath denies any productive cough denies any fever and chills denies any dizziness, patient also complained from hand numbness, patient denies any weakness, CTA chest was done show no PE EKG was done show normal sinus rhythm, troponin was checked was found to be elevated. We were asked to admit for work up and evaluation of the above problems.      Past Medical History:   Diagnosis Date    Calculus of kidney     Difficult intubation     per patient she was given 2 papers in regards to this, asked her to bring DOS.  DM (diabetes mellitus) ([facility]     GERD (gastroesophageal reflux disease)     Headache     Hypertension     NSTEMI (non-ST elevated myocardial infarction) ([facility] 5/17/2020    Sleep apnea     no cpap- severe EM per patient        Past Surgical History:   Procedure Laterality Date    HX GYN      ovary removed    HX HEENT Bilateral 2016    BMT's    HX OTHER SURGICAL  2010    KIDNEY STONES REMOVED       Social History     Tobacco Use    Smoking status: Current Every Day Smoker     Packs/day: 0.25    Smokeless tobacco: Never Used   Substance Use Topics    Alcohol use: No         family history on file. DM   No Known Allergies     Prior to Admission medications    Medication Sig Start Date End Date Taking? Authorizing Provider   cyclobenzaprine (FLEXERIL) 10 mg tablet Take 0.5 Tabs by mouth three (3) times daily as needed for Muscle Spasm(s). 8/15/20   Les Rodríguez MD   ISOSORBIDE MONONITRATE PO Take 30 mg by mouth daily. Yovani Hope MD   ticagrelor (BRILINTA) 90 mg tablet Take 90 mg by mouth two (2) times a day. Yovani Hope MD   atorvastatin (LIPITOR) 80 mg tablet Take 80 mg by mouth nightly. Yovani Hope MD   metFORMIN ER (GLUCOPHAGE XR) 750 mg tablet Take 750 mg by mouth two (2) times a day. Yovani Hope MD   pantoprazole (PROTONIX) 40 mg tablet Take 40 mg by mouth daily. Yovani Hope MD   amLODIPine (NORVASC) 10 mg tablet Take 10 mg by mouth daily. Yovani Hope MD   aspirin delayed-release 81 mg tablet Take  by mouth daily. Yovani Hope MD   lisinopriL (PRINIVIL, ZESTRIL) 40 mg tablet Take 40 mg by mouth daily. Yovani Hope MD   spironolactone (ALDACTONE) 25 mg tablet Take 25 mg by mouth daily. Yovani Hope MD   metoprolol succinate (TOPROL-XL) 100 mg tablet Take 100 mg by mouth two (2) times a day.     Yovani Hope MD   glipiZIDE (GLUCOTROL) 5 mg tablet Take 5 mg by mouth daily. Provider, Historical       REVIEW OF SYSTEMS:     I am not able to complete the review of systems because: The patient is intubated and sedated    The patient has altered mental status due to his acute medical problems    The patient has baseline aphasia from prior stroke(s)    The patient has baseline dementia and is not reliable historian    The patient is in acute medical distress and unable to provide information           Total of 12 systems reviewed as follows:       POSITIVE= underlined text  Negative = text not underlined  General:  fever, chills, sweats, generalized weakness, weight loss/gain,      loss of appetite   Eyes:    blurred vision, eye pain, loss of vision, double vision  ENT:    rhinorrhea, pharyngitis   Respiratory:   cough, sputum production, SOB, ALVAREZ, wheezing, pleuritic pain   Cardiology:   chest pain, palpitations, orthopnea, PND, edema, syncope   Gastrointestinal:  abdominal pain , N/V, diarrhea, dysphagia, constipation, bleeding   Genitourinary:  frequency, urgency, dysuria, hematuria, incontinence   Muskuloskeletal :  arthralgia, myalgia, back pain  Hematology:  easy bruising, nose or gum bleeding, lymphadenopathy   Dermatological: rash, ulceration, pruritis, color change / jaundice  Endocrine:   hot flashes or polydipsia   Neurological:  headache, dizziness, confusion, focal weakness, paresthesia,     Speech difficulties, memory loss, gait difficulty  Psychological: Feelings of anxiety, depression, agitation    Objective:   VITALS:    Visit Vitals  BP (!) 156/75 (BP 1 Location: Left arm, BP Patient Position: At rest)   Pulse 68   Temp 98.1 °F (36.7 °C)   Resp 16   Ht 5' 5\" (1.651 m)   Wt 108.9 kg (240 lb 1.3 oz)   SpO2 99%   BMI 39.95 kg/m²       PHYSICAL EXAM:    General:    Alert, cooperative, no distress, appears stated age.      HEENT: Atraumatic, anicteric sclerae, pink conjunctivae     No oral ulcers, mucosa moist, throat clear, dentition fair  Neck:  Supple, symmetrical,  thyroid: non tender  Lungs:   Clear to auscultation bilaterally. No Wheezing or Rhonchi. No rales. Chest wall:  No tenderness  No Accessory muscle use. Heart:   Regular  rhythm,  No  murmur   No edema  Abdomen:   Soft, non-tender. Not distended. Bowel sounds normal  Extremities: No cyanosis. No clubbing,      Skin turgor normal, Capillary refill normal, Radial dial pulse 2+  Skin:     Not pale. Not Jaundiced  No rashes   Psych:  Good insight. Not depressed. Not anxious or agitated. Neurologic: EOMs intact. No facial asymmetry. No aphasia or slurred speech. Symmetrical strength, Sensation grossly intact. Alert and oriented X 4.     _______________________________________________________________________  Care Plan discussed with:    Comments   Patient y    Family      RN y    Care Manager                    Consultant:      _______________________________________________________________________  Expected  Disposition:   Home with Family y   HH/PT/OT/RN    SNF/LTC    HAYDEE    ________________________________________________________________________  TOTAL TIME:  54 Minutes    Critical Care Provided     Minutes non procedure based      Comments    y Reviewed previous records   >50% of visit spent in counseling and coordination of care y Discussion with patient and/or family and questions answered       ________________________________________________________________________  Signed: Will Delude, MD    Procedures: see electronic medical records for all procedures/Xrays and details which were not copied into this note but were reviewed prior to creation of Plan.     LAB DATA REVIEWED:    Recent Results (from the past 24 hour(s))   EKG, 12 LEAD, INITIAL    Collection Time: 02/22/22  3:10 PM   Result Value Ref Range    Ventricular Rate 68 BPM    Atrial Rate 68 BPM    P-R Interval 160 ms    QRS Duration 86 ms    Q-T Interval 408 ms    QTC Calculation (Bezet) 433 ms    Calculated P Axis 64 degrees Calculated R Axis -6 degrees    Calculated T Axis 11 degrees    Diagnosis       Normal sinus rhythm  Poor R-wave Progression (consider lead placement or loss of anterior forces)  Confirmed by Hugh Wetzel ([id number]) on 2/22/2022 5:20:04 PM     CBC WITH AUTOMATED DIFF    Collection Time: 02/22/22  3:18 PM   Result Value Ref Range    WBC 9.0 3.6 - 11.0 K/uL    RBC 4.34 3.80 - 5.20 M/uL    HGB 12.6 11.5 - 16.0 g/dL    HCT 38.8 35.0 - 47.0 %    MCV 89.4 80.0 - 99.0 FL    MCH 29.0 26.0 - 34.0 PG    MCHC 32.5 30.0 - 36.5 g/dL    RDW 14.2 11.5 - 14.5 %    PLATELET 968 109 - 752 K/uL    MPV 10.7 8.9 - 12.9 FL    NRBC 0.0 0  WBC    ABSOLUTE NRBC 0.00 0.00 - 0.01 K/uL    NEUTROPHILS 51 32 - 75 %    LYMPHOCYTES 38 12 - 49 %    MONOCYTES 5 5 - 13 %    EOSINOPHILS 5 0 - 7 %    BASOPHILS 1 0 - 1 %    IMMATURE GRANULOCYTES 0 0.0 - 0.5 %    ABS. NEUTROPHILS 4.6 1.8 - 8.0 K/UL    ABS. LYMPHOCYTES 3.4 0.8 - 3.5 K/UL    ABS. MONOCYTES 0.5 0.0 - 1.0 K/UL    ABS. EOSINOPHILS 0.4 0.0 - 0.4 K/UL    ABS. BASOPHILS 0.1 0.0 - 0.1 K/UL    ABS. IMM. GRANS. 0.0 0.00 - 0.04 K/UL    DF AUTOMATED     METABOLIC PANEL, COMPREHENSIVE    Collection Time: 02/22/22  3:18 PM   Result Value Ref Range    Sodium 139 136 - 145 mmol/L    Potassium 4.2 3.5 - 5.1 mmol/L    Chloride 108 97 - 108 mmol/L    CO2 25 21 - 32 mmol/L    Anion gap 6 5 - 15 mmol/L    Glucose 237 (H) 65 - 100 mg/dL    BUN 13 6 - 20 MG/DL    Creatinine 0.81 0.55 - 1.02 MG/DL    BUN/Creatinine ratio 16 12 - 20      GFR est AA >60 >60 ml/min/1.73m2    GFR est non-AA >60 >60 ml/min/1.73m2    Calcium 8.9 8.5 - 10.1 MG/DL    Bilirubin, total 0.3 0.2 - 1.0 MG/DL    ALT (SGPT) 28 12 - 78 U/L    AST (SGOT) 10 (L) 15 - 37 U/L    Alk.  phosphatase 63 45 - 117 U/L    Protein, total 7.0 6.4 - 8.2 g/dL    Albumin 3.3 (L) 3.5 - 5.0 g/dL    Globulin 3.7 2.0 - 4.0 g/dL    A-G Ratio 0.9 (L) 1.1 - 2.2     NT-PRO BNP    Collection Time: 02/22/22  3:18 PM   Result Value Ref Range    NT pro- (H) <125 PG/ML   TROPONIN-HIGH SENSITIVITY    Collection Time: 02/22/22  3:18 PM   Result Value Ref Range    Troponin-High Sensitivity 187 (HH) 0 - 51 ng/L   TROPONIN-HIGH SENSITIVITY    Collection Time: 02/22/22  5:25 PM   Result Value Ref Range    Troponin-High Sensitivity 201 (HH) 0 - 51 ng/L   PTT    Collection Time: 02/22/22  7:13 PM   Result Value Ref Range    aPTT 24.5 22.1 - 31.0 sec    aPTT, therapeutic range     58.0 - 77.0 SECS   CBC WITH AUTOMATED DIFF    Collection Time: 02/22/22  7:13 PM   Result Value Ref Range    WBC 10.3 3.6 - 11.0 K/uL    RBC 4.35 3.80 - 5.20 M/uL    HGB 12.8 11.5 - 16.0 g/dL    HCT 38.7 35.0 - 47.0 %    MCV 89.0 80.0 - 99.0 FL    MCH 29.4 26.0 - 34.0 PG    MCHC 33.1 30.0 - 36.5 g/dL    RDW 14.2 11.5 - 14.5 %    PLATELET 274 163 - 492 K/uL    MPV 10.6 8.9 - 12.9 FL    NRBC 0.0 0  WBC    ABSOLUTE NRBC 0.00 0.00 - 0.01 K/uL    NEUTROPHILS 46 32 - 75 %    LYMPHOCYTES 42 12 - 49 %    MONOCYTES 5 5 - 13 %    EOSINOPHILS 6 0 - 7 %    BASOPHILS 1 0 - 1 %    IMMATURE GRANULOCYTES 0 0.0 - 0.5 %    ABS. NEUTROPHILS 4.8 1.8 - 8.0 K/UL    ABS. LYMPHOCYTES 4.3 (H) 0.8 - 3.5 K/UL    ABS. MONOCYTES 0.5 0.0 - 1.0 K/UL    ABS. EOSINOPHILS 0.6 (H) 0.0 - 0.4 K/UL    ABS. BASOPHILS 0.1 0.0 - 0.1 K/UL    ABS. IMM.  GRANS. 0.0 0.00 - 0.04 K/UL    DF AUTOMATED

## 2022-02-23 NOTE — PROGRESS NOTES
Pharmacy Medication Reconciliation     The patient was not interviewed but the dispense report from her pharmacy was compared to her PTA list and updated. Allergy Update: Patient has no known allergies. Recommendations/Findings: The following amendments were made to the patient's active medication list on file at [facility]:   1) Additions:  - None     2) Deletions:   - Cyclobenzaprine 10 mg     3) Changes:   - Amlodipine 10 mg changed to 5 mg   - Glipizide 5 mg ER changed to 10 mg   - Metformin 750 ER changed to 500 mg ER 2 tablets BID   - Metoprolol succinate 100 mg changed from BID to daily     Pertinent Findings: None    Clarified PTA med list with Jenny. PTA medication list was corrected to the following:     Prior to Admission Medications   Prescriptions Last Dose Informant Taking? ISOSORBIDE MONONITRATE PO  Other Yes   Sig: Take 30 mg by mouth daily. amLODIPine (NORVASC) 10 mg tablet  Other Yes   Sig: Take 5 mg by mouth daily. aspirin delayed-release 81 mg tablet  Other No   Sig: Take  by mouth daily. atorvastatin (LIPITOR) 80 mg tablet  Other Yes   Sig: Take 80 mg by mouth nightly. glipiZIDE SR (GLUCOTROL XL) 10 mg CR tablet  Other Yes   Sig: Take 10 mg by mouth daily. lisinopriL (PRINIVIL, ZESTRIL) 40 mg tablet  Other Yes   Sig: Take 40 mg by mouth daily. metFORMIN (GLUMETZA ER) 500 mg TG24 24 hour tablet  Other Yes   Sig: Take 1,000 mg by mouth two (2) times a day. metoprolol succinate (TOPROL-XL) 100 mg tablet  Other Yes   Sig: Take 100 mg by mouth daily. pantoprazole (PROTONIX) 40 mg tablet  Other No   Sig: Take 40 mg by mouth daily. spironolactone (ALDACTONE) 25 mg tablet  Other Yes   Sig: Take 25 mg by mouth daily. ticagrelor (BRILINTA) 90 mg tablet  Other Yes   Sig: Take 90 mg by mouth two (2) times a day.       Facility-Administered Medications: None        Thank you,  Mira Keys, PHARMD

## 2022-02-23 NOTE — PROGRESS NOTES
1530: TRANSFER - IN REPORT:    Verbal report received from [address], [facility] (name) on Mayito Leal  being received from ED (unit) for routine progression of care      Report consisted of patients Situation, Background, Assessment and   Recommendations(SBAR). Information from the following report(s) SBAR, Kardex and ED Summary was reviewed with the receiving nurse. Opportunity for questions and clarification was provided. Assessment completed upon patients arrival to unit and care assumed.

## 2022-02-23 NOTE — PROGRESS NOTES
TRANSFER - OUT REPORT:    Verbal report given to Francois Baird RN(name) on Chelsea Abraham  being transferred to [facility] (unit) for routine progression of care       Report consisted of patients Situation, Background, Assessment and   Recommendations(SBAR). Information from the following report(s) SBAR, Kardex, ED Summary, MAR, Med Rec Status and Cardiac Rhythm NSR was reviewed with the receiving nurse. Lines:   Peripheral IV 02/22/22 Left Antecubital (Active)   Site Assessment Clean, dry, & intact 02/23/22 0400   Phlebitis Assessment 0 02/23/22 0400   Infiltration Assessment 0 02/23/22 0400   Dressing Status Clean, dry, & intact 02/23/22 0400   Dressing Type Transparent 02/23/22 0400   Hub Color/Line Status Pink; Infusing;Flushed;Patent 02/23/22 0400   Action Taken Open ports on tubing capped 02/23/22 0400        Opportunity for questions and clarification was provided. Patient transported with:   Patient's medications from home     Heparin Drip running at 17 units and 18.5 mL.

## 2022-02-23 NOTE — CONSULTS
IP Cardiology Consult       Date of consult:  02/22/22  Date of admission: 2/22/2022  Primary Cardiologist: Dr Sheila Tobias   Physician Requesting consult: Dr Michael Silvestre:    Problem list:   1. Chest tightness, UA/ NSTEMI    2. H/o CAD  of RCA, cath in 5/2020 for NSTEMI PCI of RCA  and PCI of OM, 8/2020 NSTEMI PCI of PLB branch   3. HTN  4. HLP  5. EM  6. GERD   7. Smoking        1. CAD -  of RCA, NSTEMI 3/2020 with attempt of RCA PCI, only wire would cross. Also 80% OM2 lesion. NSTEMI 5/2020 with 2V CAD. Underwent PTCA/PCI of RCA  with JES x2 and PTCA/PCI of OM2 with JES. NSTEMI 8/2020 s/p successful PTCA and PCI of the RPLB with a JES. 2. HTN  3. HLD  4. EM - CPAP  5. GERD  6. Tobacco use; Q 6/20  7. Arthritis. Reports numbness only of one-two fingers and has chest tightness, could represent angina, she is poor historian, does not remember previous symptoms, troponin mildly elevated and trending up, will treat for UA/NSTEMI for now. CT negative for PE. Recommendations:    1. Cont aspirin and brilinta   2. On Hep gtt while trending CE   3. Cont statin   4. Started on nitro paste      5. Cont metoprolol, isosorbide, amlodipine   6. On lisinopril and spironolactone   7. DM mx per primary team, Hold metformin    8. Will discuss with patient's cardiologist, Dr Sheila Tobias, for ischemic eval, likely cath, may be tomorrow or Thursday, Keep NPO post midnight     Thank you for this consult and allowing me to take part in this patients care. Please call with questions.             [x]        High complexity decision making was performed      CC / Reason for consult:  CP    History of the presenting illness:  Tricia Benson is a 65 y.o. female with PMH of CAD and PCI   Very poor historian   She reports having sharp pain since she has PCI, that usually last for seconds and goes away   Also Feels chest tightness intermittently, lasting for very short period but thinks lately over past few days it is worse, feels tight very frequently. No SOB, no diaphoresis. Also feels one finger in right hand is numb and difficult to do things with that finger since she woke up. She also reports chest tightness when came to ER. Trop minimally elevated  EKG was okay   Last cath in 2020 reviewed when she had PCI of PLB. Reports to be compliant with medications. Past Medical History:   Diagnosis Date    Calculus of kidney     Difficult intubation     per patient she was given 2 papers in regards to this, asked her to bring DOS.  DM (diabetes mellitus) ([facility] [address]     GERD (gastroesophageal reflux disease)     Headache     Hypertension     NSTEMI (non-ST elevated myocardial infarction) ([facility] [address] 5/17/2020    Sleep apnea     no cpap- severe EM per patient       Prior to Admission medications    Medication Sig Start Date End Date Taking? Authorizing Provider   cyclobenzaprine (FLEXERIL) 10 mg tablet Take 0.5 Tabs by mouth three (3) times daily as needed for Muscle Spasm(s). 8/15/20   Tang Tate MD   ISOSORBIDE MONONITRATE PO Take 30 mg by mouth daily. Yovani Hope MD   ticagrelor (BRILINTA) 90 mg tablet Take 90 mg by mouth two (2) times a day. Yovani Hope MD   atorvastatin (LIPITOR) 80 mg tablet Take 80 mg by mouth nightly. Yovani Hope MD   metFORMIN ER (GLUCOPHAGE XR) 750 mg tablet Take 750 mg by mouth two (2) times a day. Yovani Hope MD   pantoprazole (PROTONIX) 40 mg tablet Take 40 mg by mouth daily. Yovani Hope MD   amLODIPine (NORVASC) 10 mg tablet Take 10 mg by mouth daily. Yovani Hope MD   aspirin delayed-release 81 mg tablet Take  by mouth daily. Yovani Hope MD   lisinopriL (PRINIVIL, ZESTRIL) 40 mg tablet Take 40 mg by mouth daily. Yovani Hope MD   spironolactone (ALDACTONE) 25 mg tablet Take 25 mg by mouth daily. Yovani Hope MD   metoprolol succinate (TOPROL-XL) 100 mg tablet Take 100 mg by mouth two (2) times a day.     Yovani Hope MD   glipiZIDE (GLUCOTROL) 5 mg tablet Take 5 mg by mouth daily. Provider, Historical       Family History  (Detailed)  Relationship Family Member Name  Age at Death Condition Onset Age Cause of Death   Father  Y  Stroke  N   Father  Y  Sudden death  N   Father  Y  Hypertension  N   Mother  Y  Coronary artery disease 68 N   Mother    Myocardial infarction 68 N   Mother  Y  Stroke 68 N   Mother  Y  Hypertension 68 N   Mother  Y  Congestive heart failure 68 N   Mother    Diabetes mellitus 68 N       Social History     Socioeconomic History    Marital status:      Spouse name: Not on file    Number of children: Not on file    Years of education: Not on file    Highest education level: Not on file   Occupational History    Not on file   Tobacco Use    Smoking status: Current Every Day Smoker     Packs/day: 0.25    Smokeless tobacco: Never Used   Substance and Sexual Activity    Alcohol use: No    Drug use: No    Sexual activity: Not on file   Other Topics Concern    Not on file   Social History Narrative    Not on file     Social Determinants of Health     Financial Resource Strain:     Difficulty of Paying Living Expenses: Not on file   Food Insecurity:     Worried About Running Out of Food in the Last Year: Not on file    Olivia of Food in the Last Year: Not on file   Transportation Needs:     Lack of Transportation (Medical): Not on file    Lack of Transportation (Non-Medical):  Not on file   Physical Activity:     Days of Exercise per Week: Not on file    Minutes of Exercise per Session: Not on file   Stress:     Feeling of Stress : Not on file   Social Connections:     Frequency of Communication with Friends and Family: Not on file    Frequency of Social Gatherings with Friends and Family: Not on file    Attends [employer] Services: Not on file    Active Member of Clubs or Organizations: Not on file    Attends Club or Organization Meetings: Not on file    Marital Status: Not on file Intimate Partner Violence:     Fear of Current or Ex-Partner: Not on file    Emotionally Abused: Not on file    Physically Abused: Not on file    Sexually Abused: Not on file   Housing Stability:     Unable to Pay for Housing in the Last Year: Not on file    Number of Ariel in the Last Year: Not on file    Unstable Housing in the Last Year: Not on file         ROS      Total of 12 systems reviewed, all systems review was negative except Pertinent Positives included in HPI     Visit Vitals  BP (!) 156/75 (BP 1 Location: Left arm, BP Patient Position: At rest)   Pulse 68   Temp 98.1 °F (36.7 °C)   Resp 16   Ht 5' 5\" (1.651 m)   Wt 108.9 kg (240 lb 1.3 oz)   LMP  (LMP Unknown)   SpO2 99%   BMI 39.95 kg/m²       Physical Exam  Examination:     General: Alert + Oriented x3, no acute distress   HEENT: Normocephalic aromatic, MMM   Neck: Supple, JVP- not well appreciated   RS: Non labored, clear   CVS: Regular rate and rhythm, S1S2, no murmur   Abd: Soft, non tender, non distended   Lower extremity: Warm to touch, Edema- None   Skin: Warm and dry, No significant bruises or rash   CNS: Oriented x3, no focal neuro deficit     Lab review:  BMP:   Lab Results   Component Value Date/Time     02/22/2022 03:18 PM    K 4.2 02/22/2022 03:18 PM     02/22/2022 03:18 PM    CO2 25 02/22/2022 03:18 PM    AGAP 6 02/22/2022 03:18 PM     (H) 02/22/2022 03:18 PM    BUN 13 02/22/2022 03:18 PM    CREA 0.81 02/22/2022 03:18 PM    GFRAA >60 02/22/2022 03:18 PM    GFRNA >60 02/22/2022 03:18 PM        CBC:  Lab Results   Component Value Date/Time    WBC 10.3 02/22/2022 07:13 PM    HGB 12.8 02/22/2022 07:13 PM    Hematocrit (POC) 42 04/15/2019 07:44 AM    HCT 38.7 02/22/2022 07:13 PM    PLATELET 855 21/88/5303 07:13 PM    MCV 89.0 02/22/2022 07:13 PM       All Cardiac Markers in the last 24 hours:    Lab Results   Component Value Date/Time    BNPNT 355 (H) 02/22/2022 03:18 PM       Data review:    Tele: NSR    EKG tracing personally reviewed:   NSR, PRWP , NSTT abn     Echocardiogram:    Other cardiac testing:  Cath reviewed     Other imaging:    CTA  IMPRESSION  1. No acute cardiopulmonary disease. No acute pulmonary embolus     2. 6 mm noncalcified pulmonary nodules right upper lobe     Guidelines by the Fleischner society (Radiology 2017 special report) suggest  that patients with low risk for lung cancer and solid nodule(s) less than or  equal to 6 mm in diameter require no follow-up. In patients with a higher risk,  such as smokers, follow-up noncontrast chest CT should be considered at 12  months. Patients with a known malignancy are at increased risk for metastasis  and should receive a three month follow-up.       Signed:  Ary Gomez MD  Interventional Cardiology  2/22/2022          ________________________________________________________    Return Office Visit    June 16, 2021      Cecilio Zeng  62year old F (1960)  Account #: [de-identified]  PRIMARY CARE PHYSICIAN:  Meme Wolf MD  CARDIOLOGIST:  Aysha Ramirez. Andres SUGGS    REASON FOR VISIT:  This 62year old female presents for CAD and Hypertension. HISTORY OF PRESENT ILLNESS:   6/21: Doing well. planning to start with stationary bike and working on weight loss. Down 6# and wants to be less than 200#. Goal is to reduce meds. No CP, palps, edema or syncope. Mild dyspnea stable. Not smoking, but  is.   12/20:  Smoked her first cigarette today (son made her angry); chronically dyspneic; wt gain. No CP.  9/20:  Still has teeth issues; wanted to talk about coming off DAPT. Anginal symptoms are gone. 8/20: s/p PCI 8/14/2020. Was eating a piece of pizza and broke a tooth yesterday. Dentist has offered an extraction but required seeing us first. She feels better since recent PCI, but having lots of muscle cramps. Admits to being anxious. Quit smoking. ALVAREZ improved. Occasional, brief episodes of left side chest discomfort occur at random.  No exertional symptoms. 2020: Follow up after NSTEMI and PCI. Still has some very mild ALVAREZ. Improved overall, but she doesn't exercise and is still smoking. Cutting back. Arthritis is limiting. Taking meds regularly, but hasn't taken anything yet for the morning. Compliant with CPAP. No palps, edema or syncope. Problems:  1. CAD -  of RCA, NSTEMI 3/2020 with attempt of RCA PCI, only wire would cross. Also 80% OM2 lesion. NSTEMI 2020 with 2V CAD. Underwent PTCA/PCI of RCA  with JES x2 and PTCA/PCI of OM2 with JES. NSTEMI 2020 s/p successful PTCA and PCI of the RPLB with a JES. 2. HTN  3. HLD  4. EM - CPAP  5. GERD  6. Tobacco use; Q   7. Arthritis. CARDIAC HISTORY  CAD:  1 Chest Pain Syndrome, atypical. ALVAREZ. [Non-Ischemic Stress] - 3/2015     RISK FACTORS:  1 Tobacco Use       CARDIOVASCULAR PROCEDURES  Procedure Date Results   Cath 2020 LM:  ok  LAD:  mild disease  LCx:  ok.  OM2 patent stents. RCA:  patent stents. PLB w/ mid 85%. EF 60%. Echo 2015 EF 0.60 (60%), Mild-Moderate LVH, Impaired Relaxation Diastolic Dysfunction, Normal RV. No valvular heart disease. EKG 12/10/2020 Sinus Rhythm, Nonspecific ST Twave changes   MPI 2016 EF 0.67 (67%), No Ischemia, 6:15. -CP,-ecg. MPI 2015 EF 0.64 (64%), No Ischemia, 8:27. -cp,-ecg. PCI 2020 RPLB:  2.0 x 15mm Gaurav JES. ACTIVE ALLERGIES:  Ingredient Reaction Comment   NO KNOWN ALLERGIES       None    PROBLEM LIST:  Problem Description Chronic   Chest pain Y       PAST MEDICAL/SURGICAL HISTORY  (Detailed)    Disease/disorder Onset Date Surgical History Date Comments   kidney stone surgery       received J&J vaccine 3/28/21       sleep apnea         OBSTETRIC HISTORY:  Not currently pregnant.    Family History  (Detailed)  Relationship Family Member Name  Age at Death Condition Onset Age Cause of Death   Father  Y  Stroke  N   Father  Y  Sudden death  [de-identified]   Father  Y  Hypertension  N   Mother Y  Coronary artery disease 68 N   Mother    Myocardial infarction 68 N   Mother  Y  Stroke 68 N   Mother  Y  Hypertension 68 N   Mother  Y  Congestive heart failure 68 N   Mother    Diabetes mellitus 68 N     SOCIAL HISTORY  (Detailed)  Tobacco use reviewed. Preferred language is [place]. EDUCATION/EMPLOYMENT/OCCUPATION  Employment History Status Retired Restrictions                   MARITAL STATUS/FAMILY/SOCIAL SUPPORT  Currently . Smoking status: Former smoker. SMOKING STATUS  Use Status Type Smoking Status Usage Per Day Years Used Total Pack Years   yes  Former smoker        TOBACCO CESSATION INFORMATION  Date Counseled By Order Status Description Code Tobacco Cessation Information   03/03/2016      Smoking effects education   03/03/2016 Bertrand Barker Tobacco cessation counseling completed   Tobacco Cessation Counseling   05/08/2020 Kelli Delgado Tobacco cessation counseling completed   Counseling about tobacco use (procedure)   06/08/2020 Matilde Nicholson Tobacco cessation counseling completed   Counseling about tobacco use (procedure)   09/02/2020 Matilde Nicholson Tobacco cessation counseling completed   Counseling about tobacco use (procedure)   12/10/2020 Matilde Nicholson Tobacco cessation counseling completed   Smoking and tobacco use cessation counseling visit, intermediate, > 3 min up to 10 min         ALCOHOL  There is no history of alcohol use. CAFFEINE  The patient uses caffeine: coffee. REVIEW OF SYMPTOMS:    CONST - Negative for weight gain, weight loss, fever. EYES - Negative for visual changes. ENT - Negative for hearing loss. RESP - Negative for snoring, hemoptysis, dyspnea. CARD - Negative for chest pain, diaphoresis, orthopnea, palpitation, syncope, PND.  VASC - Negative for claudication, edema. GI - Negative for nausea, reflux, bleeding.  - Negative for hematuria, nocturia. REPROD - Negative for Hx of OCP.  ENDO - Negative for goiter, tremors.   NEURO - Negative for dizziness, memory loss, seizures. PSYCH - Negative for depression, hallucinations. DERM - Negative for rash, skin sores. M/S - Negative for joint pain, myalgia. HEMAT - Negative for acute anemia, thrombocytopenia. VITAL SIGNS  Time BP mm/Hg Pulse /min Resp /min Temp F Ht ft Ht in Ht cm Wt lb Wt kg BMI kg/m2 BSA m2 O2 Sat%   10:46 /60 66   5.0 5.00 165.10 235.00 106. 594 39.11  97       PHYSICAL EXAM:  Exam Findings Details   Const Neg Level of Distress - Awake / Alert. Appearance - Well Developed. Const Pos Nourishment - Obese. Eyes Neg Lids/External - Bilateral Normal.  Conjunctiva - Bilateral Normal.   NMT Neg Oral Mucosa - Moist, No Cyanosis, No Pallor. Neck Neg JVP - Less Than 8. Resp Neg Respirations - Nonlabored. Breath Sounds - Clear Throughout. Rales - Absent. Wheezes - Absent. Rhonchi - Absent. Cardiac Neg Rhythm - Regular. Palpation - PMI Normal.  Heart Sounds - S1 Normal, S2 Normal, No S3, No S4. Extra Sounds - None. Murmurs - None. M/S Neg Gait - Normal.  Able to Exercise - Yes. EXT Neg Clubbing - Absent. Lower Extremity Edema - Absent. Skin Neg Venous Stasis Ulcer - Absent. Neuro Neg Level of Consciousness - Alert. Psych Neg Orientation - Oriented to Time, Person, Place. Mood - Appropriate. IMPRESSION AND PLAN  01. Atherosclerosis of native coronary artery of native heart with stable angina pectoris (I25.118): This condition is stable. Mild ALVAREZ likely multifactorial (weight, deconditioning, former smoking, etc). See HPI for details. No angina. 02. NSTEMI (non-ST elevated myocardial infarction) (I21.4):  Hx NSTEMI 3/2020, 5/2020 and 8/2020. On appropriate therapy with ACE, BB, spironolactone, aspirin, ticagrelor, statin, and amlodipine. See HPI for details. 03. Essential (primary) hypertension (I10): We will target systolic blood pressure below 843 and diastolic pressure below 80. BP in office today 112/66.  Wants to reduce meds if possible. Will reduce amlodipine from 10mg to 5mg QD. Continue to monitor at home and reassess at [facility] in six months. Encouraged efforts at weight loss. Adequately controlled. 04. Nicotine dependence, unspecified, uncomplicated (M06.389):  Quit!   05. EM on CPAP (G47.33):  Treated. 06. Dependence on other enabling machines and devices (Z99.89): Compliant with CPAP.   07. Hyperlipidemia associated with type 2 diabetes mellitus (E11.69): On chronic medical therapy. Will target LDL <70.   08. Hyperlipidemia, unspecified (E78.5): We will continue the current therapy. 09. Long term (current) use of aspirin (Z79.82)   10. Long term (current) use of antithrombotics/antiplatelets (N93.89)   11. Body mass index [BMI] 39.0-39.9, adult (Z68.39): The patient was instructed on AHA diet and regular exercise. ORDERS:  1 Dietary management education, guidance, and counseling    2 Return office visit with Marcos Davis DO in 6 Months. 3 The patient was instructed on AHA diet and regular exercise.         FINAL MEDICATION LIST  Medication Sig Desc Non-VCS   amlodipine 5 mg tablet take 1 tablet by oral route  every day N   Aspir-81 mg tablet,delayed release take 1 tablet by oral route  every day Y   atorvastatin 80 mg tablet TAKE 1 TABLET BY MOUTH EVERY DAY N   Brilinta 90 mg tablet TAKE 1 TABLET BY MOUTH TWICE DAILY N   glipizide 5 mg tablet take 1 tablet by oral route 2 times every day before meals Y   ibuprofen 200 mg tablet take 1 tablet by oral route  every 6 hours as needed with food Y   isosorbide mononitrate ER 30 mg tablet,extended release 24 hr TAKE 1 TABLET BY MOUTH EVERY DAY IN THE MORNING N   lisinopril 40 mg tablet TAKE 1 TABLET BY MOUTH EVERY DAY N   metformin  mg tablet,extended release 24 hr take 1 tablet by oral route  every day with the evening meal Y   metoprolol succinate  mg tablet,extended release 24 hr TAKE 1 TABLET BY MOUTH EVERY DAY N   pantoprazole 40 mg tablet,delayed release take 1 tablet by oral route  every day Y   spironolactone 25 mg tablet TAKE 1 TABLET BY MOUTH EVERY DAY N       Electronically signed by:       Júnior Lew NP 06/16/2021  @ 11:01 AM    cc:     Glenis Dsouza DO     Document generated by: Júnior Lew 06/16/2021

## 2022-02-23 NOTE — PROGRESS NOTES
Hospitalist Progress Note    NAME: Ariadne Bustamante   :  1960   MRN:  [id number]       Assessment / Plan:  NSTEMI    Troponin 187--201  C/w heparin drip  Cath in am, npo after midnight  Continue aspirin and Brilinta     DM  Hold oral hypoglycemic agent  C/w sliding scale     Hyperlipidemia  Continue Lipitor     Incidental finding 6 mm pulmonary nodule  Referral to pulmonary as outpatient     Hypertension  continue home antihypertensive medication     Code Status: Full   Surrogate Decision Maker:     DVT Prophylaxis: Heparin  GI Prophylaxis: not indicated     Obesity/BMI 39    Estimated discharge date:  Barriers:     Subjective:     Chief Complaint / Reason for Physician Visit  Follow up for NSTEMI  She denies any chest tightness today    Review of Systems:  Symptom Y/N Comments  Symptom Y/N Comments   Fever/Chills    Chest Pain     Poor Appetite    Edema     Cough    Abdominal Pain     Sputum    Joint Pain     SOB/ALVAREZ    Pruritis/Rash     Nausea/vomit    Tolerating PT/OT     Diarrhea    Tolerating Diet     Constipation    Other       Could NOT obtain due to:      Objective:     VITALS:   Last 24hrs VS reviewed since prior progress note.  Most recent are:  Patient Vitals for the past 24 hrs:   Temp Pulse Resp BP SpO2   22 1240    (!) 156/75    22 1223 98.1 °F (36.7 °C) 60 20 (!) 147/67 98 %   22 1100  68  (!) 144/71    22 0934  (!) 58  (!) 143/73    22 0900  (!) 58 18 (!) 145/70 98 %   22 0800  62      22 0400 98.8 °F (37.1 °C) (!) 59 20 (!) 118/43 97 %   22 0030  65 20 136/63 97 %   22 0015  61 19 134/66 97 %   22 0006 98.5 °F (36.9 °C) 64 18 129/64 97 %   22 0001  61 18 129/62 97 %   22 2345  64 19 126/63 96 %   22 2330  60 20 133/67 98 %   22 2315  60 19 126/65 97 %   22 2300  (!) 59 20 129/64 97 %   22 2245  61 19 131/65 97 %   22 2230  64 15 138/67 99 %   22 2227 98.5 °F (36.9 °C) 63 18 133/66 98 %   02/22/22 2000  64    02/22/22 1507 98.1 °F (36.7 °C) 68 16 (!) 156/75 99 %       Intake/Output Summary (Last 24 hours) at 2/23/2022 1422  Last data filed at 2/23/2022 0400  Gross per 24 hour   Intake 67.34 ml   Output    Net 67.34 ml        I had a face to face encounter and independently examined this patient on 2/23/2022, as outlined below:  PHYSICAL EXAM:  General: WD, WN. Alert, cooperative, no acute distress    EENT:  EOMI. Anicteric sclerae. MMM  Resp:  CTA bilaterally, no wheezing or rales. No accessory muscle use  CV:  Regular  rhythm,  No edema  GI:  Soft, Non distended, Non tender. +Bowel sounds  Neurologic:  Alert and oriented X 3, normal speech,   Psych:   Good insight. Not anxious nor agitated  Skin:  No rashes. No jaundice    Reviewed most current lab test results and cultures  YES  Reviewed most current radiology test results   YES  Review and summation of old records today    NO  Reviewed patient's current orders and MAR    YES  PMH/SH reviewed - no change compared to H&P  ________________________________________________________________________  Care Plan discussed with:    Comments   Patient y    Family      RN y    Care Manager     Consultant                        Multidiciplinary team rounds were held today with , nursing, pharmacist and clinical coordinator. Patient's plan of care was discussed; medications were reviewed and discharge planning was addressed.      ________________________________________________________________________  Total NON critical care TIME: 37 Minutes    Total CRITICAL CARE TIME Spent:   Minutes non procedure based      Comments   >50% of visit spent in counseling and coordination of care     ________________________________________________________________________  Ann Number, MD     Procedures: see electronic medical records for all procedures/Xrays and details which were not copied into this note but were reviewed prior to creation of Plan. LABS:  I reviewed today's most current labs and imaging studies.   Pertinent labs include:  Recent Labs     02/23/22  0200 02/22/22  1913 02/22/22  1518   WBC 10.0 10.3 9.0   HGB 12.1 12.8 12.6   HCT 37.3 38.7 38.8    268 275     Recent Labs     02/23/22  0200 02/22/22  1518    139   K 3.4* 4.2   * 108   CO2 24 25   * 237*   BUN 14 13   CREA 0.71 0.81   CA 9.0 8.9   ALB  --  3.3*   TBILI  --  0.3   ALT  --  28       Signed: Ann Alvarado MD

## 2022-02-24 VITALS
RESPIRATION RATE: 16 BRPM | HEIGHT: 65 IN | BODY MASS INDEX: 40 KG/M2 | DIASTOLIC BLOOD PRESSURE: 68 MMHG | WEIGHT: 240.08 LBS | SYSTOLIC BLOOD PRESSURE: 135 MMHG | TEMPERATURE: 98 F | OXYGEN SATURATION: 93 % | HEART RATE: 61 BPM

## 2022-02-24 LAB
ANION GAP SERPL CALC-SCNC: 6 MMOL/L (ref 5–15)
APTT PPP: 54.7 SEC (ref 22.1–31)
APTT PPP: 54.7 SEC (ref 22.1–31)
BUN SERPL-MCNC: 19 MG/DL (ref 6–20)
BUN/CREAT SERPL: 28 (ref 12–20)
CALCIUM SERPL-MCNC: 8.9 MG/DL (ref 8.5–10.1)
CHLORIDE SERPL-SCNC: 108 MMOL/L (ref 97–108)
CO2 SERPL-SCNC: 25 MMOL/L (ref 21–32)
CREAT SERPL-MCNC: 0.67 MG/DL (ref 0.55–1.02)
GLUCOSE BLD STRIP.AUTO-MCNC: 211 MG/DL (ref 65–117)
GLUCOSE BLD STRIP.AUTO-MCNC: 225 MG/DL (ref 65–117)
GLUCOSE BLD STRIP.AUTO-MCNC: 234 MG/DL (ref 65–117)
GLUCOSE SERPL-MCNC: 248 MG/DL (ref 65–100)
POTASSIUM SERPL-SCNC: 3.6 MMOL/L (ref 3.5–5.1)
SERVICE CMNT-IMP: ABNORMAL
SODIUM SERPL-SCNC: 139 MMOL/L (ref 136–145)
THERAPEUTIC RANGE,PTTT: ABNORMAL SECS (ref 58–77)
THERAPEUTIC RANGE,PTTT: ABNORMAL SECS (ref 58–77)

## 2022-02-24 PROCEDURE — 74011250637 HC RX REV CODE- 250/637: Performed by: INTERNAL MEDICINE

## 2022-02-24 PROCEDURE — 77030008543 HC TBNG MON PRSS MRTM -A: Performed by: INTERNAL MEDICINE

## 2022-02-24 PROCEDURE — 74011000636 HC RX REV CODE- 636: Performed by: INTERNAL MEDICINE

## 2022-02-24 PROCEDURE — B2111ZZ FLUOROSCOPY OF MULTIPLE CORONARY ARTERIES USING LOW OSMOLAR CONTRAST: ICD-10-PCS | Performed by: INTERNAL MEDICINE

## 2022-02-24 PROCEDURE — 77030019698 HC SYR ANGI MDLON MRTM -A: Performed by: INTERNAL MEDICINE

## 2022-02-24 PROCEDURE — 77030010221 HC SPLNT WR POS TELE -B: Performed by: INTERNAL MEDICINE

## 2022-02-24 PROCEDURE — 77030015766: Performed by: INTERNAL MEDICINE

## 2022-02-24 PROCEDURE — C1769 GUIDE WIRE: HCPCS | Performed by: INTERNAL MEDICINE

## 2022-02-24 PROCEDURE — 36415 COLL VENOUS BLD VENIPUNCTURE: CPT

## 2022-02-24 PROCEDURE — C1894 INTRO/SHEATH, NON-LASER: HCPCS | Performed by: INTERNAL MEDICINE

## 2022-02-24 PROCEDURE — 99153 MOD SED SAME PHYS/QHP EA: CPT | Performed by: INTERNAL MEDICINE

## 2022-02-24 PROCEDURE — 93458 L HRT ARTERY/VENTRICLE ANGIO: CPT | Performed by: INTERNAL MEDICINE

## 2022-02-24 PROCEDURE — 82962 GLUCOSE BLOOD TEST: CPT

## 2022-02-24 PROCEDURE — 77030042317 HC BND COMPR HEMSTAT -B: Performed by: INTERNAL MEDICINE

## 2022-02-24 PROCEDURE — 80048 BASIC METABOLIC PNL TOTAL CA: CPT

## 2022-02-24 PROCEDURE — 74011000250 HC RX REV CODE- 250: Performed by: INTERNAL MEDICINE

## 2022-02-24 PROCEDURE — 77030018729 HC ELECTRD DEFIB PAD CARD -B: Performed by: INTERNAL MEDICINE

## 2022-02-24 PROCEDURE — 74011000258 HC RX REV CODE- 258: Performed by: EMERGENCY MEDICINE

## 2022-02-24 PROCEDURE — 74011250636 HC RX REV CODE- 250/636: Performed by: EMERGENCY MEDICINE

## 2022-02-24 PROCEDURE — 4A023N7 MEASUREMENT OF CARDIAC SAMPLING AND PRESSURE, LEFT HEART, PERCUTANEOUS APPROACH: ICD-10-PCS | Performed by: INTERNAL MEDICINE

## 2022-02-24 PROCEDURE — 74011636637 HC RX REV CODE- 636/637: Performed by: INTERNAL MEDICINE

## 2022-02-24 PROCEDURE — 2709999900 HC NON-CHARGEABLE SUPPLY: Performed by: INTERNAL MEDICINE

## 2022-02-24 PROCEDURE — 85730 THROMBOPLASTIN TIME PARTIAL: CPT

## 2022-02-24 PROCEDURE — 99152 MOD SED SAME PHYS/QHP 5/>YRS: CPT | Performed by: INTERNAL MEDICINE

## 2022-02-24 PROCEDURE — 74011250636 HC RX REV CODE- 250/636: Performed by: INTERNAL MEDICINE

## 2022-02-24 RX ORDER — SODIUM CHLORIDE 0.9 % (FLUSH) 0.9 %
5-40 SYRINGE (ML) INJECTION AS NEEDED
Status: CANCELLED | OUTPATIENT
Start: 2022-02-24

## 2022-02-24 RX ORDER — SODIUM CHLORIDE 0.9 % (FLUSH) 0.9 %
5-40 SYRINGE (ML) INJECTION EVERY 8 HOURS
Status: CANCELLED | OUTPATIENT
Start: 2022-02-24

## 2022-02-24 RX ORDER — HEPARIN SODIUM 1000 [USP'U]/ML
INJECTION, SOLUTION INTRAVENOUS; SUBCUTANEOUS AS NEEDED
Status: DISCONTINUED | OUTPATIENT
Start: 2022-02-24 | End: 2022-02-24 | Stop reason: HOSPADM

## 2022-02-24 RX ORDER — VERAPAMIL HYDROCHLORIDE 2.5 MG/ML
INJECTION, SOLUTION INTRAVENOUS AS NEEDED
Status: DISCONTINUED | OUTPATIENT
Start: 2022-02-24 | End: 2022-02-24 | Stop reason: HOSPADM

## 2022-02-24 RX ORDER — HEPARIN SODIUM 200 [USP'U]/100ML
INJECTION, SOLUTION INTRAVENOUS
Status: COMPLETED | OUTPATIENT
Start: 2022-02-24 | End: 2022-02-24

## 2022-02-24 RX ORDER — MIDAZOLAM HYDROCHLORIDE 1 MG/ML
INJECTION, SOLUTION INTRAMUSCULAR; INTRAVENOUS AS NEEDED
Status: DISCONTINUED | OUTPATIENT
Start: 2022-02-24 | End: 2022-02-24 | Stop reason: HOSPADM

## 2022-02-24 RX ORDER — LIDOCAINE HYDROCHLORIDE 10 MG/ML
INJECTION, SOLUTION EPIDURAL; INFILTRATION; INTRACAUDAL; PERINEURAL AS NEEDED
Status: DISCONTINUED | OUTPATIENT
Start: 2022-02-24 | End: 2022-02-24 | Stop reason: HOSPADM

## 2022-02-24 RX ORDER — FENTANYL CITRATE 50 UG/ML
INJECTION, SOLUTION INTRAMUSCULAR; INTRAVENOUS AS NEEDED
Status: DISCONTINUED | OUTPATIENT
Start: 2022-02-24 | End: 2022-02-24 | Stop reason: HOSPADM

## 2022-02-24 RX ADMIN — SPIRONOLACTONE 25 MG: 25 TABLET ORAL at 08:33

## 2022-02-24 RX ADMIN — SODIUM CHLORIDE, PRESERVATIVE FREE 10 ML: 5 INJECTION INTRAVENOUS at 05:39

## 2022-02-24 RX ADMIN — PANTOPRAZOLE SODIUM 40 MG: 40 TABLET, DELAYED RELEASE ORAL at 08:33

## 2022-02-24 RX ADMIN — Medication 3 UNITS: at 17:21

## 2022-02-24 RX ADMIN — AMLODIPINE BESYLATE 10 MG: 5 TABLET ORAL at 08:33

## 2022-02-24 RX ADMIN — ASPIRIN 81 MG: 81 TABLET, COATED ORAL at 08:33

## 2022-02-24 RX ADMIN — LISINOPRIL 40 MG: 20 TABLET ORAL at 08:33

## 2022-02-24 RX ADMIN — TICAGRELOR 90 MG: 90 TABLET ORAL at 08:33

## 2022-02-24 RX ADMIN — HEPARIN SODIUM 22 UNITS/KG/HR: 1000 INJECTION INTRAVENOUS; SUBCUTANEOUS at 05:38

## 2022-02-24 RX ADMIN — NITROGLYCERIN 1 INCH: 20 OINTMENT TOPICAL at 08:34

## 2022-02-24 RX ADMIN — ACETAMINOPHEN 650 MG: 325 TABLET ORAL at 15:08

## 2022-02-24 NOTE — PROGRESS NOTES
0700: Bedside and Verbal shift change report given to Tamy Aguayo RN (oncoming nurse) by Jose Card (offgoing nurse). Report included the following information SBAR, Kardex, Intake/Output, MAR and Cardiac Rhythm NSR.     0800: Called Cath lab to confirm what medications to give. All medications can be given. Called MD Danni regarding insulin. 1942: TRANSFER - OUT REPORT:    Verbal report given to DANIEL Colmenares(name) on Brayan Enamorado  being transferred to [facility](unit) for ordered procedure       Report consisted of patients Situation, Background, Assessment and   Recommendations(SBAR). Information from the following report(s) SBAR, Kardex, Intake/Output, MAR and Cardiac Rhythm NSR was reviewed with the receiving nurse. Lines:   Peripheral IV 02/22/22 Left Antecubital (Active)   Site Assessment Clean, dry, & intact 02/24/22 0729   Phlebitis Assessment 0 02/24/22 0729   Infiltration Assessment 0 02/24/22 0729   Dressing Status Clean, dry, & intact 02/24/22 0729   Dressing Type Transparent 02/24/22 0729   Hub Color/Line Status Pink;Flushed; Infusing 02/24/22 4023   Action Taken Open ports on tubing capped 02/23/22 0400   Alcohol Cap Used Yes 02/24/22 0729        Opportunity for questions and clarification was provided.       Patient transported with:   Monitor  Registered Nurse  Tech

## 2022-02-24 NOTE — PROGRESS NOTES
Hospitalist Progress Note    NAME: Enrique Hernandez   :  1960   MRN:  [id number]       Assessment / Plan:  NSTEMI    Troponin 187--201  C/w heparin drip  Going for cath today  Continue aspirin and Brilinta     DM  Hold oral hypoglycemic agent  C/w sliding scale     Hyperlipidemia  Continue Lipitor     Incidental finding 6 mm pulmonary nodule  Will need repeat CT in 6 months  COunselled on smoking cessation     Hypertension  continue home antihypertensive medication     Code Status: Full   Surrogate Decision Maker:     DVT Prophylaxis: Heparin  GI Prophylaxis: not indicated     Obesity/BMI 39    Estimated discharge date:  Barriers:     Subjective:     Chief Complaint / Reason for Physician Visit  Follow up for NSTEMI  No chest pain/ tigtness     Review of Systems:  Symptom Y/N Comments  Symptom Y/N Comments   Fever/Chills    Chest Pain     Poor Appetite    Edema     Cough    Abdominal Pain     Sputum    Joint Pain     SOB/ALVAREZ    Pruritis/Rash     Nausea/vomit    Tolerating PT/OT     Diarrhea    Tolerating Diet     Constipation    Other       Could NOT obtain due to:      Objective:     VITALS:   Last 24hrs VS reviewed since prior progress note. Most recent are:  Patient Vitals for the past 24 hrs:   Temp Pulse Resp BP SpO2   22 0959 98.1 °F (36.7 °C) (!) 57 16 138/89 99 %   22 0729 98 °F (36.7 °C) 60 18 (!) 141/71 97 %   22 0340 98.5 °F (36.9 °C) (!) 59 20 (!) 144/68 96 %   22 2306 98.4 °F (36.9 °C) 61 22 (!) 138/55 96 %   22 1959 98.5 °F (36.9 °C) 68 23 (!) 149/74 97 %   22 1746 98.3 °F (36.8 °C) 60 20 134/68 96 %       Intake/Output Summary (Last 24 hours) at 2022 1302  Last data filed at 2022 2077  Gross per 24 hour   Intake 634.48 ml   Output    Net 634.48 ml        I had a face to face encounter and independently examined this patient on 2022, as outlined below:  PHYSICAL EXAM:  General: WD, WN. Alert, cooperative, no acute distress    EENT:  EOMI. Anicteric sclerae. MMM  Resp:  CTA bilaterally, no wheezing or rales. No accessory muscle use  CV:  Regular  rhythm,  No edema  GI:  Soft, Non distended, Non tender. +Bowel sounds  Neurologic:  Alert and oriented X 3, normal speech,   Psych:   Good insight. Not anxious nor agitated  Skin:  No rashes. No jaundice    Reviewed most current lab test results and cultures  YES  Reviewed most current radiology test results   YES  Review and summation of old records today    NO  Reviewed patient's current orders and MAR    YES  PMH/SH reviewed - no change compared to H&P  ________________________________________________________________________  Care Plan discussed with:    Comments   Patient y    Family      RN y    Care Manager     Consultant                        Multidiciplinary team rounds were held today with , nursing, pharmacist and clinical coordinator. Patient's plan of care was discussed; medications were reviewed and discharge planning was addressed. ________________________________________________________________________  Total NON critical care TIME: 37 Minutes    Total CRITICAL CARE TIME Spent:   Minutes non procedure based      Comments   >50% of visit spent in counseling and coordination of care     ________________________________________________________________________  Antoni Esparza MD     Procedures: see electronic medical records for all procedures/Xrays and details which were not copied into this note but were reviewed prior to creation of Plan. LABS:  I reviewed today's most current labs and imaging studies.   Pertinent labs include:  Recent Labs     02/23/22  0200 02/22/22  1913 02/22/22  1518   WBC 10.0 10.3 9.0   HGB 12.1 12.8 12.6   HCT 37.3 38.7 38.8    268 275     Recent Labs     02/24/22  0042 02/23/22  0200 02/22/22  1518    140 139   K 3.6 3.4* 4.2    110* 108   CO2 25 24 25   * 175* 237*   BUN 19 14 13   CREA 0.67 0.71 0.81   CA 8.9 9.0 8. 9   ALB  --   --  3.3*   TBILI  --   --  0.3   ALT  --   --  28       Signed: Aleksander Swann MD

## 2022-02-24 NOTE — PROGRESS NOTES
Brief Procedure Note    Patient: Mari Jacobo MRN: [id number]  SSN: [id number]    YOB: 1960  Age: 65 y.o. Sex: female      Date of Procedure: 2/24/2022     Pre-procedure Diagnosis: CP    Post-procedure Diagnosis: 1v CAD, patent stents, normal LVEDP    Procedure: LHC, cors    Performed By: Jose Cruz Naranjo III, DO     Anesthesia: Moderate Sedation    Estimated Blood Loss: Less than 10 mL      Specimens:  None    Findings: as above    Complications: None    Implants: None    Recommendations: Continue medical therapy. OK for discharge later tonight.     Signed By: Sunny Moreno DO     February 24, 2022

## 2022-02-24 NOTE — PROGRESS NOTES
Problem: Falls - Risk of  Goal: *Absence of Falls  Description: Document Henderson Sourav Fall Risk and appropriate interventions in the flowsheet.   Outcome: Progressing Towards Goal  Note: Fall Risk Interventions:  Mobility Interventions: Bed/chair exit alarm,Communicate number of staff needed for ambulation/transfer,Patient to call before getting OOB         Medication Interventions: Bed/chair exit alarm,Evaluate medications/consider consulting pharmacy,Patient to call before getting OOB

## 2022-02-24 NOTE — DISCHARGE INSTR - DIET
[address] [facility], [address]   [phone]  [address]    [email or web address]    Patient Discharge Instructions    Curt Nelson / [id number] : 1960    Admitted 2022 Discharged: 2022       It is important that you take the medication exactly as they are prescribed. Keep your medication in the bottles provided by the pharmacist and keep a list of the medication names, dosages, and times to be taken in your wallet. Do not take other medications without consulting your doctor. BRING ALL OF YOUR MEDICINES TO YOUR OFFICE VISIT with Lashon Rossi DO or my nurse practitioner, Saji Crenshaw. .    Follow-up with Lashon Rossi DO or my nurse practitioner, Saji Crenshaw in 2 weeks. Cardiac Catheterization  Discharge Instructions    Transradial Catheterization Discharge Instructions (WRIST)    Discharge instructions: Your radial artery in your wrist was used for your cardiac catheterization. This site may be slightly bruised and sore following your procedure. Expect mild tingling or the hand and tenderness at the puncture site for up to 3 days. Excess movement of the wrist used should be avoided for the next 24-48 hours. No lifting over 2 pounds (approximately a ½ gallon of milk) with this arm for 24 hours. Keep the site of the procedure covered with a bandage for 24 hours. You may shower the day after your procedure. Do not take a tub bath or submerge the puncture site in water for 48 hours. No heavy impact activity/lifting > 30 pounds for 1 week. If bleeding of the wrist occurs at home:   If the site on your wrist where you had the catheterization procedure begins to bleed, do not panic. Place 1 or 2 fingers over the puncture site and hold pressure to stop the bleeding. You may be able to feel your pulse as you hold pressure. Lift your fingers after 5 minutes to see if the bleeding has stopped.    Once the bleeding has stopped, gently wipe the wrist area clean and cover with a bandage. If the bleeding from your wrist does not stop after 15 minutes, or if there is a large amount of bleeding or spurting, call 911 immediately (do not drive yourself to the hospital). Other concerns: The site may be slightly bruised and sore following your procedure. Should any of the following occur, contact your physician immediately:   Any cool or coldness of the arm, discoloration over a large area, ongoing numbness or any abnormal sensations , moderate to severe pain or swelling in the arm. Redness, soreness, swelling, chills or fever, or colored drainage at the procedure site within 3-7 days after your procedure. If you have any further questions or concerns regarding your procedure please call the Cardiac Cath Lab office at [phone]. During regular business hours ask to speak to Dr. Cali Naranjo. During non-business hours the answering service will answer. Ask to speak to the physician on call for [facility].     Transfemoral Catheterization Discharge Instructions (GROIN)    Do not drive, operate any machinery, or sign any legal documents for 24 hours after your procedure. You must have someone to drive you home. You may take a shower 24 hours after your cardiac catheterization. Be sure to get the dressing wet and then remove it; gently wash the area with warm soapy water. Pat dry and leave open to air. To help prevent infections, be sure to keep the cath site clean and dry. No lotions, creams, powders, ointments, etc. in the cath site for approximately 1 week. Do not take a tub bath, get in a hot tub or swimming pool for approximately 5 days or until the cath site is completely healed. No strenuous activity or heavy lifting over 10 lbs. for 7 days. Drink plenty of fluids for 24-48 hours after your cath to flush the contrast dye from your kidneys. No alcoholic beverages for 24 hours.   You may resume your previous diet (low fat, low cholesterol) after your cath. After your cath, some bruising or discomfort is common during the healing process. Tylenol, 1-2 tablets every 6 hours as needed, is recommended if you experience any discomfort. If you experience any signs or symptoms of infection such as fever, chills, or poorly healing incision, persistent tenderness or swelling in the groin, redness and/or warmth to the touch, numbness, significant tingling or pain at the groin site or affected extremity, rash, drainage from the cath site, or if the leg feels tight or swollen, call your physician right away. If bleeding at the cath site occurs, take a clean gauze pad and apply direct pressure to the groin just above the puncture site. Call 911 immediately, and continue to apply direct pressure until an ambulance gets to your location. You may return to work  2  days after your cardiac cath if no groin bleeding. Information obtained by :  I understand that if any problems occur once I am at home I am to contact my physician. I understand and acknowledge receipt of the instructions indicated above. R.N.'s Signature                                                                  Date/Time                                                                                                                                              Patient or Representative Signature                                                          Date/Time      Winston Cao III, DO             [address] [facility], [address] [facility]    [phone]  [place], [address]    [email or web address]

## 2022-02-24 NOTE — PROGRESS NOTES
0700: End of Shift Note    Bedside shift change report given to Anita Mcmahon RN (oncoming nurse) by Raf Martinez (offgoing nurse). Report included the following information SBAR, Kardex, Intake/Output, MAR, Recent Results and Cardiac Rhythm NSR    Shift worked:  Night     Shift summary and any significant changes:     Patient has been NPO since midnight. Patient heparin now going at 22 units/kg/hr. Concerns for physician to address:       Zone phone for oncoming shift:          Activity:  Activity Level: Up with Assistance  Number times ambulated in hallways past shift: 0  Number of times OOB to chair past shift: 3    Cardiac:   Cardiac Monitoring: Yes      Cardiac Rhythm: Sinus Rhythm    Access:   Current line(s): PIV     Genitourinary:   Urinary status: voiding    Respiratory:   O2 Device: None (Room air)  Chronic home O2 use?: NO  Incentive spirometer at bedside: YES       GI:  Last Bowel Movement Date: 02/22/22  Current diet:  DIET NPO  Passing flatus: YES  Tolerating current diet: YES       Pain Management:   Patient states pain is manageable on current regimen: YES    Skin:  Leonardo Score: 19  Interventions: increase time out of bed and PT/OT consult    Patient Safety:  Fall Score:  Total Score: 2  Interventions: bed/chair alarm, gripper socks and pt to call before getting OOB       Length of Stay:  Expected LOS: - - -  Actual LOS: 2      Mackenzie Méndez

## 2022-02-24 NOTE — ROUTINE PROCESS
End of Shift Note    Bedside shift change report given to Mackenzie  (oncoming nurse) by Sis Emmanuel RN (offgoing nurse). Report included the following information SBAR    Shift worked:  day      Shift summary and any significant changes:     no complaints of discomfort. Heparin infusing     Concerns for physician to address:  none      Zone phone for oncoming shift:   na        Activity:  Activity Level: Up with Assistance  Number times ambulated in hallways past shift: 0  Number of times OOB to chair past shift: 0    Cardiac:   Cardiac Monitoring: Yes      Cardiac Rhythm: Sinus Rhythm    Access:   Current line(s): PIV     Genitourinary:   Urinary status: voiding    Respiratory:   O2 Device: None (Room air)  Chronic home O2 use?: NO  Incentive spirometer at bedside: NO       GI:  Last Bowel Movement Date: 02/22/22  Current diet:  ADULT DIET Regular  DIET NPO  Passing flatus: NO  Tolerating current diet: YES       Pain Management:   Patient states pain is manageable on current regimen: N/A    Skin:  Leonardo Score: 19  Interventions: increase time out of bed    Patient Safety:  Fall Score:  Total Score: 2  Interventions: pt to call before getting OOB       Length of Stay:  Expected LOS: - - -  Actual LOS: [address], RN

## 2022-02-24 NOTE — PROGRESS NOTES
Problem: Falls - Risk of  Goal: *Absence of Falls  Description: Document Renee Matthew Fall Risk and appropriate interventions in the flowsheet.   Outcome: Progressing Towards Goal  Note: Fall Risk Interventions:  Mobility Interventions: Bed/chair exit alarm,Patient to call before getting OOB         Medication Interventions: Bed/chair exit alarm,Patient to call before getting OOB,Teach patient to arise slowly                   Problem: Patient Education: Go to Patient Education Activity  Goal: Patient/Family Education  Outcome: Progressing Towards Goal     Problem: Patient Education: Go to Patient Education Activity  Goal: Patient/Family Education  Outcome: Progressing Towards Goal     Problem: Unstable angina/NSTEMI: Day of Admission/Day 1  Goal: Off Pathway (Use only if patient is Off Pathway)  Outcome: Progressing Towards Goal  Goal: Activity/Safety  Outcome: Progressing Towards Goal  Goal: Consults, if ordered  Outcome: Progressing Towards Goal  Goal: Diagnostic Test/Procedures  Outcome: Progressing Towards Goal  Goal: Nutrition/Diet  Outcome: Progressing Towards Goal  Goal: Discharge Planning  Outcome: Progressing Towards Goal  Goal: Medications  Outcome: Progressing Towards Goal  Goal: Respiratory  Outcome: Progressing Towards Goal  Goal: Treatments/Interventions/Procedures  Outcome: Progressing Towards Goal  Goal: Psychosocial  Outcome: Progressing Towards Goal  Goal: *Hemodynamically stable  Outcome: Progressing Towards Goal  Goal: *Optimal pain control at patient's stated goal  Outcome: Progressing Towards Goal  Goal: *Lungs clear or at baseline  Outcome: Progressing Towards Goal     Problem: Unstable angina/NSTEMI: Day 2  Goal: Off Pathway (Use only if patient is Off Pathway)  Outcome: Progressing Towards Goal  Goal: Activity/Safety  Outcome: Progressing Towards Goal  Goal: Consults, if ordered  Outcome: Progressing Towards Goal  Goal: Diagnostic Test/Procedures  Outcome: Progressing Towards Goal  Goal: Nutrition/Diet  Outcome: Progressing Towards Goal  Goal: Discharge Planning  Outcome: Progressing Towards Goal  Goal: Medications  Outcome: Progressing Towards Goal  Goal: Respiratory  Outcome: Progressing Towards Goal  Goal: Treatments/Interventions/Procedures  Outcome: Progressing Towards Goal  Goal: Psychosocial  Outcome: Progressing Towards Goal  Goal: *Hemodynamically stable  Outcome: Progressing Towards Goal  Goal: *Optimal pain control at patient's stated goal  Outcome: Progressing Towards Goal  Goal: *Lungs clear or at baseline  Outcome: Progressing Towards Goal     Problem: Unstable Angina/NSTEMI: Discharge Outcomes  Goal: *Hemodynamically stable  Outcome: Progressing Towards Goal  Goal: *Stable cardiac rhythm  Outcome: Progressing Towards Goal  Goal: *Lungs clear or at baseline  Outcome: Progressing Towards Goal  Goal: *Optimal pain control at patient's stated goal  Outcome: Progressing Towards Goal  Goal: *Identifies cardiac risk factors  Outcome: Progressing Towards Goal  Goal: *Verbalizes home exercise program, activity guidelines, cardiac precautions  Outcome: Progressing Towards Goal  Goal: *Verbalizes understanding and describes prescribed diet  Outcome: Progressing Towards Goal  Goal: *Verbalizes name, dosage, time, side effects, and number of days to continue medications  Outcome: Progressing Towards Goal  Goal: *Anxiety reduced or absent  Outcome: Progressing Towards Goal  Goal: *Understands and describes signs and symptoms to report to providers(Stroke Metric)  Outcome: Progressing Towards Goal  Goal: *Describes follow-up/return visits to physicians  Outcome: Progressing Towards Goal  Goal: *Describes available resources and support systems  Outcome: Progressing Towards Goal  Goal: *Influenza immunization  Outcome: Progressing Towards Goal  Goal: *Pneumococcal immunization  Outcome: Progressing Towards Goal  Goal: *Describes smoking cessation resources  Outcome: Progressing Towards Goal

## 2022-02-24 NOTE — DISCHARGE INSTRUCTIONS
HOSPITALIST DISCHARGE INSTRUCTIONS    NAME: Cecilio Zeng   :  1960   MRN:  [id number]     Date/Time:  2022 4:21 PM    ADMIT DATE: 2022     DISCHARGE DATE: 2022     DISCHARGE DIAGNOSIS:  Coronary artery disease    Follow-up Information     Follow up With Specialties Details Why Contact Info    Randy Keenan DO Cardiology, Interventional Cardiology  as scheduled [address]  [address]  [facility]  [phone]      Lopez Ortega MD [address]  [address]  [address] [phone]            MEDICATIONS:  As per medication reconciliation  list  · It is important that you take the medication exactly as they are prescribed. · Keep your medication in the bottles provided by the pharmacist and keep a list of the medication names, dosages, and times to be taken in your wallet. · Do not take other medications without consulting your doctor. Pain Management: per above medications    What to do at Home    Recommended diet:  Cardiac Diet    Recommended activity: Activity as tolerated    If you have questions regarding the hospital related prescriptions or hospital related issues please call [facility] dada Lennon at [phone]. If you experience any of the following symptoms then please call your primary care physician or return to the emergency room if you cannot get hold of your doctor:  Fever, chills, nausea, vomiting, diarrhea, change in mentation, falling, bleeding, shortness of breath,    Follow Up:  Dr. Lopez Ortega MD  you are to call and set up an appointment to see them in 7-10 days. Information obtained by :  I understand that if any problems occur once I am at home I am to contact my physician. I understand and acknowledge receipt of the instructions indicated above. Physician's or R.N.'s Signature                                                                  Date/Time                                                                                                                                              Patient or Representative Signature                                                          Date/Time        Smoking Cessation Program:   [facility] is now offering a proven, interactive, text-based smoking cessation program for FREE! To register, please text Roula Hernandez [phone] or visit [employer]/quit  For more information, please call: [phone]

## 2022-02-24 NOTE — DISCHARGE SUMMARY
Hospitalist Discharge Summary     Patient ID:  Jenny Ruiz  [id number]  55 y.o.  1960 2/22/2022    PCP on record: Juan Esparza MD    Admit date: 2/22/2022  Discharge date and time: 2/24/2022    DISCHARGE DIAGNOSIS:  Coronary artery disease    CONSULTATIONS:  IP CONSULT TO CARDIOLOGY    Excerpted HPI from H&P of Perla Harvey MD:  65years old female from home with past medical history significant for GERD, hypertension, coronary artery disease, DM was referred by cardiology for evaluation of worsening chest heaviness that started about 2 to 3 weeks, denies any shortness of breath denies any productive cough denies any fever and chills denies any dizziness, patient also complained from hand numbness, patient denies any weakness, CTA chest was done show no PE EKG was done show normal sinus rhythm, troponin was checked was found to be elevated.    ______________________________________________________________________  DISCHARGE SUMMARY/HOSPITAL COURSE:  for full details see H&P, daily progress notes, labs, consult notes. She presented with worsening chest heaviness, had cath done today, showed 1 vessel CAD and patent stents. Cardiology recommended medical management and cleared for discharge today        _______________________________________________________________________  Patient seen and examined by me on discharge day. Pertinent Findings:  Gen:    Not in distress  Chest: Clear lungs  CVS:   Regular rhythm. No edema  Abd:  Soft, not distended, not tender  Neuro:  Alert,   _______________________________________________________________________  DISCHARGE MEDICATIONS:   Current Discharge Medication List      CONTINUE these medications which have NOT CHANGED    Details   metFORMIN (GLUMETZA ER) 500 mg TG24 24 hour tablet Take 1,000 mg by mouth two (2) times a day. glipiZIDE SR (GLUCOTROL XL) 10 mg CR tablet Take 10 mg by mouth daily.       ISOSORBIDE MONONITRATE PO Take 30 mg by mouth daily. ticagrelor (BRILINTA) 90 mg tablet Take 90 mg by mouth two (2) times a day. atorvastatin (LIPITOR) 80 mg tablet Take 80 mg by mouth nightly. amLODIPine (NORVASC) 10 mg tablet Take 5 mg by mouth daily. lisinopriL (PRINIVIL, ZESTRIL) 40 mg tablet Take 40 mg by mouth daily. spironolactone (ALDACTONE) 25 mg tablet Take 25 mg by mouth daily. metoprolol succinate (TOPROL-XL) 100 mg tablet Take 100 mg by mouth daily. pantoprazole (PROTONIX) 40 mg tablet Take 40 mg by mouth daily. aspirin delayed-release 81 mg tablet Take  by mouth daily. STOP taking these medications       cyclobenzaprine (FLEXERIL) 10 mg tablet Comments:   Reason for Stopping:                 Patient Follow Up Instructions:    Activity: Activity as tolerated  Diet: Cardiac Diet  Wound Care: As directed    Follow-up with     Follow-up Information     Follow up With Specialties Details Why Contact Info    Vicki Corona, DO Cardiology, Interventional Cardiology  as scheduled [address]   [address]  [address]  [phone]          ________________________________________________________________    Risk of deterioration: low    Condition at Discharge:  Stable  __________________________________________________________________    Disposition  Home with family, no needs    ____________________________________________________________________    Code Status: Full Code  ___________________________________________________________________      Total time in minutes spent coordinating this discharge (includes going over instructions, follow-up, prescriptions, and preparing report for sign off to her PCP) :  >30 minutes    Signed:  Debi Fothergill, MD

## 2022-02-24 NOTE — PROGRESS NOTES
Progress Note      2/24/2022 8:37 AM  NAME: Branden Chan   MRN:  [id number]   Admit Diagnosis: NSTEMI (non-ST elevated myocardial infarction) ([facility] [address] [I21.4]      Problem List:     1. NSTEMI  2. Coronary artery disease.  NSTEMI 3/20 w/ cath @ [facility].  RCA  attempt (wire would cross only); recurrent USA/NSETMI 5/20 w/ PCI of RCA w/ 2 JES and OM1 w/ 1 JES.  8/20 w/ JES to the RPLB. 3. Hypokalemia  4. Diabetes  5. Hypertension  6. EM on CPAPP  7. GERD  8. Hyperlipidemia  9. Tobacco abuse     Assessment/Plan: TnI 201    EF 55-60%    1. NPO for cath today  2. Continue heparin gtt  3. Continue norvasc  4. Continue ASA  5. Continue ticagrelor  6. Continue statin  7. Continue lisinopril  8. Continue NTGp prn  9. Continue aldactone  10. Hold ISMN whilst on NTGp         [x]       High complexity decision making was performed in this patient at high risk for decompensation with multiple organ involvement. Subjective: Branden Chan denies chest pain, dyspnea. Discussed with RN events overnight. Review of Systems:    Symptom Y/N Comments  Symptom Y/N Comments   Fever/Chills N   Chest Pain N    Poor Appetite N   Edema N    Cough N   Abdominal Pain N    Sputum N   Joint Pain N    SOB/ALVAREZ N   Pruritis/Rash N    Nausea/vomit N   Tolerating PT/OT Y    Diarrhea N   Tolerating Diet Y    Constipation N   Other       Could NOT obtain due to:      Objective:      Physical Exam:    Last 24hrs VS reviewed since prior progress note.  Most recent are:    Visit Vitals  BP (!) 141/71 (BP 1 Location: Left upper arm, BP Patient Position: At rest)   Pulse 60   Temp 98 °F (36.7 °C)   Resp 18   Ht 5' 5\" (1.651 m)   Wt 108.9 kg (240 lb 1.3 oz)   LMP  (LMP Unknown)   SpO2 97%   BMI 39.95 kg/m²       Intake/Output Summary (Last 24 hours) at 2/24/2022 8418  Last data filed at 2/24/2022 3023  Gross per 24 hour   Intake 340 ml   Output    Net 340 ml        General Appearance: Well developed, well nourished, alert & oriented x 3, no acute distress. Ears/Nose/Mouth/Throat: Hearing grossly normal.  Neck: Supple. Chest: Lungs clear to auscultation bilaterally. Cardiovascular: Regular rate and rhythm, S1S2 normal, no murmur. Abdomen: Soft, non-tender, bowel sounds are active. Extremities: No edema bilaterally. Skin: Warm and dry. []         Post-cath site without hematoma, bruit, tenderness, or thrill. Distal pulses intact. PMH/ reviewed - no change compared to H&P    Data Review    Telemetry: sinus rhythm     EKG:   [x]  No new EKG for review    Lab Data Personally Reviewed:    Recent Labs     02/23/22  0200 02/22/22  1913   WBC 10.0 10.3   HGB 12.1 12.8   HCT 37.3 38.7    268     Recent Labs     02/24/22  0042 02/23/22  1758 02/23/22  1655   APTT 54.7* 34.3* 31.3*      Recent Labs     02/24/22  0042 02/23/22  0200 02/22/22  1518    140 139   K 3.6 3.4* 4.2    110* 108   CO2 25 24 25   BUN 19 14 13   CREA 0.67 0.71 0.81   * 175* 237*   CA 8.9 9.0 8.9     No results for input(s): CPK, CKNDX, TROIQ in the last 72 hours. No lab exists for component: CPKMB  Lab Results   Component Value Date/Time    Cholesterol, total 102 08/14/2020 06:26 AM    HDL Cholesterol 37 08/14/2020 06:26 AM    LDL, calculated 46.6 08/14/2020 06:26 AM    Triglyceride 92 08/14/2020 06:26 AM    CHOL/HDL Ratio 2.8 08/14/2020 06:26 AM       Recent Labs     02/22/22  1518   AP 63   TP 7.0   ALB 3.3*   GLOB 3.7     No results for input(s): PH, PCO2, PO2 in the last 72 hours.     Medications Personally Reviewed:    Current Facility-Administered Medications   Medication Dose Route Frequency    nitroglycerin (NITROBID) 2 % ointment 1 Inch  1 Inch Topical BID    heparin 25,000 units in D5W 250 ml infusion  9-25 Units/kg/hr IntraVENous TITRATE    heparin (porcine) 1,000 unit/mL injection 2,000 Units  2,000 Units IntraVENous PRN    Or    heparin (porcine) 1,000 unit/mL injection 4,000 Units  4,000 Units IntraVENous PRN    sodium chloride (NS) flush 5-40 mL  5-40 mL IntraVENous Q8H    sodium chloride (NS) flush 5-40 mL  5-40 mL IntraVENous PRN    acetaminophen (TYLENOL) tablet 650 mg  650 mg Oral Q6H PRN    Or    acetaminophen (TYLENOL) suppository 650 mg  650 mg Rectal Q6H PRN    polyethylene glycol (MIRALAX) packet 17 g  17 g Oral DAILY PRN    ondansetron (ZOFRAN ODT) tablet 4 mg  4 mg Oral Q8H PRN    Or    ondansetron (ZOFRAN) injection 4 mg  4 mg IntraVENous Q6H PRN    glucose chewable tablet 16 g  4 Tablet Oral PRN    dextrose 10% infusion 0-250 mL  0-250 mL IntraVENous PRN    glucagon (GLUCAGEN) injection 1 mg  1 mg IntraMUSCular PRN    insulin lispro (HUMALOG) injection   SubCUTAneous AC&HS    lisinopriL (PRINIVIL, ZESTRIL) tablet 40 mg  40 mg Oral DAILY    atorvastatin (LIPITOR) tablet 80 mg  80 mg Oral QHS    aspirin delayed-release tablet 81 mg  81 mg Oral DAILY    ticagrelor (BRILINTA) tablet 90 mg  90 mg Oral BID    spironolactone (ALDACTONE) tablet 25 mg  25 mg Oral DAILY    pantoprazole (PROTONIX) tablet 40 mg  40 mg Oral DAILY    cyclobenzaprine (FLEXERIL) tablet 5 mg  5 mg Oral TID PRN    amLODIPine (NORVASC) tablet 10 mg  10 mg Oral DAILY         Lobo Shelbyns III, DO

## 2022-02-25 ENCOUNTER — TRANSCRIBE ORDER (OUTPATIENT)
Dept: CARDIAC REHAB | Age: 62
End: 2022-02-25

## 2022-02-25 DIAGNOSIS — I21.4 NSTEMI (NON-ST ELEVATED MYOCARDIAL INFARCTION) (HCC): Primary | ICD-10-CM

## 2022-02-25 NOTE — PROGRESS NOTES
Physician Progress Note      PATIENT:               Delores Garcia  CSN #:                  [id number]  :                       1960  ADMIT DATE:       2022 4:25 PM  [address] [place] DATE:        2022 7:21 PM  RESPONDING  PROVIDER #:        Beatrice Contreras MD          QUERY TEXT:    Patient admitted with NSTEMI  Documentation reflects Coronary Artery Disease in Discharge summary note(s) dated 22. If possible, please document in the progress notes and discharge summary if NSTEMI was: The medical record reflects the following:  Risk Factors: 66 y/o female to ED with Chest pain, noted history of CAD with cath in 2020 for NSTEMI as well as 2020 NSTEMI, multiple JES in place per CV Consult PN. Clinical Indicators:  H&P: NSTEMI   CV Consult: Chest tightness, UA/ NSTEMI   MD PN: NSTEMI   MD PN: NSTEMI   DC summary: Coronary artery disease; \"She presented with worsening chest heaviness, had cath done today, showed 1 vessel CAD and patent stents. Cardiology recommended medical management and cleared for discharge today\". Labs:  2022 15:18  Troponin-High Sensitivity: 187 (HH)  NT pro-BNP: 355 (H)    2022 17:25  Troponin-High Sensitivity: 201 (HH)     Echo: Final result  Left? Ventricle: Left ventricle size is normal. Mildly increased wall thickness. Findings consistent with mild concentric hypertrophy. Normal left ventricular systolic function with a visually estimated EF of 55 - 60%. Right? Ventricle: Not well visualized. Probably normal size and function. Left? Atrium: Left atrium is mildly dilated  ?    CV Procedure: LHC, CORS; Post-procedure Diagnosis: 1v CAD, patent stents, normal LVEDP    Treatment: admit, hospitalist consult, cardiology consult, Labs, continue Brilinta and Aspirin, Heparin drip, nitro paste, Metorpolol, isosorbide, amlodipine, ECHO, Left Heart Cath  Options provided:  -- NSTEMI confirmed after study  -- NSTEMI ruled out after study  -- Other - I will add my own diagnosis  -- Disagree - Not applicable / Not valid  -- Disagree - Clinically unable to determine / Unknown  -- Refer to Clinical Documentation Reviewer    PROVIDER RESPONSE TEXT:    NSTEMI ruled out after study.     Query created by: Marilynn Avila on 2/25/2022 10:11 AM      Electronically signed by:  Liliana Rodríguez MD 2/25/2022 10:15 AM

## 2022-02-25 NOTE — CARDIO/PULMONARY
Cardiac Rehab Note: chart review    NSTEMI 2/22/22, Cardiac cath without intervention 2/24/22    Smoking history assessed. Patient is a current smoker. Smoking Cessation Program link has been added to the AVS.     EF 55-60%  on 2/24/22 per Echo    MI education folder, with heart heathy diet, warning signs, heart facts, catheterization brochure, and out patient cardiac rehab program mailed to the home of [facility]. Patient was discharged prior to being seen. CP Rehab will attempt to follow up by phone.

## 2022-03-18 PROBLEM — N12 PYELONEPHRITIS: Status: ACTIVE | Noted: 2018-05-20

## 2022-03-18 PROBLEM — R07.9 CHEST PAIN: Status: ACTIVE | Noted: 2020-08-13

## 2022-03-19 PROBLEM — I21.4 NSTEMI (NON-ST ELEVATED MYOCARDIAL INFARCTION) (HCC): Status: ACTIVE | Noted: 2022-02-22

## 2022-03-19 PROBLEM — N60.02 BREAST CYST, LEFT: Status: ACTIVE | Noted: 2019-03-13

## 2022-03-19 PROBLEM — E11.9 TYPE II DIABETES MELLITUS (HCC): Status: ACTIVE | Noted: 2018-05-24

## 2022-03-19 PROBLEM — N39.0 UTI (URINARY TRACT INFECTION): Status: ACTIVE | Noted: 2018-05-24

## 2022-03-19 PROBLEM — R16.0 LIVER MASS: Status: ACTIVE | Noted: 2018-05-24

## 2022-03-19 PROBLEM — E66.01 SEVERE OBESITY (HCC): Status: ACTIVE | Noted: 2019-02-27

## 2022-04-26 ENCOUNTER — TRANSCRIBE ORDER (OUTPATIENT)
Dept: SCHEDULING | Age: 62
End: 2022-04-26

## 2022-04-26 DIAGNOSIS — F17.210 NICOTINE DEPENDENCE, CIGARETTES, UNCOMPLICATED: Primary | ICD-10-CM

## 2022-09-22 ENCOUNTER — HOSPITAL ENCOUNTER (EMERGENCY)
Age: 62
Discharge: HOME | End: 2022-09-22
Attending: STUDENT IN AN ORGANIZED HEALTH CARE EDUCATION/TRAINING PROGRAM
Payer: MEDICAID

## 2022-09-22 VITALS
DIASTOLIC BLOOD PRESSURE: 70 MMHG | HEIGHT: 65 IN | TEMPERATURE: 98.3 F | SYSTOLIC BLOOD PRESSURE: 124 MMHG | RESPIRATION RATE: 18 BRPM | HEART RATE: 70 BPM | WEIGHT: 238.32 LBS | OXYGEN SATURATION: 96 % | BODY MASS INDEX: 39.71 KG/M2

## 2022-09-22 DIAGNOSIS — E87.6 HYPOKALEMIA: Primary | ICD-10-CM

## 2022-09-22 DIAGNOSIS — N30.90 CYSTITIS: ICD-10-CM

## 2022-09-22 LAB
ALBUMIN SERPL-MCNC: 3.4 G/DL (ref 3.5–5)
ALBUMIN/GLOB SERPL: 0.9 {RATIO} (ref 1.1–2.2)
ALP SERPL-CCNC: 58 U/L (ref 45–117)
ALT SERPL-CCNC: 18 U/L (ref 12–78)
ANION GAP SERPL CALC-SCNC: 8 MMOL/L (ref 5–15)
APPEARANCE UR: ABNORMAL
AST SERPL-CCNC: 13 U/L (ref 15–37)
BACTERIA URNS QL MICRO: ABNORMAL /HPF
BASOPHILS # BLD: 0.1 K/UL (ref 0–0.1)
BASOPHILS NFR BLD: 1 % (ref 0–1)
BILIRUB SERPL-MCNC: 1.1 MG/DL (ref 0.2–1)
BILIRUB UR QL: NEGATIVE
BUN SERPL-MCNC: 10 MG/DL (ref 6–20)
BUN/CREAT SERPL: 12 (ref 12–20)
CALCIUM SERPL-MCNC: 7.4 MG/DL (ref 8.5–10.1)
CHLORIDE SERPL-SCNC: 109 MMOL/L (ref 97–108)
CO2 SERPL-SCNC: 23 MMOL/L (ref 21–32)
COLOR UR: ABNORMAL
CREAT SERPL-MCNC: 0.86 MG/DL (ref 0.55–1.02)
DIFFERENTIAL METHOD BLD: ABNORMAL
EOSINOPHIL # BLD: 0.2 K/UL (ref 0–0.4)
EOSINOPHIL NFR BLD: 2 % (ref 0–7)
EPITH CASTS URNS QL MICRO: ABNORMAL /LPF
ERYTHROCYTE [DISTWIDTH] IN BLOOD BY AUTOMATED COUNT: 14.7 % (ref 11.5–14.5)
GLOBULIN SER CALC-MCNC: 4 G/DL (ref 2–4)
GLUCOSE BLD STRIP.AUTO-MCNC: 164 MG/DL (ref 65–117)
GLUCOSE SERPL-MCNC: 141 MG/DL (ref 65–100)
GLUCOSE UR STRIP.AUTO-MCNC: NEGATIVE MG/DL
HCT VFR BLD AUTO: 39.2 % (ref 35–47)
HGB BLD-MCNC: 13.2 G/DL (ref 11.5–16)
HGB UR QL STRIP: NEGATIVE
IMM GRANULOCYTES # BLD AUTO: 0 K/UL (ref 0–0.04)
IMM GRANULOCYTES NFR BLD AUTO: 0 % (ref 0–0.5)
KETONES UR QL STRIP.AUTO: 15 MG/DL
LEUKOCYTE ESTERASE UR QL STRIP.AUTO: ABNORMAL
LYMPHOCYTES # BLD: 2.6 K/UL (ref 0.8–3.5)
LYMPHOCYTES NFR BLD: 27 % (ref 12–49)
MCH RBC QN AUTO: 29.1 PG (ref 26–34)
MCHC RBC AUTO-ENTMCNC: 33.7 G/DL (ref 30–36.5)
MCV RBC AUTO: 86.5 FL (ref 80–99)
MONOCYTES # BLD: 0.6 K/UL (ref 0–1)
MONOCYTES NFR BLD: 6 % (ref 5–13)
NEUTS SEG # BLD: 6.1 K/UL (ref 1.8–8)
NEUTS SEG NFR BLD: 64 % (ref 32–75)
NITRITE UR QL STRIP.AUTO: NEGATIVE
NRBC # BLD: 0 K/UL (ref 0–0.01)
NRBC BLD-RTO: 0 PER 100 WBC
PH UR STRIP: 6 [PH] (ref 5–8)
PLATELET # BLD AUTO: 267 K/UL (ref 150–400)
PMV BLD AUTO: 10.4 FL (ref 8.9–12.9)
POTASSIUM SERPL-SCNC: 2.8 MMOL/L (ref 3.5–5.1)
PROT SERPL-MCNC: 7.4 G/DL (ref 6.4–8.2)
PROT UR STRIP-MCNC: 30 MG/DL
RBC # BLD AUTO: 4.53 M/UL (ref 3.8–5.2)
RBC #/AREA URNS HPF: ABNORMAL /HPF (ref 0–5)
SERVICE CMNT-IMP: ABNORMAL
SODIUM SERPL-SCNC: 140 MMOL/L (ref 136–145)
SP GR UR REFRACTOMETRY: 1.03
UA: UC IF INDICATED,UAUC: ABNORMAL
UROBILINOGEN UR QL STRIP.AUTO: 1 EU/DL (ref 0.2–1)
WBC # BLD AUTO: 9.6 K/UL (ref 3.6–11)
WBC URNS QL MICRO: ABNORMAL /HPF (ref 0–4)

## 2022-09-22 PROCEDURE — 85025 COMPLETE CBC W/AUTO DIFF WBC: CPT

## 2022-09-22 PROCEDURE — 81001 URINALYSIS AUTO W/SCOPE: CPT

## 2022-09-22 PROCEDURE — 74011250636 HC RX REV CODE- 250/636: Performed by: STUDENT IN AN ORGANIZED HEALTH CARE EDUCATION/TRAINING PROGRAM

## 2022-09-22 PROCEDURE — 36415 COLL VENOUS BLD VENIPUNCTURE: CPT

## 2022-09-22 PROCEDURE — 87086 URINE CULTURE/COLONY COUNT: CPT

## 2022-09-22 PROCEDURE — 80053 COMPREHEN METABOLIC PANEL: CPT

## 2022-09-22 PROCEDURE — 74011250637 HC RX REV CODE- 250/637: Performed by: STUDENT IN AN ORGANIZED HEALTH CARE EDUCATION/TRAINING PROGRAM

## 2022-09-22 PROCEDURE — 82962 GLUCOSE BLOOD TEST: CPT

## 2022-09-22 PROCEDURE — 96365 THER/PROPH/DIAG IV INF INIT: CPT

## 2022-09-22 PROCEDURE — 99284 EMERGENCY DEPT VISIT MOD MDM: CPT

## 2022-09-22 RX ORDER — NITROFURANTOIN (MACROCRYSTALS) 100 MG/1
100 CAPSULE ORAL 2 TIMES DAILY
Qty: 6 CAPSULE | Refills: 0 | Status: SHIPPED | OUTPATIENT
Start: 2022-09-22 | End: 2022-09-25

## 2022-09-22 RX ORDER — POTASSIUM CHLORIDE 750 MG/1
40 TABLET, FILM COATED, EXTENDED RELEASE ORAL
Status: COMPLETED | OUTPATIENT
Start: 2022-09-22 | End: 2022-09-22

## 2022-09-22 RX ORDER — POTASSIUM CHLORIDE 7.45 MG/ML
10 INJECTION INTRAVENOUS ONCE
Status: COMPLETED | OUTPATIENT
Start: 2022-09-22 | End: 2022-09-22

## 2022-09-22 RX ADMIN — POTASSIUM CHLORIDE 10 MEQ: 7.46 INJECTION, SOLUTION INTRAVENOUS at 16:49

## 2022-09-22 RX ADMIN — SODIUM CHLORIDE 1000 ML: 9 INJECTION, SOLUTION INTRAVENOUS at 16:48

## 2022-09-22 RX ADMIN — POTASSIUM CHLORIDE 40 MEQ: 750 TABLET, FILM COATED, EXTENDED RELEASE ORAL at 16:48

## 2022-09-22 NOTE — ED NOTES
Pt presents to ED via triage. Pt states that she has had a tingling sensation throughout her body as well as muscle spasms that retract her fingers. Pt states that she was recently taking several steroids and feels that they had a negative effect on her. Pt states she was taken off all the steroids recently.

## 2022-09-22 NOTE — ED PROVIDER NOTES
EMERGENCY DEPARTMENT HISTORY AND PHYSICAL EXAM      Date: 9/22/2022  Patient Name: Yandel Davis    History of Presenting Illness     Chief Complaint   Patient presents with    Spasms     Reports new onset of generalized muscle spasm and tingling sensation starting about an hour ago. Hx of DM type 2, BG in triage 164. Denied chest pain, SOB, and N/V/D.          HPI: Yandel Davis, 65 y.o. female presents to the ED with cc of numbness and tingling of her entire body. She says this extends from all of her extremities to her trunk and her face. She says it feels like her muscles are \"vibrating. \"  She denies any focal weakness, denies any speech deficits, no significant headache. This has been going on for the past 2 days. About 4 weeks ago, she was put on several steroids, she said that she just recently was titrated off of them and had many side effects from the steroids. She denies any recent fevers or cough, no abdominal pain vomiting or diarrhea. States that she has otherwise been compliant with her medications. There are no other complaints, changes, or physical findings at this time. PCP: Anibal Mclean MD    No current facility-administered medications on file prior to encounter. Current Outpatient Medications on File Prior to Encounter   Medication Sig Dispense Refill    metFORMIN (GLUMETZA ER) 500 mg TG24 24 hour tablet Take 1,000 mg by mouth two (2) times a day. glipiZIDE SR (GLUCOTROL XL) 10 mg CR tablet Take 10 mg by mouth daily. ISOSORBIDE MONONITRATE PO Take 30 mg by mouth daily. ticagrelor (BRILINTA) 90 mg tablet Take 90 mg by mouth two (2) times a day. atorvastatin (LIPITOR) 80 mg tablet Take 80 mg by mouth nightly. pantoprazole (PROTONIX) 40 mg tablet Take 40 mg by mouth daily. amLODIPine (NORVASC) 10 mg tablet Take 5 mg by mouth daily. aspirin delayed-release 81 mg tablet Take  by mouth daily.       lisinopriL (PRINIVIL, ZESTRIL) 40 mg tablet Take 40 mg by mouth daily. spironolactone (ALDACTONE) 25 mg tablet Take 25 mg by mouth daily. metoprolol succinate (TOPROL-XL) 100 mg tablet Take 100 mg by mouth daily. Past History     Past Medical History:  Past Medical History:   Diagnosis Date    Calculus of kidney     Difficult intubation     per patient she was given 2 papers in regards to this, asked her to bring DOS. DM (diabetes mellitus) ([facility]     GERD (gastroesophageal reflux disease)     Headache     Hypertension     NSTEMI (non-ST elevated myocardial infarction) ([facility] 5/17/2020    Sleep apnea     no cpap- severe EM per patient       Past Surgical History:  Past Surgical History:   Procedure Laterality Date    HX GYN      ovary removed    HX HEENT Bilateral 2016    BMT's    HX OTHER SURGICAL  2010    KIDNEY STONES REMOVED       Family History:  History reviewed. No pertinent family history. Social History:  Social History     Tobacco Use    Smoking status: Every Day     Packs/day: 0.25     Types: Cigarettes    Smokeless tobacco: Never   Substance Use Topics    Alcohol use: No    Drug use: No       Allergies:  No Known Allergies      Review of Systems   no fever  No ear pain  No eye pain  no shortness of breath  no chest pain  no abdominal pain  no dysuria  no leg pain  No rash  No lymphadenopathy  No weight loss    Physical Exam   Physical Exam  Constitutional:       General: She is not in acute distress. Appearance: She is not toxic-appearing. HENT:      Head: Normocephalic. Eyes:      Extraocular Movements: Extraocular movements intact. Cardiovascular:      Rate and Rhythm: Normal rate and regular rhythm. Pulmonary:      Effort: Pulmonary effort is normal.      Breath sounds: Normal breath sounds. Abdominal:      Palpations: Abdomen is soft. Tenderness: There is no abdominal tenderness. Musculoskeletal:         General: No tenderness or deformity. Cervical back: Neck supple.    Skin:     General: Skin is warm and dry. Neurological:      General: No focal deficit present. Mental Status: She is alert and oriented to person, place, and time. Cranial Nerves: No cranial nerve deficit. Comments: 5/5 strength with bicep flexion and extension bilaterally, 5/5 strength with knee extension bilaterally. Sensation to light touch intact over upper and lower extremities bilaterally. Psychiatric:         Mood and Affect: Mood normal.       Diagnostic Study Results     Labs -     Recent Results (from the past 24 hour(s))   GLUCOSE, POC    Collection Time: 09/22/22  1:07 PM   Result Value Ref Range    Glucose (POC) 164 (H) 65 - 117 mg/dL    Performed by Rylan Stanton RN    URINALYSIS W/ REFLEX CULTURE    Collection Time: 09/22/22  1:37 PM    Specimen: Urine   Result Value Ref Range    Color DARK YELLOW      Appearance CLOUDY (A) CLEAR      Specific gravity 1.026      pH (UA) 6.0 5.0 - 8.0      Protein 30 (A) NEG mg/dL    Glucose Negative NEG mg/dL    Ketone 15 (A) NEG mg/dL    Bilirubin Negative NEG      Blood Negative NEG      Urobilinogen 1.0 0.2 - 1.0 EU/dL    Nitrites Negative NEG      Leukocyte Esterase LARGE (A) NEG      WBC 20-50 0 - 4 /hpf    RBC 0-5 0 - 5 /hpf    Epithelial cells MANY (A) FEW /lpf    Bacteria 4+ (A) NEG /hpf    UA:UC IF INDICATED URINE CULTURE ORDERED (A) CNI     CBC WITH AUTOMATED DIFF    Collection Time: 09/22/22  1:37 PM   Result Value Ref Range    WBC 9.6 3.6 - 11.0 K/uL    RBC 4.53 3.80 - 5.20 M/uL    HGB 13.2 11.5 - 16.0 g/dL    HCT 39.2 35.0 - 47.0 %    MCV 86.5 80.0 - 99.0 FL    MCH 29.1 26.0 - 34.0 PG    MCHC 33.7 30.0 - 36.5 g/dL    RDW 14.7 (H) 11.5 - 14.5 %    PLATELET 218 988 - 764 K/uL    MPV 10.4 8.9 - 12.9 FL    NRBC 0.0 0  WBC    ABSOLUTE NRBC 0.00 0.00 - 0.01 K/uL    NEUTROPHILS 64 32 - 75 %    LYMPHOCYTES 27 12 - 49 %    MONOCYTES 6 5 - 13 %    EOSINOPHILS 2 0 - 7 %    BASOPHILS 1 0 - 1 %    IMMATURE GRANULOCYTES 0 0.0 - 0.5 %    ABS.  NEUTROPHILS 6.1 1.8 - 8.0 K/UL    ABS. LYMPHOCYTES 2.6 0.8 - 3.5 K/UL    ABS. MONOCYTES 0.6 0.0 - 1.0 K/UL    ABS. EOSINOPHILS 0.2 0.0 - 0.4 K/UL    ABS. BASOPHILS 0.1 0.0 - 0.1 K/UL    ABS. IMM. GRANS. 0.0 0.00 - 0.04 K/UL    DF AUTOMATED     METABOLIC PANEL, COMPREHENSIVE    Collection Time: 09/22/22  1:37 PM   Result Value Ref Range    Sodium 140 136 - 145 mmol/L    Potassium 2.8 (L) 3.5 - 5.1 mmol/L    Chloride 109 (H) 97 - 108 mmol/L    CO2 23 21 - 32 mmol/L    Anion gap 8 5 - 15 mmol/L    Glucose 141 (H) 65 - 100 mg/dL    BUN 10 6 - 20 MG/DL    Creatinine 0.86 0.55 - 1.02 MG/DL    BUN/Creatinine ratio 12 12 - 20      GFR est AA >60 >60 ml/min/1.73m2    GFR est non-AA >60 >60 ml/min/1.73m2    Calcium 7.4 (L) 8.5 - 10.1 MG/DL    Bilirubin, total 1.1 (H) 0.2 - 1.0 MG/DL    ALT (SGPT) 18 12 - 78 U/L    AST (SGOT) 13 (L) 15 - 37 U/L    Alk. phosphatase 58 45 - 117 U/L    Protein, total 7.4 6.4 - 8.2 g/dL    Albumin 3.4 (L) 3.5 - 5.0 g/dL    Globulin 4.0 2.0 - 4.0 g/dL    A-G Ratio 0.9 (L) 1.1 - 2.2         Radiologic Studies -   No orders to display     CT Results  (Last 48 hours)      None          CXR Results  (Last 48 hours)      None              Medical Decision Making   I am the first provider for this patient. I reviewed the vital signs, available nursing notes, past medical history, past surgical history, family history and social history. Vital Signs-Reviewed the patient's vital signs. Patient Vitals for the past 24 hrs:   Temp Pulse Resp BP SpO2   09/22/22 1705 -- -- 18 124/70 96 %   09/22/22 1309 98.3 °F (36.8 °C) 70 18 (!) 140/77 99 %         Provider Notes (Medical Decision Making):   79-year-old female presenting with diffuse body numbness and vibrating sensation. Differential includes electrolyte or metabolic abnormalities, paresthesias, peripheral neuropathy, medication side effect. Her neurologic exam here is unremarkable, no focal findings, not consistent with acute intracranial emergency.     ED Course: Initial assessment performed. The patients presenting problems have been discussed, and they are in agreement with the care plan formulated and outlined with them. I have encouraged them to ask questions as they arise throughout their visit. CBC negative for leukocytosis or anemia, UA is suggestive of cystitis. Basic metabolic panel with normal renal function, potassium is low at 2.8. Given her symptomatic hypokalemia, this will be replaced IV and orally. Per chart review, she does not take spironolactone, lisinopril, medications may be contributing to her symptoms. She is resting comfortably on reevaluation. Counseled her on the potassium in her diet, and close follow-up with her primary care doctor. Patient is counseled on supportive care and return precautions. Will return to the ED for any worsening weakness, numbness, spasms, or any new or worrisome symptoms. Will followup with primary care doctor within 4 days. Critical Care Time:         Disposition:  Home    PLAN:  1. Discharge Medication List as of 9/22/2022  5:20 PM        START taking these medications    Details   nitrofurantoin (MACRODANTIN) 100 mg capsule Take 1 Capsule by mouth two (2) times a day for 3 days. , Normal, Disp-6 Capsule, R-0           CONTINUE these medications which have NOT CHANGED    Details   metFORMIN (GLUMETZA ER) 500 mg TG24 24 hour tablet Take 1,000 mg by mouth two (2) times a day., Historical Med      glipiZIDE SR (GLUCOTROL XL) 10 mg CR tablet Take 10 mg by mouth daily. , Historical Med      ISOSORBIDE MONONITRATE PO Take 30 mg by mouth daily. , Historical Med      ticagrelor (BRILINTA) 90 mg tablet Take 90 mg by mouth two (2) times a day., Historical Med      atorvastatin (LIPITOR) 80 mg tablet Take 80 mg by mouth nightly., Historical Med      pantoprazole (PROTONIX) 40 mg tablet Take 40 mg by mouth daily. , Historical Med      amLODIPine (NORVASC) 10 mg tablet Take 5 mg by mouth daily. , Historical Med aspirin delayed-release 81 mg tablet Take  by mouth daily. , Historical Med      lisinopriL (PRINIVIL, ZESTRIL) 40 mg tablet Take 40 mg by mouth daily. , Historical Med      spironolactone (ALDACTONE) 25 mg tablet Take 25 mg by mouth daily. , Historical Med      metoprolol succinate (TOPROL-XL) 100 mg tablet Take 100 mg by mouth daily. , Historical Med           2.    Follow-up Information       Follow up With Specialties Details Why Contact Info    Phil Tobias MD Family Medicine Schedule an appointment as soon as possible for a visit in 4 days  [address]  [address] [address] [phone]      [facility] [address] [facility] DEPT Emergency Medicine  As needed, If symptoms worsen [address] [place] [address] Drive  [address] [facility]  [phone]          Return to ED if worse     Diagnosis     Clinical Impression: Acute diffuse body numbness and hypokalemia, acute cystitis

## 2022-09-23 LAB
BACTERIA SPEC CULT: NORMAL
SERVICE CMNT-IMP: NORMAL

## 2023-01-04 ENCOUNTER — TRANSCRIBE ORDER (OUTPATIENT)
Dept: SCHEDULING | Age: 63
End: 2023-01-04

## 2023-01-04 DIAGNOSIS — R91.1 PULMONARY NODULE: Primary | ICD-10-CM

## 2023-01-04 DIAGNOSIS — Z12.39 SCREENING FOR BREAST CANCER: ICD-10-CM

## 2023-04-22 DIAGNOSIS — R91.1 PULMONARY NODULE: Primary | ICD-10-CM

## 2023-04-23 DIAGNOSIS — R91.1 PULMONARY NODULE: Primary | ICD-10-CM

## (undated) DEVICE — MEDI-TRACE CADENCE ADULT, DEFIBRILLATION ELECTRODE -RTS  (10 PR/PK) - PHILIPS: Brand: MEDI-TRACE CADENCE

## (undated) DEVICE — SHEET, T, LAPAROTOMY, STERILE: Brand: MEDLINE

## (undated) DEVICE — Device: Brand: CORSAIR PRO

## (undated) DEVICE — KENDALL SCD EXPRESS SLEEVES, KNEE LENGTH, MEDIUM: Brand: KENDALL SCD

## (undated) DEVICE — MEDI-VAC NON-CONDUCTIVE SUCTION TUBING: Brand: CARDINAL HEALTH

## (undated) DEVICE — PINNACLE INTRODUCER SHEATH: Brand: PINNACLE

## (undated) DEVICE — Device: Brand: FIELDER XT

## (undated) DEVICE — ANGIO-SEAL VIP VASCULAR CLOSURE DEVICE: Brand: ANGIO-SEAL

## (undated) DEVICE — ROSEN CURVED WIRE GUIDE: Brand: ROSEN

## (undated) DEVICE — DECANTER FLD L85IN IV FLX TBNG CLMP DLP

## (undated) DEVICE — MINI TREK CORONARY DILATATION CATHETER 1.20 MM X 20 MM / RAPID-EXCHANGE: Brand: MINI TREK

## (undated) DEVICE — GUIDE 5FR  JR4.0 100CM

## (undated) DEVICE — GUIDEWIRE VASC L145CM 0.035IN J TIP L3MM PTFE FIX COR NAMIC

## (undated) DEVICE — CATHETER BLLN 142CM SPRINTER LEGEND RX 1.25MMX15MM

## (undated) DEVICE — RUNTHROUGH NS EXTRA FLOPPY PTCA GUIDEWIRE: Brand: RUNTHROUGH

## (undated) DEVICE — SUTURE PERMAHAND SZ 2-0 L18IN NONABSORBABLE BLK L26MM PS 1588H

## (undated) DEVICE — 3M™ TEGADERM™ TRANSPARENT FILM DRESSING FRAME STYLE, 1626W, 4 IN X 4-3/4 IN (10 CM X 12 CM), 50/CT 4CT/CASE: Brand: 3M™ TEGADERM™

## (undated) DEVICE — (D)PREP SKN CHLRAPRP APPL 26ML -- CONVERT TO ITEM 371833

## (undated) DEVICE — MINI TREK CORONARY DILATATION CATHETER 2.0 MM X 15 MM / RAPID-EXCHANGE: Brand: MINI TREK

## (undated) DEVICE — DEVON™ KNEE AND BODY STRAP 60" X 3" (1.5 M X 7.6 CM): Brand: DEVON

## (undated) DEVICE — MEDI-TRACE CADENCE ADULT, DEFIBRILLATION ELECTRODE -RTS  (10 PR/PK) - PHYSIO-CONTROL: Brand: MEDI-TRACE CADENCE

## (undated) DEVICE — HI-TORQUE PILOT 200 GUIDE WIRE .014 STRAIGHT TIP 3.0 CM X 190 CM: Brand: HI-TORQUE PILOT

## (undated) DEVICE — DRESSING HEMOSTATIC SFT INTVENT W/O SLT DBL WRP QUIKCLOT LF

## (undated) DEVICE — PACK PROCEDURE SURG HRT CATH

## (undated) DEVICE — SYRINGE KIT,PACKAGED,,150FT,MK 7(ANGIO-ARTERION, 150ML SYR KIT W/QFT,MC)(60729385): Brand: MEDRAD® MARK 7 ARTERION DISPOSABLE SYRINGE 150 ML WITH QUICK FILL TUBE

## (undated) DEVICE — CUSTOM KT PTCA INFL DEV K05 00053H

## (undated) DEVICE — NEEDLE HYPO 22GA L1.5IN BLK S STL HUB POLYPR SHLD REG BVL

## (undated) DEVICE — CATHETER ETER CARD MULTIPAK MULTIPAK 5FR PERFORMA

## (undated) DEVICE — RADIFOCUS OPTITORQUE ANGIOGRAPHIC CATHETER: Brand: OPTITORQUE

## (undated) DEVICE — STERILE POLYISOPRENE POWDER-FREE SURGICAL GLOVES WITH EMOLLIENT COATING: Brand: PROTEXIS

## (undated) DEVICE — TOWEL SURG W17XL27IN STD BLU COT NONFENESTRATED PREWASHED

## (undated) DEVICE — Device

## (undated) DEVICE — MINI TREK CORONARY DILATATION CATHETER 1.20 MM X 15 MM / RAPID-EXCHANGE: Brand: MINI TREK

## (undated) DEVICE — AIRLIFE™  ADULT CUSHION NASAL CANNULA WITH 7 FOOT (2.1 M) CRUSH-RESISTANT OXYGEN TUBING, AND U/CONNECT-IT ADAPTER: Brand: AIRLIFE™

## (undated) DEVICE — STERILE POLYISOPRENE POWDER-FREE SURGICAL GLOVES: Brand: PROTEXIS

## (undated) DEVICE — DEVICE COMPR REG 24 CM VASC BND

## (undated) DEVICE — TIP SUCT BLU PLAS BLB W/O CTRL VENT YANK

## (undated) DEVICE — APPLICATOR BNDG 1MM ADH PREMIERPRO EXOFIN

## (undated) DEVICE — SMOKE EVACUATION PENCIL: Brand: VALLEYLAB

## (undated) DEVICE — GLIDESHEATH SLENDER ACCESS KIT: Brand: GLIDESHEATH SLENDER

## (undated) DEVICE — ANGIOGRAPHY KIT CUST [K0910930B] [MERIT MEDICAL SYSTEMS INC]

## (undated) DEVICE — SYR POWER 150ML 8IN FILL TUBE --

## (undated) DEVICE — CATH TRAPPER EXCHANGE --

## (undated) DEVICE — REM POLYHESIVE ADULT PATIENT RETURN ELECTRODE: Brand: VALLEYLAB

## (undated) DEVICE — ADULT SPO2 SENSOR: Brand: NELLCOR

## (undated) DEVICE — SUTURE VCRL SZ 4-0 L27IN ABSRB UD L19MM PS-2 3/8 CIR PRIM J426H

## (undated) DEVICE — DRAPE PRB US TRNSDCR 6X96IN --

## (undated) DEVICE — HANDLE LT SNAP ON ULT DURABLE LENS FOR TRUMPF ALC DISPOSABLE

## (undated) DEVICE — INFECTION CONTROL KIT SYS

## (undated) DEVICE — TUBING PRSS MON L6IN PVC M FEM CONN

## (undated) DEVICE — SUTURE VCRL SZ 3-0 L27IN ABSRB UD L26MM SH 1/2 CIR J416H

## (undated) DEVICE — CATH GUID COR AL75S 6FR 100CM -- LAUNCHER

## (undated) DEVICE — CATH GUID COR EB35 6FR 100CM -- LAUNCHER

## (undated) DEVICE — PRESSURE MONITORING SET: Brand: TRUWAVE

## (undated) DEVICE — SOLUTION IV 1000ML 0.9% SOD CHL

## (undated) DEVICE — MINI TREK CORONARY DILATATION CATHETER 1.50 MM X 15 MM / RAPID-EXCHANGE: Brand: MINI TREK

## (undated) DEVICE — GOWN,PREVENTION PLUS,XL,ST,24/CS: Brand: MEDLINE

## (undated) DEVICE — SYR 10ML LUER LOK 1/5ML GRAD --

## (undated) DEVICE — MINI TREK CORONARY DILATATION CATHETER 2.0 MM X 12 MM / RAPID-EXCHANGE: Brand: MINI TREK

## (undated) DEVICE — CATH GUID .056IN 6FR 150CM -- GUIDELINER V3

## (undated) DEVICE — SYR ART 700 CLEAR MARK 7 -- ARTERION

## (undated) DEVICE — SYRINGE ANGIO 10 CC BRL STD PRNT POLYCARB LT BLU MEDALLION

## (undated) DEVICE — MINI TREK CORONARY DILATATION CATHETER 2.0 MM X 30 MM / RAPID-EXCHANGE: Brand: MINI TREK

## (undated) DEVICE — SPLINT WR POS F/ARTERIAL ACC -- BX/10

## (undated) DEVICE — CATH GUID COR EB35 5FR 100CM -- LAUNCHER

## (undated) DEVICE — RADIFOCUS GLIDEWIRE: Brand: GLIDEWIRE